# Patient Record
Sex: FEMALE | Race: WHITE | NOT HISPANIC OR LATINO | Employment: OTHER | URBAN - METROPOLITAN AREA
[De-identification: names, ages, dates, MRNs, and addresses within clinical notes are randomized per-mention and may not be internally consistent; named-entity substitution may affect disease eponyms.]

---

## 2024-05-17 ENCOUNTER — OFFICE VISIT (OUTPATIENT)
Dept: OBSTETRICS AND GYNECOLOGY | Facility: CLINIC | Age: 44
End: 2024-05-17
Payer: COMMERCIAL

## 2024-05-17 ENCOUNTER — PREP FOR PROCEDURE (OUTPATIENT)
Dept: OBSTETRICS AND GYNECOLOGY | Facility: CLINIC | Age: 44
End: 2024-05-17

## 2024-05-17 VITALS
BODY MASS INDEX: 45.27 KG/M2 | DIASTOLIC BLOOD PRESSURE: 85 MMHG | SYSTOLIC BLOOD PRESSURE: 104 MMHG | WEIGHT: 246 LBS | HEIGHT: 62 IN

## 2024-05-17 DIAGNOSIS — D69.3 IDIOPATHIC THROMBOCYTOPENIC PURPURA (MULTI): ICD-10-CM

## 2024-05-17 DIAGNOSIS — N92.0 MENORRHAGIA WITH REGULAR CYCLE: Primary | ICD-10-CM

## 2024-05-17 DIAGNOSIS — Z86.711 HISTORY OF PULMONARY EMBOLISM: ICD-10-CM

## 2024-05-17 DIAGNOSIS — N80.9 ENDOMETRIOSIS: ICD-10-CM

## 2024-05-17 DIAGNOSIS — M06.09 RHEUMATOID ARTHRITIS OF MULTIPLE SITES WITH NEGATIVE RHEUMATOID FACTOR (MULTI): ICD-10-CM

## 2024-05-17 DIAGNOSIS — N93.9 ABNORMAL UTERINE BLEEDING: Primary | ICD-10-CM

## 2024-05-17 DIAGNOSIS — Z01.818 PREOPERATIVE EXAM FOR GYNECOLOGIC SURGERY: ICD-10-CM

## 2024-05-17 DIAGNOSIS — E24.0 CUSHING DISEASE (MULTI): ICD-10-CM

## 2024-05-17 DIAGNOSIS — I27.20 PULMONARY HYPERTENSION (MULTI): ICD-10-CM

## 2024-05-17 PROCEDURE — 1036F TOBACCO NON-USER: CPT | Performed by: OBSTETRICS & GYNECOLOGY

## 2024-05-17 PROCEDURE — 99204 OFFICE O/P NEW MOD 45 MIN: CPT | Performed by: OBSTETRICS & GYNECOLOGY

## 2024-05-17 PROCEDURE — 99214 OFFICE O/P EST MOD 30 MIN: CPT | Performed by: OBSTETRICS & GYNECOLOGY

## 2024-05-17 RX ORDER — SULFASALAZINE 500 MG/1
1000 TABLET ORAL 3 TIMES DAILY
COMMUNITY
Start: 2024-02-07 | End: 2024-07-07

## 2024-05-17 ASSESSMENT — ENCOUNTER SYMPTOMS
GASTROINTESTINAL NEGATIVE: 0
ENDOCRINE NEGATIVE: 0
SHORTNESS OF BREATH: 1
HEMATOLOGIC/LYMPHATIC NEGATIVE: 0
RESPIRATORY NEGATIVE: 0
CONSTITUTIONAL NEGATIVE: 0
PSYCHIATRIC NEGATIVE: 0
CARDIOVASCULAR NEGATIVE: 0
MUSCULOSKELETAL NEGATIVE: 0
NEUROLOGICAL NEGATIVE: 0
BACK PAIN: 1
ARTHRALGIAS: 1
FATIGUE: 1
EYES NEGATIVE: 0
ALLERGIC/IMMUNOLOGIC NEGATIVE: 0

## 2024-05-17 ASSESSMENT — PATIENT HEALTH QUESTIONNAIRE - PHQ9
10. IF YOU CHECKED OFF ANY PROBLEMS, HOW DIFFICULT HAVE THESE PROBLEMS MADE IT FOR YOU TO DO YOUR WORK, TAKE CARE OF THINGS AT HOME, OR GET ALONG WITH OTHER PEOPLE: SOMEWHAT DIFFICULT
2. FEELING DOWN, DEPRESSED OR HOPELESS: SEVERAL DAYS
SUM OF ALL RESPONSES TO PHQ9 QUESTIONS 1 AND 2: 2
1. LITTLE INTEREST OR PLEASURE IN DOING THINGS: SEVERAL DAYS

## 2024-05-17 ASSESSMENT — PAIN SCALES - GENERAL: PAINLEVEL: 0-NO PAIN

## 2024-05-17 NOTE — PROGRESS NOTES
Subjective   Patient ID: Emilee Dempsey is a 43 y.o. female who presents for Menorrhagia (Last pap 11/12/18 wnl. HPV -.).  Patient here for evaluation.  Chart reviewed from [facility].  Internal medicine, cardiology, rheumatology, pulmonology notes all reviewed along with recent admissions patient with prior history which is very extensive.  Pertinent GYN history would be positive endometriosis.  Patient currently notes that she has heavy menses which initially were over 28 to 30 days lasting 6 to 8 days.  Patient notes that since March 28 has been bleeding off and on continuously occasionally heavy occasionally spotting.  Having moderate dysmenorrhea 4-8 out of 10 sharp crampy suprapubic variable not necessarily related to the amount of bleeding.  Seems variable no other gross inciting factors.  No radiation.    Review of Systems   Constitutional:  Positive for fatigue.   Respiratory:  Positive for shortness of breath.    Cardiovascular:  Positive for chest pain.   Genitourinary:  Positive for vaginal bleeding.   Musculoskeletal:  Positive for arthralgias and back pain.   All other systems reviewed and are negative.      Objective   Physical Exam  Constitutional:       Appearance: Normal appearance. She is obese.   Cardiovascular:      Rate and Rhythm: Normal rate and regular rhythm.   Pulmonary:      Effort: Pulmonary effort is normal.      Breath sounds: Normal breath sounds.   Abdominal:      General: Abdomen is flat. Bowel sounds are normal. There is no distension.      Palpations: Abdomen is soft. There is no mass.      Tenderness: There is no abdominal tenderness. There is no guarding.      Hernia: There is no hernia in the right inguinal area.   Genitourinary:     General: Normal vulva.      Pubic Area: No rash.       Labia:         Right: No rash, tenderness, lesion or injury.       Urethra: No prolapse, urethral pain, urethral swelling or urethral lesion.      Vagina: Normal.      Cervix: Normal.       Uterus: Normal.       Adnexa: Right adnexa normal and left adnexa normal.      Rectum: Normal.      Comments: External genitalia unremarkable  Vagina clear no blood noted.  Narrow vagina  Cervix closed uterus normal.  Difficult exam due to the patient's size  No adnexal tenderness or masses noted  External genitalia unremarkable    Musculoskeletal:      Cervical back: Normal range of motion and neck supple.   Lymphadenopathy:      Lower Body: No right inguinal adenopathy.   Neurological:      General: No focal deficit present.      Mental Status: She is alert.   Psychiatric:         Mood and Affect: Mood normal.         Behavior: Behavior normal.         Thought Content: Thought content normal.         Judgment: Judgment normal.     Assessment/Plan   Diagnoses and all orders for this visit:  Menorrhagia with regular cycle  -     US PELVIS TRANSABDOMINAL WITH TRANSVAGINAL; Future  Idiopathic thrombocytopenic purpura (Multi)  Cushing disease (Multi)  Rheumatoid arthritis of multiple sites with negative rheumatoid factor (Multi)  Pulmonary hypertension (Multi)  Endometriosis  Extensive review of chart 20 minutes prior to visit.  Another 20 minutes while patient was here.  Discussed situation with patient and mother.  Patient with multiple medical issues.  Has history of irregular bleeding since March 28 with a history of menorrhagia prior to that.  Discussed options for treatment.  Patient very uncomfortable during speculum exam, patient does not believe she can tolerate an endometrial biopsy in the office.  Discussed doing diagnostic hysteroscopy D&C in the hospital with placement of Mirena IUD to control the bleeding.  Risks and benefits reviewed.  Ultrasound ordered for evaluation of pelvic structures as exam in office is an adequate.  6-minute total exam with 20 minutes of exam 20 minutes of consultation and 20 minutes reviewing of chart         Magen Flower MD 05/17/24 2:26 PM

## 2024-05-22 ENCOUNTER — DOCUMENTATION (OUTPATIENT)
Dept: PREADMISSION TESTING | Facility: HOSPITAL | Age: 44
End: 2024-05-22
Payer: COMMERCIAL

## 2024-05-22 ENCOUNTER — TELEPHONE (OUTPATIENT)
Dept: OBSTETRICS AND GYNECOLOGY | Facility: CLINIC | Age: 44
End: 2024-05-22
Payer: COMMERCIAL

## 2024-05-22 PROBLEM — N93.9 ABNORMAL UTERINE BLEEDING: Status: ACTIVE | Noted: 2024-05-17

## 2024-05-24 ENCOUNTER — HOSPITAL ENCOUNTER (OUTPATIENT)
Dept: RADIOLOGY | Facility: HOSPITAL | Age: 44
Discharge: HOME | End: 2024-05-24
Payer: COMMERCIAL

## 2024-05-24 DIAGNOSIS — N92.0 MENORRHAGIA WITH REGULAR CYCLE: ICD-10-CM

## 2024-05-24 PROCEDURE — 76856 US EXAM PELVIC COMPLETE: CPT

## 2024-06-06 ENCOUNTER — TELEPHONE (OUTPATIENT)
Dept: OBSTETRICS AND GYNECOLOGY | Facility: CLINIC | Age: 44
End: 2024-06-06
Payer: COMMERCIAL

## 2024-06-06 NOTE — TELEPHONE ENCOUNTER
Called [facility] Cardiologist to ask if she could be seen earlier than 8/7/2 . We had her surgery scheduled for 6/26/24. Ava at  office told me no sooner appointments are available but they will put her on a wait list .  We will probably have to reschedule her procedure . Waiting on  return from [facility] to discuss this case.

## 2024-06-07 ENCOUNTER — CLINICAL SUPPORT (OUTPATIENT)
Dept: PREADMISSION TESTING | Facility: HOSPITAL | Age: 44
End: 2024-06-07
Payer: COMMERCIAL

## 2024-06-07 RX ORDER — OXYCODONE HYDROCHLORIDE 5 MG/1
5 CAPSULE ORAL EVERY 6 HOURS PRN
COMMUNITY

## 2024-06-07 RX ORDER — KETOTIFEN FUMARATE 0.35 MG/ML
1 SOLUTION/ DROPS OPHTHALMIC 2 TIMES DAILY
COMMUNITY

## 2024-06-14 ENCOUNTER — PRE-ADMISSION TESTING (OUTPATIENT)
Dept: PREADMISSION TESTING | Facility: HOSPITAL | Age: 44
End: 2024-06-14
Payer: COMMERCIAL

## 2024-06-14 ENCOUNTER — DOCUMENTATION (OUTPATIENT)
Dept: PREADMISSION TESTING | Facility: HOSPITAL | Age: 44
End: 2024-06-14
Payer: COMMERCIAL

## 2024-06-14 VITALS
RESPIRATION RATE: 20 BRPM | HEART RATE: 75 BPM | TEMPERATURE: 99.3 F | WEIGHT: 241.4 LBS | SYSTOLIC BLOOD PRESSURE: 128 MMHG | DIASTOLIC BLOOD PRESSURE: 85 MMHG | OXYGEN SATURATION: 97 % | BODY MASS INDEX: 44.42 KG/M2 | HEIGHT: 62 IN

## 2024-06-14 DIAGNOSIS — Z01.818 PRE-OP TESTING: Primary | ICD-10-CM

## 2024-06-14 LAB
ATRIAL RATE: 66 BPM
P AXIS: 64 DEGREES
P OFFSET: 197 MS
P ONSET: 135 MS
PR INTERVAL: 172 MS
Q ONSET: 221 MS
QRS COUNT: 11 BEATS
QRS DURATION: 94 MS
QT INTERVAL: 428 MS
QTC CALCULATION(BAZETT): 448 MS
QTC FREDERICIA: 442 MS
R AXIS: 92 DEGREES
T AXIS: 69 DEGREES
T OFFSET: 435 MS
VENTRICULAR RATE: 66 BPM

## 2024-06-14 PROCEDURE — 93010 ELECTROCARDIOGRAM REPORT: CPT | Performed by: INTERNAL MEDICINE

## 2024-06-14 PROCEDURE — 93005 ELECTROCARDIOGRAM TRACING: CPT | Performed by: NURSE PRACTITIONER

## 2024-06-14 RX ORDER — METOLAZONE 5 MG/1
5 TABLET ORAL 3 TIMES WEEKLY
COMMUNITY

## 2024-06-14 ASSESSMENT — ENCOUNTER SYMPTOMS
ARTHRALGIAS: 1
NEUROLOGICAL NEGATIVE: 1
NECK STIFFNESS: 1
MYALGIAS: 1
CONSTITUTIONAL NEGATIVE: 1
CONSTIPATION: 0
BRUISES/BLEEDS EASILY: 1
DIARRHEA: 1
PALPITATIONS: 0
ABDOMINAL PAIN: 0

## 2024-06-14 NOTE — PREPROCEDURE INSTRUCTIONS
Medication List            Accurate as of June 14, 2024  2:12 PM. Always use your most recent med list.                acetaminophen 500 mg tablet  Commonly known as: Tylenol  Notes to patient: May take morning of surgery if needed.     albuterol 90 mcg/actuation inhaler  Notes to patient: May use the morning of surgery.     aspirin 81 mg chewable tablet  Medication Adjustments for Surgery: Take morning of surgery with sip of water, no other fluids     biotin 2,500 mcg capsule  Medication Adjustments for Surgery: Stop 7 days before surgery     bumetanide 2 mg tablet  Commonly known as: Bumex  Medication Adjustments for Surgery: Take morning of surgery with sip of water, no other fluids     * buPROPion  mg 24 hr tablet  Commonly known as: Wellbutrin XL  Medication Adjustments for Surgery: Take morning of surgery with sip of water, no other fluids     * buPROPion  mg 24 hr tablet  Commonly known as: Wellbutrin XL  Medication Adjustments for Surgery: Take morning of surgery with sip of water, no other fluids     calcium carbonate 200 mg calcium chewable tablet  Commonly known as: Tums  Medication Adjustments for Surgery: Continue until night before surgery     carboxymethylcellulose 1 % ophthalmic solution dropperette  Commonly known as: Refresh Celluvisc  Notes to patient: May use day of surgery.     coenzyme Q-10 200 mg capsule  Medication Adjustments for Surgery: Stop 7 days before surgery     cyproheptadine 4 mg tablet  Commonly known as: Periactin  Medication Adjustments for Surgery: Stop 1 day before surgery     desvenlafaxine 100 mg 24 hr tablet  Commonly known as: Pristiq  Medication Adjustments for Surgery: Take morning of surgery with sip of water, no other fluids     diclofenac sodium 1 % gel  Commonly known as: Voltaren  Medication Adjustments for Surgery: Continue until night before surgery     diphenhydrAMINE 50 mg capsule  Commonly known as: BENADryl  Medication Adjustments for Surgery:  Continue until night before surgery     eptinezumab injection  Commonly known as: Vyepti  Notes to patient: Hold on day of surgery.     ergocalciferol 1.25 MG (90473 UT) capsule  Commonly known as: Vitamin D-2  Medication Adjustments for Surgery: Stop 1 day before surgery     fluocinonide 0.05 % external solution  Commonly known as: Lidex  Medication Adjustments for Surgery: Continue until night before surgery     fluticasone 50 mcg/actuation nasal spray  Commonly known as: Flonase  Notes to patient: May use on day of surgery.      folic acid 1 mg tablet  Commonly known as: Folvite  Medication Adjustments for Surgery: Stop 1 day before surgery     gabapentin 400 mg capsule  Commonly known as: Neurontin  Medication Adjustments for Surgery: Take morning of surgery with sip of water, no other fluids     hydroxychloroquine 200 mg tablet  Commonly known as: Plaquenil  Medication Adjustments for Surgery: Take morning of surgery with sip of water, no other fluids     icosapent ethyL 1 gram capsule  Commonly known as: Vascepa  Medication Adjustments for Surgery: Continue until night before surgery     ketoconazole 2 % shampoo  Commonly known as: NIZOral  Medication Adjustments for Surgery: Continue until night before surgery     ketotifen 0.025 % (0.035 %) ophthalmic solution  Commonly known as: Zaditor  Notes to patient: May use on day of surgery.      levalbuterol 1.25 mg/3 mL nebulizer solution  Commonly known as: Xopenex  Notes to patient: May use on day of surgery.     lidocaine 5 % ointment  Commonly known as: Xylocaine  Medication Adjustments for Surgery: Continue until night before surgery     loperamide 2 mg capsule  Commonly known as: Imodium  Medication Adjustments for Surgery: Stop 1 day before surgery     loratadine 10 mg tablet  Commonly known as: Claritin  Medication Adjustments for Surgery: Take morning of surgery with sip of water, no other fluids     LORazepam 2 mg tablet  Commonly known as: Ativan  Notes  to patient: May take on day of surgery if needed.     magnesium oxide 400 mg (241.3 mg magnesium) tablet  Commonly known as: Mag-Ox  Medication Adjustments for Surgery: Stop 1 day before surgery     metOLazone 5 mg tablet  Commonly known as: Zaroxolyn  Medication Adjustments for Surgery: Take morning of surgery with sip of water, no other fluids     milnacipran 100 mg tablet  Commonly known as: SavElla  Medication Adjustments for Surgery: Take morning of surgery with sip of water, no other fluids     moisturizing mouth solution  Commonly known as: Biotene Oral Dry Mouth  Medication Adjustments for Surgery: Continue until night before surgery     mometasone-formoterol 200-5 mcg/actuation inhaler  Commonly known as: Dulera 200  Notes to patient: May use the morning of surgery.     montelukast 10 mg tablet  Commonly known as: Singulair  Medication Adjustments for Surgery: Take morning of surgery with sip of water, no other fluids     multivitamin with minerals tablet  Medication Adjustments for Surgery: Stop 7 days before surgery     NEURIVA PLUS BRAIN PERFORMANCE ORAL  Medication Adjustments for Surgery: Stop 7 days before surgery     ondansetron 8 mg tablet  Commonly known as: Zofran  Notes to patient: May take the morning of surgery if needed.      Opsumit 10 mg tablet tablet  Generic drug: macitentan  Medication Adjustments for Surgery: Take morning of surgery with sip of water, no other fluids     Orenitram 0.125 mg ER tablet  Generic drug: treprostinil diolamine  Medication Adjustments for Surgery: Take morning of surgery with sip of water, no other fluids     oxyCODONE 5 mg immediate release capsule  Commonly known as: Oxy-IR  Notes to patient: May take on the day of surgery if needed.     pantoprazole 20 mg EC tablet  Commonly known as: ProtoNix  Medication Adjustments for Surgery: Take morning of surgery with sip of water, no other fluids     potassium chloride 20 mEq packet  Commonly known as:  Klor-Con  Medication Adjustments for Surgery: Continue until night before surgery     predniSONE 5 mg tablet  Commonly known as: Deltasone  Medication Adjustments for Surgery: Take morning of surgery with sip of water, no other fluids     Refresh Optive Advanced 0.5-1-0.5 % drops  Generic drug: [id number]  Notes to patient: May use the morning of surgery.     Rexulti 3 mg tablet  Generic drug: brexpiprazole  Medication Adjustments for Surgery: Take morning of surgery with sip of water, no other fluids     rOPINIRole 1 mg tablet  Commonly known as: Requip  Medication Adjustments for Surgery: Take morning of surgery with sip of water, no other fluids     Saphnelo  Generic drug: anifrolumab-fnia  Notes to patient: Hold day of surgery     sildenafil 20 mg tablet  Commonly known as: Revatio  Medication Adjustments for Surgery: Take morning of surgery with sip of water, no other fluids     sotatercept-csrk 45 mg kit  Medication Adjustments for Surgery: Take morning of surgery with sip of water, no other fluids     spironolactone 100 mg tablet  Commonly known as: Aldactone  Medication Adjustments for Surgery: Take morning of surgery with sip of water, no other fluids     sulfaSALAzine 500 mg tablet  Commonly known as: Azulfidine  Medication Adjustments for Surgery: Take morning of surgery with sip of water, no other fluids     tiZANidine 4 mg tablet  Commonly known as: Zanaflex  Medication Adjustments for Surgery: Take morning of surgery with sip of water, no other fluids     Ubrelvy 100 mg tablet tablet  Generic drug: ubrogepant  Notes to patient: May take morning of surgery if needed     VITAMIN B COMPLEX ORAL  Medication Adjustments for Surgery: Stop 7 days before surgery           * This list has 2 medication(s) that are the same as other medications prescribed for you. Read the directions carefully, and ask your doctor or other care provider to review them with you.                           **Concerning above medication instructions, if medication is normally taken at night, continue normal schedule.**  **DO NOT TAKE NIGHT PRIOR AND MORNING OF SURGERY**    CONTACT SURGEON'S OFFICE IF YOU DEVELOP:  * Fever = 100.4 F   * New respiratory symptoms (e.g. cough, shortness of breath, respiratory distress, sore throat)  * Recent loss of taste or smell  *Flu like symptoms such as headache, fatigue or gastrointestinal symptoms  * You develop any open sores, shingles, burning or painful urination   AND/OR:  * You no longer wish to have the surgery.  * Any other personal circumstances change that may lead to the need to cancel or defer this surgery.  *You were admitted to any hospital within one week of your planned procedure.    SMOKING:  *Quitting smoking can make a huge difference to your health and recovery from surgery.    *If you need help with quitting, call 0-398-QUIT-NOW.    THE DAY BEFORE SURGERY:   Nothing by mouth (no solids or liquids) after midnight.   If diabetic, please check fasting blood sugar upon waking up.    If fasting sugar is <80 mg/dl, please drink 100ml/3oz of apple juice no later than 2 hours prior to arrival time.      SURGICAL TIME  *You will be contacted between 2 p.m. and 6 p.m. the business day before your surgery with your arrival time.  *If you haven't received a call by 6pm, call [phone].  *Scheduled surgery times may change and you will be notified if this occurs-check your personal voicemail for any updates.    ON THE MORNING OF SURGERY:  *Wear comfortable, loose fitting clothing.   *Do not use moisturizers, creams, lotions or perfume.  *All jewelry and valuables should be left at home.  *Prosthetic devices such as contact lenses, hearing aids, dentures, eyelash extensions, hairpins and body piercing must be removed before surgery.    BRING WITH YOU:  *Photo ID and insurance card  *Current list of medicines and allergies  *Pacemaker/Defibrillator/Heart stent cards  *CPAP  machine and mask  *Slings/splints/crutches  *Copy of your complete Advanced Directive/DHPOA-if applicable  *Neurostimulator implant remote    PARKING AND ARRIVAL:  *Check in at the Main Entrance desk and let them know you are here for surgery.  *You will be directed to the 2nd floor surgical waiting area.    AFTER OUTPATIENT SURGERY:  *A responsible adult MUST accompany you at the time of discharge and stay with you for 24 hours after your surgery.  *You may NOT drive yourself home after surgery.  *You may use a taxi or ride sharing service ([employer]) to return home ONLY if you are accompanied by a friend or family member.  *Instructions for resuming your medications will be provided by your surgeon.

## 2024-06-14 NOTE — CPM/PAT NURSE NOTE
CPM/PAT Nurse Note      Name: Emilee Dempsey (Emilee Dempsey)  /Age: 1980/43 y.o.       Past Medical History:   Diagnosis Date    Abnormal uterine bleeding (AUB)     Acute on chronic systolic CHF (congestive heart failure) (Multi)     last saw cardiology  - appt needed, pt working on making appt.  Currently on cancellation list - scheduled for  (after surgery) pt aware needs cardiology appt prior to procedure    Acute pericarditis ([facility]-HCC)     admitted 3/2024, resolved per pulm note.    Adverse effect of anesthesia     ashtma attack when waking up    Alopecia areata     Anxiety     Asthma ([facility]-HCC)     Chronic headache     Chronic ITP (idiopathic thrombocytopenic purpura) (Multi)     s/p splenectomy.  4.30.24 - plt 407    Chronic respiratory failure (Multi)     on 2-3L baseline    Chronic sinusitis     Cushing's disease (Multi)     Depression     Endometriosis     Eosinophilia     Fibromyalgia     Gastritis     Hypertension     Hypokalemia     4.30.24: K 3.4 - oral supplement    Lumbar spondylosis     Lupus nephritis (Multi)     GFR 79, BUN 26, creat 0.93    PAH (pulmonary artery hypertension) (Multi)     follows with Dr. Aponte at [facility] - sotatercept, Winrevair NYHA class 3    Personal history of pulmonary embolism      - while on oral birth control    Raynaud disease     RLS (restless legs syndrome)     SLE (systemic lupus erythematosus) (Multi)     Sleep apnea     CPAP with O2 bleed in    TIA (transient ischemic attack)     15 years ago per pt, residual memory issues       Past Surgical History:   Procedure Laterality Date    CERVICAL CONIZATION   W/ LASER      x 3    DILATION AND CURETTAGE OF UTERUS      HYSTEROSCOPY      NASAL SEPTUM SURGERY      OTHER SURGICAL HISTORY      Laparoscopy/hysteroscopy    OTHER SURGICAL HISTORY  2018    Surgically induced     OTHER SURGICAL HISTORY      RFA - back    SPLENECTOMY, TOTAL  2018    Splenectomy       Patient  reports that she  "is not currently sexually active and has had partner(s) who are male. She reports using the following method of birth control/protection: None.    Family History   Problem Relation Name Age of Onset    Heart failure Father      Stroke Father         Allergies   Allergen Reactions    Ciprofloxacin (Bulk) Anaphylaxis    Levaquin [Levofloxacin] Anaphylaxis    Adhesive Tape-Silicones Rash     EKG PADS CAUSE WELTS/RASH. \"RED DOT OR PEDIATRIC LEADS ARE OK\"    Rash also from adhesive remover     Atorvastatin Myalgia    Chlorhexidine Itching and Rash     Red wash     Pt states she develops severe rash with chlorhexadine baths.    Nsaids (Non-Steroidal Anti-Inflammatory Drug) Other     2/2 lupus nephritis     Cefadroxil Cough       Prior to Admission medications    Medication Sig Start Date End Date Taking? Authorizing Provider   acetaminophen (Tylenol) 500 mg tablet Take 2 tablets (1,000 mg) by mouth every 8 hours if needed. 1/2/23   Historical Provider, MD   albuterol 90 mcg/actuation inhaler Inhale 2 puffs every 4 hours if needed. 7/22/18   Historical Provider, MD   amoxicillin-pot clavulanate (Augmentin) 875-125 mg tablet Take 1 tablet (875 mg) by mouth twice a day. 5/28/24 6/7/24  Historical Provider, MD   anifrolumab-fnia (Saphnelo) Infuse 2 mL (300 mg) into a venous catheter every 28 (twenty-eight) days.    Historical Provider, MD   aspirin 81 mg chewable tablet Chew.    Historical Provider, MD   B6/folic/B12/coffee/phosphatid (NEURIVA PLUS BRAIN PERFORMANCE ORAL) Take by mouth once daily.    Historical Provider, MD   biotin 2,500 mcg capsule Take 1 capsule (2.5 mg) by mouth once daily.    Historical Provider, MD   bumetanide (Bumex) 2 mg tablet Take 5 tablets (10 mg) by mouth once daily. 7/23/18   Historical Provider, MD   buPROPion XL (Wellbutrin XL) 150 mg 24 hr tablet Take 1 Tablet by mouth every morning With one 300 mg tablet    Historical Provider, MD   buPROPion XL (Wellbutrin XL) 300 mg 24 hr tablet Take 1 " tablet (300 mg) by mouth once daily.    Historical Provider, MD   calcium carbonate (Tums) 200 mg calcium chewable tablet Chew 2 tablets (1,000 mg) every 8 hours if needed. 5/10/23   Historical Provider, MD   carboxymethylcellulose (Refresh Celluvisc) 1 % ophthalmic solution dropperette Administer into affected eye(s) 4 times a day.    Historical Provider, MD   coenzyme Q-10 200 mg capsule Take by mouth.    Historical Provider, MD   cyproheptadine (Periactin) 4 mg tablet Take 1 tablet (4 mg) by mouth if needed. 7/9/18   Historical Provider, MD   desvenlafaxine 100 mg 24 hr tablet Take 1 tablet (100 mg) by mouth once daily.    Historical Provider, MD   diclofenac sodium (Voltaren) 1 % gel Apply 4.5 inches (4 g) topically 4 times a day. 11/21/23 6/23/24  Historical Provider, MD   diphenhydrAMINE (BENADryl) 50 mg capsule Take 1 capsule (50 mg) by mouth every 8 hours if needed. 11/22/23 12/2/24  Historical Provider, MD   eptinezumab (Vyepti) injection Infuse 3 mL (300 mg total) into a venous catheter every 3 months.    Historical Provider, MD   ergocalciferol (Vitamin D-2) 1.25 MG (01339 UT) capsule Take 1 capsule (50,000 Units) by mouth 2 times a week. [facility] 6/2/18 7/17/24  Historical Provider, MD   fluocinonide (Lidex) 0.05 % external solution Apply twice daily Monday through Friday . Take weekends off. 7/24/18   Historical Provider, MD   fluticasone (Flonase) 50 mcg/actuation nasal spray 2 sprays by Does not apply route twice a day. 4/9/18   Historical Provider, MD   folic acid (Folvite) 1 mg tablet Take 1 tablet (1 mg) by mouth once daily. 3/25/18 3/1/25  Historical Provider, MD   gabapentin (Neurontin) 400 mg capsule Take 3 capsules (1,200 mg) by mouth. 7/15/18 11/21/24  Historical Provider, MD   hydroxychloroquine (Plaquenil) 200 mg tablet Take 1 tablet (200 mg) by mouth twice a day. 10/23/17 3/1/25  Historical Provider, MD   icosapent ethyL (Vascepa) 1 gram capsule Take 1 capsule (1 g) by mouth once daily.  12/8/23   Historical Provider, MD   ketoconazole (NIZOral) 2 % shampoo  7/24/18   Historical Provider, MD   ketotifen (Zaditor) 0.025 % (0.035 %) ophthalmic solution Administer 1 drop into both eyes 2 times a day.    Historical Provider, MD   levalbuterol (Xopenex) 1.25 mg/3 mL nebulizer solution Inhale 3 mL 3 times a day as needed.    Historical Provider, MD   lidocaine (Xylocaine) 5 % ointment if needed. 7/15/23   Historical Provider, MD   loperamide (Imodium) 2 mg capsule Take 2 capsules (4 mg) by mouth. 5/21/24 8/19/24  Historical Provider, MD   loratadine (Claritin) 10 mg tablet Take 1 tablet (10 mg) by mouth once daily. 5/10/23 8/11/24  Historical Provider, MD   LORazepam (Ativan) 2 mg tablet Take 1 tablet (2 mg) by mouth 3 times a day as needed for anxiety.    Historical Provider, MD   magnesium oxide (Mag-Ox) 400 mg (241.3 mg magnesium) tablet Take by mouth 2 times a day.    Historical Provider, MD   metOLazone (Zaroxolyn) 5 mg tablet Take 1 tablet (5 mg) by mouth 3 times a week. [id number]    Historical Provider, MD   milnacipran (SavElla) 100 mg tablet Take 1 tablet (100 mg) by mouth twice a day. 6/7/18 7/9/24  Historical Provider, MD   moisturizing mouth (Biotene Oral Dry Mouth) solution Take 2 sprays by mouth every 4 hours if needed. 9/23/22   Historical Provider, MD   mometasone-formoterol (Dulera 200) 200-5 mcg/actuation inhaler Inhale every 12 hours.    Historical Provider, MD   montelukast (Singulair) 10 mg tablet Take 1 tablet (10 mg) by mouth once daily. 7/22/18 1/24/25  Historical Provider, MD   multivitamin with minerals (multivit-min-iron fum-folic ac) tablet Take by mouth.    Historical Provider, MD   ondansetron (Zofran) 8 mg tablet Take 1 tablet (8 mg) by mouth every 8 hours if needed. 4/4/23   Historical Provider, MD   Opsumit 10 mg tablet tablet Take 1 tablet (10 mg) by mouth once daily. 7/17/23   Historical Provider, MD   Orenitram 0.125 mg ER tablet Take 4 tablets (0.5 mg) by mouth 2 times a  day. 9/15/23   Historical Provider, MD   oxyCODONE (Oxy-IR) 5 mg immediate release capsule Take 1 capsule (5 mg) by mouth every 6 hours if needed for severe pain (7 - 10).    Historical Provider, MD   pantoprazole (ProtoNix) 20 mg EC tablet Take by mouth every 12 hours.    Historical Provider, MD   potassium chloride (Klor-Con) 20 mEq packet Take 20 mEq by mouth 4 times a day. 8/21/23   Historical Provider, MD   predniSONE (Deltasone) 5 mg tablet Take by mouth once daily:  6 tablets - 5 days, 5 tablets - 5 days, 4 tablets - 5 days, 3 tablets - 5 days, 2 tablets - 5 days, 1 tablet - 5 days, then STOP. 7/22/18   Historical Provider, MD   Refresh Optive Advanced 0.5-1-0.5 % drops Administer 1 drop into affected eye(s) 4 times a day. 2/14/23   Historical Provider, MD   Rexulti 3 mg tablet Take 1 tablet by mouth once daily.    Historical Provider, MD   rOPINIRole (Requip) 1 mg tablet Take 1 tablet (1 mg) by mouth 3 times a day. 7/26/18   Historical Provider, MD   sildenafil (Revatio) 20 mg tablet TAKE 4 TABLETS THREE TIMES A DAY 6/25/18   Historical Provider, MD   sotatercept-csrk 45 mg kit Inject 0.7 mL under the skin every 21 (twenty-one) days. 6/5/24   Historical Provider, MD   spironolactone (Aldactone) 100 mg tablet Take 4 tablets (400 mg) by mouth once daily. 10/23/23 10/22/24  Historical Provider, MD   sulfaSALAzine (Azulfidine) 500 mg tablet Take 2 tablets (1,000 mg) by mouth 3 times a day. 2/7/24 7/7/24  Historical Provider, MD   tiZANidine (Zanaflex) 4 mg tablet Take 1 tablet (4 mg) by mouth every 8 hours if needed. 7/5/18 7/10/24  Historical Provider, MD   Ubrelvy 100 mg tablet tablet Take 1 tablet by mouth at onset of migraine/headache. May repeat dose in 2 hours if needed. Do NOT take more than 2 tablets in 24 hours. Max dose is 200 mg in 24 hours. No more than 16 tabs per month. 3/24/23   Historical Provider, MD   VITAMIN B COMPLEX ORAL Take 1 tablet by mouth once daily. 10/30/23 11/21/24  Historical  Provider, MD JESSICA HEARD     DASI Risk Score    No data to display       Caprini DVT Assessment    No data to display       Modified Frailty Index    No data to display       CHADS2 Stroke Risk  Current as of 8 minutes ago        N/A 3 to 100%: High Risk   2 to < 3%: Medium Risk   0 to < 2%: Low Risk     Last Change: N/A          This score determines the patient's risk of having a stroke if the patient has atrial fibrillation.        This score is not applicable to this patient. Components are not calculated.          Revised Cardiac Risk Index    No data to display       Apfel Simplified Score    No data to display       Risk Analysis Index Results This Encounter    No data found in the last 1 encounters.         Nurse Plan of Action:     After Visit Summary (AVS) reviewed and patient verbalized good understanding of medications and NPO instructions.

## 2024-06-14 NOTE — PREPROCEDURE INSTRUCTIONS
Medication List            Accurate as of June 14, 2024  1:36 PM. Always use your most recent med list.                acetaminophen 500 mg tablet  Commonly known as: Tylenol  Notes to patient: May take morning of surgery if needed.     albuterol 90 mcg/actuation inhaler  Notes to patient: May use the morning of surgery.     aspirin 81 mg chewable tablet  Medication Adjustments for Surgery: Take morning of surgery with sip of water, no other fluids     biotin 2,500 mcg capsule  Medication Adjustments for Surgery: Stop 7 days before surgery     bumetanide 2 mg tablet  Commonly known as: Bumex  Medication Adjustments for Surgery: Take morning of surgery with sip of water, no other fluids     * buPROPion  mg 24 hr tablet  Commonly known as: Wellbutrin XL  Medication Adjustments for Surgery: Take morning of surgery with sip of water, no other fluids     * buPROPion  mg 24 hr tablet  Commonly known as: Wellbutrin XL  Medication Adjustments for Surgery: Take morning of surgery with sip of water, no other fluids     calcium carbonate 200 mg calcium chewable tablet  Commonly known as: Tums  Medication Adjustments for Surgery: Continue until night before surgery     carboxymethylcellulose 1 % ophthalmic solution dropperette  Commonly known as: Refresh Celluvisc  Notes to patient: May use day of surgery.     coenzyme Q-10 200 mg capsule  Medication Adjustments for Surgery: Stop 7 days before surgery     cyproheptadine 4 mg tablet  Commonly known as: Periactin  Medication Adjustments for Surgery: Stop 1 day before surgery     desvenlafaxine 100 mg 24 hr tablet  Commonly known as: Pristiq  Medication Adjustments for Surgery: Take morning of surgery with sip of water, no other fluids     diclofenac sodium 1 % gel  Commonly known as: Voltaren  Medication Adjustments for Surgery: Continue until night before surgery     diphenhydrAMINE 50 mg capsule  Commonly known as: BENADryl  Medication Adjustments for Surgery:  Continue until night before surgery     eptinezumab injection  Commonly known as: Vyepti  Notes to patient: Hold on day of surgery.     ergocalciferol 1.25 MG (33868 UT) capsule  Commonly known as: Vitamin D-2  Medication Adjustments for Surgery: Stop 1 day before surgery     fluocinonide 0.05 % external solution  Commonly known as: Lidex  Medication Adjustments for Surgery: Continue until night before surgery     fluticasone 50 mcg/actuation nasal spray  Commonly known as: Flonase  Notes to patient: May use on day of surgery.      folic acid 1 mg tablet  Commonly known as: Folvite  Medication Adjustments for Surgery: Stop 1 day before surgery     gabapentin 400 mg capsule  Commonly known as: Neurontin  Medication Adjustments for Surgery: Take morning of surgery with sip of water, no other fluids     hydroxychloroquine 200 mg tablet  Commonly known as: Plaquenil  Medication Adjustments for Surgery: Take morning of surgery with sip of water, no other fluids     icosapent ethyL 1 gram capsule  Commonly known as: Vascepa  Medication Adjustments for Surgery: Continue until night before surgery     ketoconazole 2 % shampoo  Commonly known as: NIZOral  Medication Adjustments for Surgery: Continue until night before surgery     ketotifen 0.025 % (0.035 %) ophthalmic solution  Commonly known as: Zaditor  Notes to patient: May use on day of surgery.      levalbuterol 1.25 mg/3 mL nebulizer solution  Commonly known as: Xopenex  Notes to patient: May use on day of surgery.     lidocaine 5 % ointment  Commonly known as: Xylocaine  Medication Adjustments for Surgery: Continue until night before surgery     loperamide 2 mg capsule  Commonly known as: Imodium  Medication Adjustments for Surgery: Stop 1 day before surgery     loratadine 10 mg tablet  Commonly known as: Claritin  Medication Adjustments for Surgery: Take morning of surgery with sip of water, no other fluids     LORazepam 2 mg tablet  Commonly known as: Ativan  Notes  to patient: May take on day of surgery if needed.     magnesium oxide 400 mg (241.3 mg magnesium) tablet  Commonly known as: Mag-Ox  Medication Adjustments for Surgery: Stop 1 day before surgery     metOLazone 5 mg tablet  Commonly known as: Zaroxolyn  Medication Adjustments for Surgery: Take morning of surgery with sip of water, no other fluids     milnacipran 100 mg tablet  Commonly known as: SavElla  Medication Adjustments for Surgery: Take morning of surgery with sip of water, no other fluids     moisturizing mouth solution  Commonly known as: Biotene Oral Dry Mouth  Medication Adjustments for Surgery: Continue until night before surgery     mometasone-formoterol 200-5 mcg/actuation inhaler  Commonly known as: Dulera 200  Notes to patient: May use the morning of surgery.     montelukast 10 mg tablet  Commonly known as: Singulair  Medication Adjustments for Surgery: Take morning of surgery with sip of water, no other fluids     multivitamin with minerals tablet  Medication Adjustments for Surgery: Stop 7 days before surgery     NEURIVA PLUS BRAIN PERFORMANCE ORAL  Medication Adjustments for Surgery: Stop 7 days before surgery     ondansetron 8 mg tablet  Commonly known as: Zofran  Notes to patient: May take the morning of surgery if needed.      Opsumit 10 mg tablet tablet  Generic drug: macitentan  Medication Adjustments for Surgery: Take morning of surgery with sip of water, no other fluids     Orenitram 0.125 mg ER tablet  Generic drug: treprostinil diolamine  Medication Adjustments for Surgery: Take morning of surgery with sip of water, no other fluids     oxyCODONE 5 mg immediate release capsule  Commonly known as: Oxy-IR  Notes to patient: May take on the day of surgery if needed.     pantoprazole 20 mg EC tablet  Commonly known as: ProtoNix  Medication Adjustments for Surgery: Take morning of surgery with sip of water, no other fluids     potassium chloride 20 mEq packet  Commonly known as:  Klor-Con  Medication Adjustments for Surgery: Continue until night before surgery     predniSONE 5 mg tablet  Commonly known as: Deltasone  Medication Adjustments for Surgery: Take morning of surgery with sip of water, no other fluids     Refresh Optive Advanced 0.5-1-0.5 % drops  Generic drug: [id number]  Notes to patient: May use the morning of surgery.     Rexulti 3 mg tablet  Generic drug: brexpiprazole  Medication Adjustments for Surgery: Take morning of surgery with sip of water, no other fluids     rOPINIRole 1 mg tablet  Commonly known as: Requip  Medication Adjustments for Surgery: Take morning of surgery with sip of water, no other fluids     Saphnelo  Generic drug: anifrolumab-fnia  Notes to patient: Hold day of surgery     sildenafil 20 mg tablet  Commonly known as: Revatio  Medication Adjustments for Surgery: Take morning of surgery with sip of water, no other fluids     sotatercept-csrk 45 mg kit  Medication Adjustments for Surgery: Take morning of surgery with sip of water, no other fluids     spironolactone 100 mg tablet  Commonly known as: Aldactone  Medication Adjustments for Surgery: Take morning of surgery with sip of water, no other fluids     sulfaSALAzine 500 mg tablet  Commonly known as: Azulfidine  Medication Adjustments for Surgery: Take morning of surgery with sip of water, no other fluids     tiZANidine 4 mg tablet  Commonly known as: Zanaflex  Medication Adjustments for Surgery: Take morning of surgery with sip of water, no other fluids     Ubrelvy 100 mg tablet tablet  Generic drug: ubrogepant  Notes to patient: May take morning of surgery if needed     VITAMIN B COMPLEX ORAL  Medication Adjustments for Surgery: Stop 7 days before surgery           * This list has 2 medication(s) that are the same as other medications prescribed for you. Read the directions carefully, and ask your doctor or other care provider to review them with you.                                 **Concerning above medication instructions, if medication is normally taken at night, continue normal schedule.**  **DO NOT TAKE NIGHT PRIOR AND MORNING OF SURGERY**    CONTACT SURGEON'S OFFICE IF YOU DEVELOP:  * Fever = 100.4 F   * New respiratory symptoms (e.g. cough, shortness of breath, respiratory distress, sore throat)  * Recent loss of taste or smell  *Flu like symptoms such as headache, fatigue or gastrointestinal symptoms  * You develop any open sores, shingles, burning or painful urination   AND/OR:  * You no longer wish to have the surgery.  * Any other personal circumstances change that may lead to the need to cancel or defer this surgery.  *You were admitted to any hospital within one week of your planned procedure.    SMOKING:  *Quitting smoking can make a huge difference to your health and recovery from surgery.    *If you need help with quitting, call 6-003-QUIT-NOW.    THE DAY BEFORE SURGERY:  *Do not eat any food after midnight the night before surgery.   *You are permitted to drink clear liquids (i.e. water, black coffee (no milk or cream), tea, apple juice and electrolyte drinks (gatorade)) up to 13.5 ounces, up to 2 hours before your arrival time.  *You may chew gum until 2 hours before your surgery    SURGICAL TIME  *You will be contacted between 2 p.m. and 6 p.m. the business day before your surgery with your arrival time.  *If you haven't received a call by 6pm, call [phone].  *Scheduled surgery times may change and you will be notified if this occurs-check your personal voicemail for any updates.    ON THE MORNING OF SURGERY:  *Wear comfortable, loose fitting clothing.   *Do not use moisturizers, creams, lotions or perfume.  *All jewelry and valuables should be left at home.  *Prosthetic devices such as contact lenses, hearing aids, dentures, eyelash extensions, hairpins and body piercing must be removed before surgery.    BRING WITH YOU:  *Photo ID and insurance card  *Current  list of medicines and allergies  *Pacemaker/Defibrillator/Heart stent cards  *CPAP machine and mask  *Slings/splints/crutches  *Copy of your complete Advanced Directive/DHPOA-if applicable  *Neurostimulator implant remote    PARKING AND ARRIVAL:  *Check in at the Main Entrance desk and let them know you are here for surgery.  *You will be directed to the 2nd floor surgical waiting area.    AFTER OUTPATIENT SURGERY:  *A responsible adult MUST accompany you at the time of discharge and stay with you for 24 hours after your surgery.  *You may NOT drive yourself home after surgery.  *You may use a taxi or ride sharing service ([employer], [employer]) to return home ONLY if you are accompanied by a friend or family member.  *Instructions for resuming your medications will be provided by your surgeon.

## 2024-06-14 NOTE — CPM/PAT H&P
[facility] Evaluation       Name: Emilee Dempsey (Emilee Dempsey)  /Age: 1980/43 y.o.         Date of Consult: 24     Referring Provider: Dr. Flower    Surgery, Date, and Length: D & C Diagnostic Hysteroscopy  Insertion Mirena Intra Uterus Device, 24, 75 min.    Emilee Dempsey is a 43 year-old female who presents to the [facility] for perioperative risk assessment prior to surgery.    Patient presents with a primary diagnosis of menorrhagia with regular cycle.   Patient has a complex medical history including, SLE and pulmonary hypertension, she also wears 2-3L of O2 around the clock. She follows with specialists including Internal medicine, cardiology, rheumatology, pulmonology.  Pertinent GYN history would be positive endometriosis. Patient currently notes that she has heavy menses which initially were over 28 to 30 days lasting 6 to 8 days. Patient notes that since  has been bleeding off and on continuously occasionally heavy occasionally spotting. Having moderate dysmenorrhea 4-8 out of 10 sharp crampy suprapubic variable not necessarily related to the amount of bleeding. Seems variable no other gross inciting factors. No radiation.     This note was created in part upon personal review of patient's medical records.      Patient is scheduled to have a D & C Diagnostic Hysteroscopy  Insertion Mirena Intra Uterus Device      Pt denies any past history of anesthetic complications such as PONV, awareness, prolonged sedation, dental damage, aspiration, cardiac arrest, difficult intubation, difficult I.V. access or unexpected hospital admissions.  NO malignant hyperthermia and or pseudocholinesterase deficiency.  No history of blood transfusions     Patient states she had an ashtma attack one time when waking up from surgery.     The patient is not a Zoroastrianism and will accept blood and blood products if medically indicated.       Past Medical History:   Diagnosis Date     Abnormal uterine bleeding (AUB)     Acute on chronic systolic CHF (congestive heart failure) ([facility])     last saw cardiology  - appt needed, pt working on making appt.  Currently on cancellation list - scheduled for  (after surgery) pt aware needs cardiology appt prior to procedure    Acute pericarditis ([facility])     admitted 3/2024, resolved per pulm note.    Adverse effect of anesthesia     ashtma attack when waking up    Alopecia areata     Anxiety     Asthma ([facility])     Chronic headache     Chronic ITP (idiopathic thrombocytopenic purpura) ([facility])     s/p splenectomy.  4.30.24 - plt 407    Chronic respiratory failure ([facility])     on 2-3L baseline    Chronic sinusitis     Cushing's disease ([facility])     Depression     Endometriosis     Eosinophilia     Fibromyalgia     Gastritis     Hypertension     Hypokalemia     4.30.24: K 3.4 - oral supplement    Lumbar spondylosis     Lupus nephritis ([facility])     GFR 79, BUN 26, creat 0.93    PAH (pulmonary artery hypertension) ([facility])     follows with Dr. Aponte at [facility] - sotatercept, Winrevair NYHA class 3    Personal history of pulmonary embolism      - while on oral birth control    Raynaud disease     RLS (restless legs syndrome)     SLE (systemic lupus erythematosus) ([facility])     Sleep apnea     CPAP with O2 bleed in    TIA (transient ischemic attack)     15 years ago per pt, residual memory issues       Past Surgical History:   Procedure Laterality Date    CERVICAL CONIZATION   W/ LASER      x 3    DILATION AND CURETTAGE OF UTERUS      HYSTEROSCOPY      NASAL SEPTUM SURGERY      OTHER SURGICAL HISTORY      Laparoscopy/hysteroscopy    OTHER SURGICAL HISTORY  2018    Surgically induced     OTHER SURGICAL HISTORY      RFA - back    SPLENECTOMY, TOTAL  2018    Splenectomy       Family History   Problem Relation Name Age of Onset    Heart failure Father      Stroke Father         Social History     Socioeconomic History    Marital status:  Single     Spouse name: Not on file    Number of children: Not on file    Years of education: Not on file    Highest education level: Not on file   Occupational History    Not on file   Tobacco Use    Smoking status: Never    Smokeless tobacco: Never   Vaping Use    Vaping status: Never Used   Substance and Sexual Activity    Alcohol use: Not Currently     Comment: rarely    Drug use: Never    Sexual activity: Not Currently     Partners: Male     Birth control/protection: None   Other Topics Concern    Not on file   Social History Narrative    Not on file     Social Determinants of Health     Financial Resource Strain: Medium Risk (9/12/2022)    Received from [facility]    Overall Financial Resource Strain (CARDIA)     Difficulty of Paying Living Expenses: Somewhat hard   Food Insecurity: Food Insecurity Present (2/24/2024)    Received from [facility]    Hunger Vital Sign     Worried About Running Out of Food in the Last Year: Sometimes true     Ran Out of Food in the Last Year: Sometimes true   Transportation Needs: No Transportation Needs (2/24/2024)    Received from [facility]    PRAPARE - Transportation     Lack of Transportation (Medical): No     Lack of Transportation (Non-Medical): No   Physical Activity: Not on File (5/21/2021)    Received from [facility]     Physical Activity     Physical Activity: 0   Stress: Not on File (5/21/2021)    Received from [facility]     Stress     Stress: 0   Social Connections: Not on File (5/21/2021)    Received from [facility]     Social Connections     Social Connections and Isolation: 0   Intimate Partner Violence: Not on file   Housing Stability: Unknown (9/12/2022)    Received from [facility]    Housing Stability Vital Sign     Unable to Pay for Housing in the Last Year: No     Number of Places Lived in the Last Year: Not on file     Unstable Housing in the Last Year: No          Allergies   Allergen Reactions    Ciprofloxacin (Bulk) Anaphylaxis    Levaquin  "[Levofloxacin] Anaphylaxis    Adhesive Tape-Silicones Rash     EKG PADS CAUSE WELTS/RASH. \"RED DOT OR PEDIATRIC LEADS ARE OK\"    Rash also from adhesive remover     Atorvastatin Myalgia    Chlorhexidine Itching and Rash     Red wash     Pt states she develops severe rash with chlorhexadine baths.    Nsaids (Non-Steroidal Anti-Inflammatory Drug) Other     2/2 lupus nephritis     Cefadroxil Cough         Current Outpatient Medications:     acetaminophen (Tylenol) 500 mg tablet, Take 2 tablets (1,000 mg) by mouth every 8 hours if needed., Disp: , Rfl:     albuterol 90 mcg/actuation inhaler, Inhale 2 puffs every 4 hours if needed., Disp: , Rfl:     anifrolumab-fnia (Saphnelo), Infuse 2 mL (300 mg) into a venous catheter every 28 (twenty-eight) days., Disp: , Rfl:     aspirin 81 mg chewable tablet, Chew., Disp: , Rfl:     B6/folic/B12/coffee/phosphatid (NEURIVA PLUS BRAIN PERFORMANCE ORAL), Take by mouth once daily., Disp: , Rfl:     biotin 2,500 mcg capsule, Take 1 capsule (2.5 mg) by mouth once daily., Disp: , Rfl:     bumetanide (Bumex) 2 mg tablet, Take 5 tablets (10 mg) by mouth once daily., Disp: , Rfl:     buPROPion XL (Wellbutrin XL) 150 mg 24 hr tablet, Take 1 Tablet by mouth every morning With one 300 mg tablet, Disp: , Rfl:     buPROPion XL (Wellbutrin XL) 300 mg 24 hr tablet, Take 1 tablet (300 mg) by mouth once daily., Disp: , Rfl:     calcium carbonate (Tums) 200 mg calcium chewable tablet, Chew 2 tablets (1,000 mg) every 8 hours if needed., Disp: , Rfl:     carboxymethylcellulose (Refresh Celluvisc) 1 % ophthalmic solution dropperette, Administer into affected eye(s) 4 times a day., Disp: , Rfl:     coenzyme Q-10 200 mg capsule, Take by mouth., Disp: , Rfl:     cyproheptadine (Periactin) 4 mg tablet, Take 1 tablet (4 mg) by mouth if needed., Disp: , Rfl:     desvenlafaxine 100 mg 24 hr tablet, Take 1 tablet (100 mg) by mouth once daily., Disp: , Rfl:     diclofenac sodium (Voltaren) 1 % gel, Apply 4.5 " inches (4 g) topically 4 times a day., Disp: , Rfl:     diphenhydrAMINE (BENADryl) 50 mg capsule, Take 1 capsule (50 mg) by mouth every 8 hours if needed., Disp: , Rfl:     ergocalciferol (Vitamin D-2) 1.25 MG (42172 UT) capsule, Take 1 capsule (50,000 Units) by mouth 2 times a week. Th-Sa, Disp: , Rfl:     fluocinonide (Lidex) 0.05 % external solution, Apply twice daily Monday through Friday . Take weekends off., Disp: , Rfl:     fluticasone (Flonase) 50 mcg/actuation nasal spray, 2 sprays by Does not apply route twice a day., Disp: , Rfl:     folic acid (Folvite) 1 mg tablet, Take 1 tablet (1 mg) by mouth once daily., Disp: , Rfl:     gabapentin (Neurontin) 400 mg capsule, Take 3 capsules (1,200 mg) by mouth., Disp: , Rfl:     hydroxychloroquine (Plaquenil) 200 mg tablet, Take 1 tablet (200 mg) by mouth twice a day., Disp: , Rfl:     icosapent ethyL (Vascepa) 1 gram capsule, Take 1 capsule (1 g) by mouth once daily., Disp: , Rfl:     ketoconazole (NIZOral) 2 % shampoo, , Disp: , Rfl:     ketotifen (Zaditor) 0.025 % (0.035 %) ophthalmic solution, Administer 1 drop into both eyes 2 times a day., Disp: , Rfl:     loperamide (Imodium) 2 mg capsule, Take 2 capsules (4 mg) by mouth., Disp: , Rfl:     loratadine (Claritin) 10 mg tablet, Take 1 tablet (10 mg) by mouth once daily., Disp: , Rfl:     LORazepam (Ativan) 2 mg tablet, Take 1 tablet (2 mg) by mouth 3 times a day as needed for anxiety., Disp: , Rfl:     magnesium oxide (Mag-Ox) 400 mg (241.3 mg magnesium) tablet, Take by mouth 2 times a day., Disp: , Rfl:     metOLazone (Zaroxolyn) 5 mg tablet, Take 1 tablet (5 mg) by mouth 3 times a week. M-w-f, Disp: , Rfl:     milnacipran (SavElla) 100 mg tablet, Take 1 tablet (100 mg) by mouth twice a day., Disp: , Rfl:     moisturizing mouth (Biotene Oral Dry Mouth) solution, Take 2 sprays by mouth every 4 hours if needed., Disp: , Rfl:     mometasone-formoterol (Dulera 200) 200-5 mcg/actuation inhaler, Inhale every 12  hours., Disp: , Rfl:     montelukast (Singulair) 10 mg tablet, Take 1 tablet (10 mg) by mouth once daily., Disp: , Rfl:     multivitamin with minerals (multivit-min-iron fum-folic ac) tablet, Take by mouth., Disp: , Rfl:     ondansetron (Zofran) 8 mg tablet, Take 1 tablet (8 mg) by mouth every 8 hours if needed., Disp: , Rfl:     Opsumit 10 mg tablet tablet, Take 1 tablet (10 mg) by mouth once daily., Disp: , Rfl:     Orenitram 0.125 mg ER tablet, Take 4 tablets (0.5 mg) by mouth 2 times a day., Disp: , Rfl:     oxyCODONE (Oxy-IR) 5 mg immediate release capsule, Take 1 capsule (5 mg) by mouth every 6 hours if needed for severe pain (7 - 10)., Disp: , Rfl:     pantoprazole (ProtoNix) 20 mg EC tablet, Take by mouth every 12 hours., Disp: , Rfl:     potassium chloride (Klor-Con) 20 mEq packet, Take 20 mEq by mouth 4 times a day., Disp: , Rfl:     predniSONE (Deltasone) 5 mg tablet, Take by mouth once daily:  6 tablets - 5 days, 5 tablets - 5 days, 4 tablets - 5 days, 3 tablets - 5 days, 2 tablets - 5 days, 1 tablet - 5 days, then STOP., Disp: , Rfl:     Refresh Optive Advanced 0.5-1-0.5 % drops, Administer 1 drop into affected eye(s) 4 times a day., Disp: , Rfl:     Rexulti 3 mg tablet, Take 1 tablet by mouth once daily., Disp: , Rfl:     rOPINIRole (Requip) 1 mg tablet, Take 1 tablet (1 mg) by mouth 3 times a day., Disp: , Rfl:     sildenafil (Revatio) 20 mg tablet, TAKE 4 TABLETS THREE TIMES A DAY, Disp: , Rfl:     sotatercept-csrk 45 mg kit, Inject 0.7 mL under the skin every 21 (twenty-one) days., Disp: , Rfl:     spironolactone (Aldactone) 100 mg tablet, Take 4 tablets (400 mg) by mouth once daily., Disp: , Rfl:     sulfaSALAzine (Azulfidine) 500 mg tablet, Take 2 tablets (1,000 mg) by mouth 3 times a day., Disp: , Rfl:     tiZANidine (Zanaflex) 4 mg tablet, Take 1 tablet (4 mg) by mouth every 8 hours if needed., Disp: , Rfl:     Ubrelvy 100 mg tablet tablet, Take 1 tablet by mouth at onset of migraine/headache.  May repeat dose in 2 hours if needed. Do NOT take more than 2 tablets in 24 hours. Max dose is 200 mg in 24 hours. No more than 16 tabs per month., Disp: , Rfl:     VITAMIN B COMPLEX ORAL, Take 1 tablet by mouth once daily., Disp: , Rfl:     eptinezumab (Vyepti) injection, Infuse 3 mL (300 mg total) into a venous catheter every 3 months., Disp: , Rfl:     levalbuterol (Xopenex) 1.25 mg/3 mL nebulizer solution, Inhale 3 mL 3 times a day as needed., Disp: , Rfl:     lidocaine (Xylocaine) 5 % ointment, if needed., Disp: , Rfl:       PAT ROS:   Constitutional:   neg    Neuro/Psych:   neg    Eyes:    use of corrective lenses  Ears:    no hearing aides  Nose:   Mouth:    Couple of missing teeth  Throat:   neg    Neck:    neck stiffness  Cardio:    Patient in wheelchair.   Patient wears O2 2-3 L all day  CPAP wear at night with O2    no chest pain   no palpitations  Respiratory:   Endocrine:   GI:    no abdominal pain   no constipation   diarrhea  :    Lupus nephritis  Musculoskeletal:    arthralgias (generalized)   myalgias (generalized)  Hematologic:    bruises/bleeds easily   blood clots (PE-2008 or 2009)  Skin:   rash (lupus butterfly rash on face)      Physical Exam  Constitutional:       Appearance: She is obese.   HENT:      Head: Normocephalic and atraumatic.      Mouth/Throat:      Mouth: Mucous membranes are moist.      Pharynx: Oropharynx is clear.   Eyes:      Comments: Conjunctivae slightly reddened   Cardiovascular:      Rate and Rhythm: Normal rate and regular rhythm.      Pulses: Normal pulses.      Heart sounds: Normal heart sounds.   Pulmonary:      Effort: Pulmonary effort is normal.      Breath sounds: Normal breath sounds.   Abdominal:      General: Bowel sounds are normal. There is no distension.      Palpations: Abdomen is soft.      Tenderness: There is no abdominal tenderness. There is no guarding.   Musculoskeletal:         General: No swelling.      Comments: Unable to ambulate for long  "distances, wheelchair bound  Bilateral lower legs, red claudio rash noted. No edema   Skin:     Capillary Refill: Capillary refill takes less than 2 seconds.      Findings: Erythema and rash present.      Comments: Butterfly red facial rash   Neurological:      Mental Status: She is alert and oriented to person, place, and time.          PAT AIRWAY:   Airway:     Mallampati::  III    Neck ROM::  Limited    Visit Vitals  /85   Pulse 75   Temp 37.4 °C (99.3 °F)   Resp 20   Ht 1.575 m (5' 2\")   Wt 110 kg (241 lb 6.5 oz)   LMP 03/30/2024   SpO2 97% Comment: 2L O2   BMI 44.15 kg/m²   OB Status Having periods   Smoking Status Never   BSA 2.19 m²      Plan    Neuro:  Migraines-  Eptinezumab-hold dos  Ubrelvy-may take the morning of surgery if needed    Cardiovascular:  Patient denies any chest pain, tightness, heaviness, pressure, radiating pain, palpitations, irregular heartbeats, lightheadedness, cough, congestion, shortness of breath, ZHU, PND, near syncope, weight loss or gain.    Aspirin 81 mg- patient to take dos    Pulmonary hypertension:  Opsumit-patient to take dos  Orenitram-patient to take dos  Sildenafil-patient to take dos  Sotatercept-csrk-patient to take dos  Bumex-patient to take dos  Metolazone-patient to take dos  Aldactone-patient to take dos    HLD: Vascepa-patient to continue until night before surgery    Hypokalemia:   Klor-con- patient continue until night before surgery    Acute pericarditis  admitted 3/2024, resolved per pulm note.     EKG in PAT   Encounter Date: 06/14/24   ECG 12 Lead   Result Value    Ventricular Rate 66    Atrial Rate 66    DC Interval 172    QRS Duration 94    QT Interval 428    QTC Calculation(Bazett) 448    P Axis 64    R Axis 92    T Axis 69    QRS Count 11    Q Onset 221    P Onset 135    P Offset 197    T Offset 435    QTC Fredericia 442    Narrative    Normal sinus rhythm  Right atrial enlargement  Rightward axis  Borderline ECG  When compared with ECG of 08-OCT-2022 " 17:03,  PREVIOUS ECG IS PRESENT  Confirmed by Og Velasco ([id number]) on 6/14/2024 5:19:08 PM         RCRI: 1 Risk of Mace: 6.0%      Pulm:  Chronic respiratory failure-on 2-3L NC around the clock    Known and treated  VALDEZ- Patient was instructed to bring CPAP machine the morning of surgery. Recommend prioritizing  nonopioid analgesic techniques (regional and local anesthesia, nonsteroidal medications, etc) before the administration of opioids and close monitoring for hypoventilation after surgery due to VALDEZ. Increased vigilance is recommended with the use of narcotics due to an increased risk for opioid induced respiratory depression     Blended O2 with BiPap machine     Asthma  Xopenex-patient may use dos  Dulera-patient to use dos      Renal/endo:  Cushing's Disease  Lupus nephritis    GI/:  Zofran-patient may take dos if needed  Protonix-patient to take dos  Rheum:   SLE-  Plaquenil-patient to take dos  Saphnelo infusion-hold dos  Sulfasalazine-patient to take dos    Heme:    Chronic idiopathic thrombocytopenic purpura    Patient instructed to ambulate as soon as possible postoperatively to decrease thromboembolic risk.    Initiate mechanical DVT prophylaxis as soon as possible and initiate chemical prophylaxis when deemed safe from a bleeding standpoint post surgery.    Caprini= 13    Risk assessment complete.  Patient is scheduled for an intermediate surgical risk procedure.        Labs/testing ordered, patient has a port and will be having labs drawn on 06/21/24. Orders given to patient to take to lab.   COMPLETE BLOOD COUNT  Order: [id number]  Component  Ref Range & Units 3 d ago   WBC  3.70 - 11.00 k/uL 12.83 High    RBC  3.90 - 5.20 m/uL 5.16   Hemoglobin  11.5 - 15.5 g/dL 14.7   Hematocrit  36.0 - 46.0 % 44.8   MCV  80.0 - 100.0 fL 86.8   MCH  26.0 - 34.0 pg 28.5   MCHC  30.5 - 36.0 g/dL 32.8   RDW-CV  11.5 - 15.0 % 18.6 High    Platelet Count  150 - 400 k/uL 415 High    MPV  9.0 - 12.7 fL 10.5   Absolute  nRBC  <0.01 k/uL <0.01   Resulting Agency [facility]     Specimen Collected: 06/21/24 10:41    Performed by: [facility] Last Resulted: 06/21/24 10:46   Received From: [facility]  Result Received: 06/24/24 07:49    View Encounter        Received Information  BASIC METABOLIC PANEL  Order: [id number]  Component  Ref Range & Units 3 d ago   Glucose  74 - 99 mg/dL 74   Comment: The American Diabetes Association (ADA) provides guidance for cutoff values for fasting glucose and random glucose. The ADA defines fasting as no caloric intake for at least 8 hours. Fasting plasma glucose results between 100 to 125 mg/dL indicate increased risk for diabetes (prediabetes).  Fasting plasma glucose results greater than or equal to 126 mg/dL meet the criteria for diagnosis of diabetes. In the absence of unequivocal hyperglycemia, results should be confirmed by repeat testing. In a patient with classic symptoms of hyperglycemia or hyperglycemic crisis, random plasma glucose results greater than or equal to 200 mg/dL meet the criteria for diagnosis of diabetes.  Reference: Standards of Medical Care in Diabetes 2016, American Diabetes Association. Diabetes Care. 2016.39(Suppl 1).   BUN  7 - 21 mg/dL 26 High    Creatinine  0.58 - 0.96 mg/dL 1.16 High    Sodium  136 - 144 mmol/L 137   Potassium  3.7 - 5.1 mmol/L 3.3 Low    Chloride  98 - 107 mmol/L 93 Low    CO2  22 - 30 mmol/L 28   Anion Gap  8 - 15 mmol/L 16 High    Calcium, Total  8.5 - 10.2 mg/dL 10.4 High    Estimated Glomerular Filtration Rate  >=60 mL/min/1.73m² 61   Comment: Estimated Glomerular Filtration Rate (eGFR) is calculated using the 2021 CKD-EPI creatinine equation. This equation utilizes serum creatinine, sex, and age as parameters. The creatinine assay has traceable calibration to isotope dilution-mass spectrometry. Refer to KDIGO guidelines for clinical interpretation. In patients with unstable renal function, e.g. those with acute kidney  injury, the eGFR may not accurately reflect actual GFR.   Resulting Agency [facility]     Specimen Collected: 06/21/24 10:41    Performed by: [facility] Last Resulted: 06/22/24 01:22   Received From: [facility]  Result Received: 06/24/24 07:49       Follow up: Labs      Communication:  Per the revised cardiac risk index she would be considered low risk for cardiac morbidity or mortality although likely has some increased risk because of her pulmonary hypertension.  Note her last echocardiogram in April 2024 revealed that her pulm hypertension was moderate with low normal RV systolic function and preserved LV systolic function.     Avoid significant hypotension during the procedure because of her pulmonary hypertension-consider placement of a right heart catheter to monitor the patient's status during surgery.  Postop course also be complicated by her sleep apnea-will need close postop monitoring       Preoperative medication instructions were provided and reviewed with the patient.  Any additional testing or evaluation was explained to the patient.  Nothing by mouth instructions were discussed and patient's questions were answered prior to conclusion to this encounter.  Patient verbalized understanding of preoperative instructions given in preadmission testing; discharge instructions available in EMR.    This note was dictated with speech recognition.  Minor errors may have been detected during use of speech recognition.

## 2024-06-18 DIAGNOSIS — Z01.818 PREOPERATIVE EXAM FOR GYNECOLOGIC SURGERY: Primary | ICD-10-CM

## 2024-06-19 ASSESSMENT — ENCOUNTER SYMPTOMS
NEUROLOGICAL NEGATIVE: 1
CONSTITUTIONAL NEGATIVE: 1
MYALGIAS: 1
ARTHRALGIAS: 1
BRUISES/BLEEDS EASILY: 1
NECK STIFFNESS: 1
PALPITATIONS: 0
CONSTIPATION: 0
DIARRHEA: 1
ABDOMINAL PAIN: 0

## 2024-06-20 PROBLEM — I49.1 PREMATURE ATRIAL COMPLEXES: Status: ACTIVE | Noted: 2020-04-13

## 2024-06-20 PROBLEM — R06.02 SHORTNESS OF BREATH: Status: ACTIVE | Noted: 2020-12-30

## 2024-06-20 PROBLEM — K52.9 CHRONIC DIARRHEA: Status: ACTIVE | Noted: 2023-07-02

## 2024-06-20 PROBLEM — I50.810 RIGHT HEART FAILURE DUE TO PULMONARY HYPERTENSION (MULTI): Status: ACTIVE | Noted: 2019-11-04

## 2024-06-20 PROBLEM — K21.9 GERD (GASTROESOPHAGEAL REFLUX DISEASE): Status: ACTIVE | Noted: 2021-04-15

## 2024-06-20 PROBLEM — Z90.49 S/P LAPAROSCOPIC CHOLECYSTECTOMY: Status: ACTIVE | Noted: 2021-04-15

## 2024-06-20 PROBLEM — E87.6 HYPOKALEMIA: Status: ACTIVE | Noted: 2019-08-13

## 2024-06-20 PROBLEM — J96.11 CHRONIC RESPIRATORY FAILURE WITH HYPOXIA (MULTI): Status: ACTIVE | Noted: 2018-12-28

## 2024-06-20 PROBLEM — G25.81 RESTLESS LEG SYNDROME: Status: ACTIVE | Noted: 2021-04-15

## 2024-06-20 PROBLEM — I27.21 PULMONARY ARTERIAL HYPERTENSION (MULTI): Status: ACTIVE | Noted: 2024-06-20

## 2024-06-20 PROBLEM — F41.1 GENERALIZED ANXIETY DISORDER: Status: ACTIVE | Noted: 2023-09-13

## 2024-06-20 PROBLEM — Z86.79 HISTORY OF PULMONARY HYPERTENSION: Status: RESOLVED | Noted: 2024-02-23 | Resolved: 2024-06-20

## 2024-06-20 PROBLEM — I49.3 PVC (PREMATURE VENTRICULAR CONTRACTION): Status: ACTIVE | Noted: 2020-04-13

## 2024-06-20 PROBLEM — I07.1 TRICUSPID REGURGITATION: Status: ACTIVE | Noted: 2019-12-13

## 2024-06-20 PROBLEM — L93.0 LUPUS ERYTHEMATOSUS: Status: ACTIVE | Noted: 2024-06-20

## 2024-06-20 PROBLEM — Z86.79 HISTORY OF PULMONARY HYPERTENSION: Status: ACTIVE | Noted: 2024-02-23

## 2024-06-20 PROBLEM — F33.41 RECURRENT MAJOR DEPRESSIVE DISORDER, IN PARTIAL REMISSION (CMS-HCC): Chronic | Status: ACTIVE | Noted: 2021-06-15

## 2024-06-20 PROBLEM — I50.813 ACUTE ON CHRONIC RIGHT-SIDED HEART FAILURE (MULTI): Status: ACTIVE | Noted: 2022-03-26

## 2024-06-20 PROBLEM — N92.6 IRREGULAR MENSES: Status: ACTIVE | Noted: 2024-06-20

## 2024-06-20 PROBLEM — I27.29 RIGHT HEART FAILURE DUE TO PULMONARY HYPERTENSION (MULTI): Status: ACTIVE | Noted: 2019-11-04

## 2024-06-20 PROBLEM — E66.01 MORBID OBESITY (MULTI): Status: ACTIVE | Noted: 2024-06-20

## 2024-06-20 PROBLEM — H04.123 DRY EYE SYNDROME OF BOTH EYES: Status: ACTIVE | Noted: 2021-01-15

## 2024-06-20 PROBLEM — R07.89 NON-CARDIAC CHEST PAIN: Status: ACTIVE | Noted: 2019-08-13

## 2024-06-21 ENCOUNTER — APPOINTMENT (OUTPATIENT)
Dept: CARDIOLOGY | Facility: CLINIC | Age: 44
End: 2024-06-21
Payer: COMMERCIAL

## 2024-06-21 VITALS
OXYGEN SATURATION: 96 % | BODY MASS INDEX: 44.99 KG/M2 | WEIGHT: 246 LBS | DIASTOLIC BLOOD PRESSURE: 50 MMHG | SYSTOLIC BLOOD PRESSURE: 90 MMHG | HEART RATE: 60 BPM

## 2024-06-21 DIAGNOSIS — Z01.818 PRE-OP EVALUATION: Primary | ICD-10-CM

## 2024-06-21 DIAGNOSIS — I50.813 ACUTE ON CHRONIC RIGHT-SIDED HEART FAILURE (MULTI): ICD-10-CM

## 2024-06-21 DIAGNOSIS — G47.33 OSA (OBSTRUCTIVE SLEEP APNEA): ICD-10-CM

## 2024-06-21 DIAGNOSIS — I27.20 PULMONARY HYPERTENSION (MULTI): ICD-10-CM

## 2024-06-21 PROCEDURE — 3074F SYST BP LT 130 MM HG: CPT | Performed by: INTERNAL MEDICINE

## 2024-06-21 PROCEDURE — 1036F TOBACCO NON-USER: CPT | Performed by: INTERNAL MEDICINE

## 2024-06-21 PROCEDURE — 99205 OFFICE O/P NEW HI 60 MIN: CPT | Performed by: INTERNAL MEDICINE

## 2024-06-21 PROCEDURE — 3078F DIAST BP <80 MM HG: CPT | Performed by: INTERNAL MEDICINE

## 2024-06-21 RX ORDER — TRAZODONE HYDROCHLORIDE 100 MG/1
TABLET ORAL
COMMUNITY

## 2024-06-21 RX ORDER — MULTIVITAMIN WITH FOLIC ACID 400 MCG
TABLET ORAL
COMMUNITY
Start: 2023-08-21

## 2024-06-21 NOTE — PROGRESS NOTES
Referred by Dr. Tenorio ref. provider found for New Patient Visit (Patient is here as a new patient)     History Of Present Illness:    Emilee Dempsey is a 43 y.o. female with PAH (followed at [facility])/CHF/Lupus /pericarditis/Pulm embolism presenting for cardiac clearance.  Scheduled for diagnostic hysteroscopy/D and C/placement of Mirena IUD.  Recently treated for bronchitis-short of breath improving  Has had some edema in hot weather conditions   No angina  Activity limited        Past Medical History:  She has a past medical history of Abnormal uterine bleeding (AUB), Acute on chronic systolic CHF (congestive heart failure) (Multi), Acute pericarditis ([facility]-HCC), Adverse effect of anesthesia, Alopecia areata, Anxiety, Asthma ([facility]-HCC), Chronic headache, Chronic ITP (idiopathic thrombocytopenic purpura) (Multi), Chronic respiratory failure (Multi), Chronic sinusitis, Cushing's disease (Multi), Depression, Endometriosis, Eosinophilia, Fibromyalgia, Gastritis, Hypertension, Hypokalemia, Lumbar spondylosis, Lupus nephritis (Multi), PAH (pulmonary artery hypertension) (Multi), Personal history of pulmonary embolism, Raynaud disease, RLS (restless legs syndrome), SLE (systemic lupus erythematosus) (Multi), Sleep apnea, and TIA (transient ischemic attack).    Past Surgical History:  She has a past surgical history that includes Other surgical history; Other surgical history (11/12/2018); Splenectomy, total (11/12/2018); Cervical conization w/ laser; Dilation and curettage of uterus; Hysteroscopy; Nasal septum surgery; and Other surgical history.      Social History:  She reports that she has never smoked. She has never used smokeless tobacco. She reports that she does not currently use alcohol. She reports that she does not use drugs.    Family History:  Family History   Problem Relation Name Age of Onset    Heart failure Father      Stroke Father          Allergies:  Ciprofloxacin (bulk), Levaquin [levofloxacin], Adhesive  tape-silicones, Atorvastatin, Chlorhexidine, Nsaids (non-steroidal anti-inflammatory drug), and Cefadroxil    Outpatient Medications:  Current Outpatient Medications   Medication Instructions    acetaminophen (TYLENOL) 1,000 mg, oral, Every 8 hours PRN    albuterol 90 mcg/actuation inhaler 2 puffs, inhalation, Every 4 hours PRN    aspirin 81 mg chewable tablet oral    B6/folic/B12/coffee/phosphatid (NEURIVA PLUS BRAIN PERFORMANCE ORAL) oral, Daily    biotin 2,500 mcg capsule 1 capsule, oral, Daily RT    bumetanide (Bumex) 2 mg tablet 5 tablets, oral, Daily    buPROPion XL (Wellbutrin XL) 150 mg 24 hr tablet Take 1 Tablet by mouth every morning With one 300 mg tablet    buPROPion XL (WELLBUTRIN XL) 300 mg, oral, Daily    calcium carbonate (TUMS) 1,000 mg, oral, Every 8 hours PRN    carboxymethylcellulose (Refresh Celluvisc) 1 % ophthalmic solution dropperette ophthalmic (eye), 4 times daily    coenzyme Q-10 200 mg capsule oral    cyproheptadine (PERIACTIN) 4 mg, oral, As needed    desvenlafaxine (PRISTIQ) 100 mg, oral, Daily    diclofenac sodium (VOLTAREN) 4 g, Topical, 4 times daily    diphenhydrAMINE (BENADRYL) 50 mg, oral, Every 8 hours PRN    eptinezumab (VYEPTI) 300 mg, intravenous, Every 3 months    ergocalciferol (VITAMIN D-2) 50,000 Units, oral, 2 times weekly, Th-Sa    fluocinonide (Lidex) 0.05 % external solution Apply twice daily Monday through Friday . Take weekends off.    fluticasone (Flonase) 50 mcg/actuation nasal spray 2 sprays, Does not apply, 2 times daily    folic acid (FOLVITE) 1 mg, oral, Daily RT    gabapentin (NEURONTIN) 1,200 mg, oral    hydroxychloroquine (PLAQUENIL) 200 mg, oral, 2 times daily    icosapent ethyL (VASCEPA) 1 g, oral, Daily RT    ketoconazole (NIZOral) 2 % shampoo     ketotifen (Zaditor) 0.025 % (0.035 %) ophthalmic solution 1 drop, Both Eyes, 2 times daily    levalbuterol (Xopenex) 1.25 mg/3 mL nebulizer solution 3 mL, inhalation, 3 times daily PRN    lidocaine (Xylocaine) 5  % ointment if needed.    loperamide (IMODIUM) 4 mg, oral    loratadine (CLARITIN) 10 mg, oral, Daily RT    LORazepam (ATIVAN) 2 mg, oral, 3 times daily PRN    magnesium oxide (Mag-Ox) 400 mg (241.3 mg magnesium) tablet oral, 2 times daily    metOLazone (ZAROXOLYN) 5 mg, oral, 3 times weekly, M-w-f    milnacipran (SAVELLA) 100 mg, oral, 2 times daily    moisturizing mouth (Biotene Oral Dry Mouth) solution 2 sprays, oral, Every 4 hours PRN    mometasone-formoterol (Dulera 200) 200-5 mcg/actuation inhaler inhalation, Every 12 hours    montelukast (SINGULAIR) 10 mg, oral, Daily RT    multivitamin with minerals (multivit-min-iron fum-folic ac) tablet oral    ondansetron (ZOFRAN) 8 mg, oral, Every 8 hours PRN    One Daily Essential tablet     Opsumit 10 mg, oral, Daily RT    oxyCODONE (OXY-IR) 5 mg, oral, Every 6 hours PRN    pantoprazole (ProtoNix) 20 mg EC tablet oral, Every 12 hours    potassium chloride (Klor-Con) 20 mEq packet 20 mEq, oral, 4 times daily    predniSONE (Deltasone) 5 mg tablet Take by mouth once daily:  6 tablets - 5 days, 5 tablets - 5 days, 4 tablets - 5 days, 3 tablets - 5 days, 2 tablets - 5 days, 1 tablet - 5 days, then STOP.    Refresh Optive Advanced 0.5-1-0.5 % drops 1 drop, ophthalmic (eye), 4 times daily    Rexulti 3 mg tablet 1 tablet, oral, Daily    rOPINIRole (REQUIP) 1 mg, oral, 3 times daily    Saphnelo 300 mg, intravenous, Every 28 days    sildenafil (Revatio) 20 mg tablet TAKE 4 TABLETS THREE TIMES A DAY    sotatercept-csrk 45 mg kit 0.7 mL, subcutaneous, Every 21 days    spironolactone (ALDACTONE) 400 mg, oral, Daily    sulfaSALAzine (AZULFIDINE) 1,000 mg, oral, 3 times daily    tiZANidine (ZANAFLEX) 4 mg, oral, Every 8 hours PRN    traZODone (Desyrel) 100 mg tablet Take 1-2 Tablets by mouth nightly at bedtime as needed    Ubrelvy 100 mg tablet tablet Take 1 tablet by mouth at onset of migraine/headache. May repeat dose in 2 hours if needed. Do NOT take more than 2 tablets in 24  "hours. Max dose is 200 mg in 24 hours. No more than 16 tabs per month.    VITAMIN B COMPLEX ORAL 1 tablet, oral, Daily RT        Last Recorded Vitals:  Vitals:    06/21/24 1158   BP: 153/80   BP Location: Left arm   Patient Position: Sitting   BP Cuff Size: Adult   Pulse: 60   SpO2: 96%   Weight: 112 kg (246 lb)       Physical Exam:  Constitutional:       General: Awake.      Appearance: Healthy appearance. Not in distress. Obese.   Neck:      Vascular: No JVR. JVD normal.   Pulmonary:      Effort: Pulmonary effort is normal.      Breath sounds: Normal breath sounds. No wheezing. No rhonchi. No rales.   Chest:      Chest wall: Not tender to palpatation.   Cardiovascular:      PMI at left midclavicular line. Normal rate. Regular rhythm. Normal S1. Normal S2.       Murmurs: There is no murmur.      No gallop.  No click. No rub.   Pulses:     Intact distal pulses.   Edema:     Peripheral edema absent.   Abdominal:      General: Abdomen is protuberant. Bowel sounds are normal.      Palpations: Abdomen is soft.      Tenderness: There is no abdominal tenderness.   Musculoskeletal: Normal range of motion.         General: No tenderness. Skin:     General: Skin is warm and dry.   Neurological:      General: No focal deficit present.      Mental Status: Alert and oriented to person, place and time.            Last Labs:  CBC -  Lab Results   Component Value Date    WBC 13.6 (H) 10/11/2022    HGB 11.8 (L) 10/11/2022    HCT 37.8 10/11/2022    MCV 89 10/11/2022     (H) 10/11/2022       CMP -  Lab Results   Component Value Date    CALCIUM 9.8 10/11/2022    PROT 7.0 10/11/2022    ALBUMIN 3.9 10/11/2022    AST 17 10/11/2022    ALT 32 10/11/2022    ALKPHOS 75 10/11/2022    BILITOT 0.3 10/11/2022       LIPID PANEL -   No results found for: \"CHOL\", \"TRIG\", \"HDL\", \"CHHDL\", \"LDLF\", \"VLDL\"    RENAL FUNCTION PANEL -   Lab Results   Component Value Date    GLUCOSE 109 (H) 10/11/2022     (L) 10/11/2022    K 4.1 10/11/2022    " CL 96 (L) 10/11/2022    CO2 31 10/11/2022    ANIONGAP 12 10/11/2022    BUN 21 10/11/2022    CREATININE 0.95 10/11/2022    CALCIUM 9.8 10/11/2022    ALBUMIN 3.9 10/11/2022        Lab Results   Component Value Date    BNP 22 10/08/2022    HGBA1C 5.5 09/28/2022       Last Cardiology Tests:  ECG:  ECG 12 Lead 06/14/2024    NSR  LINA  RAD  Echo: 4/2024  Mild rv dilitation  Rv systolic function nl  PapS=58 mmhg      Ejection Fractions:  66%    Cath:  No results found for this or any previous visit from the past 1095 days.      Stress Test:  No results found for this or any previous visit from the past 1095 days.      Cardiac Imaging:  No results found for this or any previous visit from the past 1095 days.        Lab review: I have personally reviewed the laboratory result(s)     Assessment/Plan   Problem List Items Addressed This Visit       Acute on chronic right-sided heart failure (Multi)    Overview     Patient with some mild lower extremity edema which is only been noted in the last couple days in the setting of extreme heat.  Respiratory status is stable  Suggested elevating the legs and watching salt intake  No increase in diuretic as blood pressure is on the low side         VALDEZ (obstructive sleep apnea)    Overview     Last Assessment & Plan:    Formatting of this note might be different from the original.   Continue nocturnal CPAP         Pre-op evaluation - Primary    Overview     Patient has been having vaginal bleeding  She is scheduled for diagnostic hysteroscopy/D&C/placement of a Mirena IUD.  Per the revised cardiac risk index she would be considered low risk for cardiac morbidity or mortality although likely has some increased risk because of her pulmonary hypertension.  Note her last echocardiogram in April 2024 revealed that her pulm hypertension was moderate with low normal RV systolic function and preserved LV systolic function.    Avoid significant hypotension during the procedure because of her  pulmonary hypertension-consider placement of a right heart catheter to monitor the patient's status during surgery.  Postop course also be complicated by her sleep apnea-will need close postop monitoring         Pulmonary hypertension (Multi)    Overview     Developed after a pulmonary embolism   Followed at [facility]  On spironolactone, Bumex, sildenafil,sotatercept  Most recent PAPs=58 per 4/2024 echo             Watch salt intake closely  Keep legs elevated      Bo Larsen, DO

## 2024-06-21 NOTE — PROGRESS NOTES
RN called Patient  Had a blood draw at [facility], to draw labs off her port,  Results should uploaded on Monday  Reviewed surgical pre procedure questions  Reviewed Patient's last ultrasound report  RN answered Patients questions  Martha Sidhu RN

## 2024-06-26 ENCOUNTER — ANESTHESIA EVENT (OUTPATIENT)
Dept: OPERATING ROOM | Facility: HOSPITAL | Age: 44
End: 2024-06-26
Payer: COMMERCIAL

## 2024-06-26 ENCOUNTER — HOSPITAL ENCOUNTER (OUTPATIENT)
Facility: HOSPITAL | Age: 44
Setting detail: OUTPATIENT SURGERY
Discharge: HOME | End: 2024-06-26
Attending: OBSTETRICS & GYNECOLOGY | Admitting: OBSTETRICS & GYNECOLOGY
Payer: COMMERCIAL

## 2024-06-26 ENCOUNTER — ANESTHESIA (OUTPATIENT)
Dept: OPERATING ROOM | Facility: HOSPITAL | Age: 44
End: 2024-06-26
Payer: COMMERCIAL

## 2024-06-26 VITALS
WEIGHT: 246.47 LBS | TEMPERATURE: 97.2 F | HEIGHT: 62 IN | HEART RATE: 65 BPM | RESPIRATION RATE: 16 BRPM | SYSTOLIC BLOOD PRESSURE: 119 MMHG | DIASTOLIC BLOOD PRESSURE: 67 MMHG | BODY MASS INDEX: 45.36 KG/M2 | OXYGEN SATURATION: 100 %

## 2024-06-26 DIAGNOSIS — N93.9 ABNORMAL UTERINE BLEEDING: ICD-10-CM

## 2024-06-26 LAB — PREGNANCY TEST URINE, POC: NEGATIVE

## 2024-06-26 PROCEDURE — 2500000004 HC RX 250 GENERAL PHARMACY W/ HCPCS (ALT 636 FOR OP/ED)

## 2024-06-26 PROCEDURE — 3600000002 HC OR TIME - INITIAL BASE CHARGE - PROCEDURE LEVEL TWO: Performed by: OBSTETRICS & GYNECOLOGY

## 2024-06-26 PROCEDURE — 58300 INSERT INTRAUTERINE DEVICE: CPT | Performed by: OBSTETRICS & GYNECOLOGY

## 2024-06-26 PROCEDURE — 3600000007 HC OR TIME - EACH INCREMENTAL 1 MINUTE - PROCEDURE LEVEL TWO: Performed by: OBSTETRICS & GYNECOLOGY

## 2024-06-26 PROCEDURE — 7100000010 HC PHASE TWO TIME - EACH INCREMENTAL 1 MINUTE: Performed by: OBSTETRICS & GYNECOLOGY

## 2024-06-26 PROCEDURE — 7100000009 HC PHASE TWO TIME - INITIAL BASE CHARGE: Performed by: OBSTETRICS & GYNECOLOGY

## 2024-06-26 PROCEDURE — 7100000001 HC RECOVERY ROOM TIME - INITIAL BASE CHARGE: Performed by: OBSTETRICS & GYNECOLOGY

## 2024-06-26 PROCEDURE — 58558 HYSTEROSCOPY BIOPSY: CPT | Performed by: OBSTETRICS & GYNECOLOGY

## 2024-06-26 PROCEDURE — 2500000005 HC RX 250 GENERAL PHARMACY W/O HCPCS: Performed by: ANESTHESIOLOGY

## 2024-06-26 PROCEDURE — 88305 TISSUE EXAM BY PATHOLOGIST: CPT | Mod: TC,AHULAB | Performed by: OBSTETRICS & GYNECOLOGY

## 2024-06-26 PROCEDURE — 2500000001 HC RX 250 WO HCPCS SELF ADMINISTERED DRUGS (ALT 637 FOR MEDICARE OP): Performed by: ANESTHESIOLOGY

## 2024-06-26 PROCEDURE — 88305 TISSUE EXAM BY PATHOLOGIST: CPT | Performed by: PATHOLOGY

## 2024-06-26 PROCEDURE — 2500000005 HC RX 250 GENERAL PHARMACY W/O HCPCS

## 2024-06-26 PROCEDURE — 2500000004 HC RX 250 GENERAL PHARMACY W/ HCPCS (ALT 636 FOR OP/ED): Performed by: ANESTHESIOLOGY

## 2024-06-26 PROCEDURE — 58300 INSERT INTRAUTERINE DEVICE: CPT | Mod: 51

## 2024-06-26 PROCEDURE — 7100000002 HC RECOVERY ROOM TIME - EACH INCREMENTAL 1 MINUTE: Performed by: OBSTETRICS & GYNECOLOGY

## 2024-06-26 PROCEDURE — 2720000007 HC OR 272 NO HCPCS: Performed by: OBSTETRICS & GYNECOLOGY

## 2024-06-26 PROCEDURE — 3700000002 HC GENERAL ANESTHESIA TIME - EACH INCREMENTAL 1 MINUTE: Performed by: OBSTETRICS & GYNECOLOGY

## 2024-06-26 PROCEDURE — 2500000004 HC RX 250 GENERAL PHARMACY W/ HCPCS (ALT 636 FOR OP/ED): Performed by: OBSTETRICS & GYNECOLOGY

## 2024-06-26 PROCEDURE — 58558 HYSTEROSCOPY BIOPSY: CPT

## 2024-06-26 PROCEDURE — 3700000001 HC GENERAL ANESTHESIA TIME - INITIAL BASE CHARGE: Performed by: OBSTETRICS & GYNECOLOGY

## 2024-06-26 RX ORDER — FENTANYL CITRATE 50 UG/ML
INJECTION, SOLUTION INTRAMUSCULAR; INTRAVENOUS AS NEEDED
Status: DISCONTINUED | OUTPATIENT
Start: 2024-06-26 | End: 2024-06-26

## 2024-06-26 RX ORDER — PROPOFOL 10 MG/ML
INJECTION, EMULSION INTRAVENOUS CONTINUOUS PRN
Status: DISCONTINUED | OUTPATIENT
Start: 2024-06-26 | End: 2024-06-26

## 2024-06-26 RX ORDER — OXYCODONE HYDROCHLORIDE 5 MG/1
5 TABLET ORAL EVERY 4 HOURS PRN
Status: DISCONTINUED | OUTPATIENT
Start: 2024-06-26 | End: 2024-06-26 | Stop reason: HOSPADM

## 2024-06-26 RX ORDER — LIDOCAINE HYDROCHLORIDE 20 MG/ML
INJECTION, SOLUTION EPIDURAL; INFILTRATION; INTRACAUDAL; PERINEURAL AS NEEDED
Status: DISCONTINUED | OUTPATIENT
Start: 2024-06-26 | End: 2024-06-26

## 2024-06-26 RX ORDER — SODIUM CHLORIDE, SODIUM LACTATE, POTASSIUM CHLORIDE, CALCIUM CHLORIDE 600; 310; 30; 20 MG/100ML; MG/100ML; MG/100ML; MG/100ML
100 INJECTION, SOLUTION INTRAVENOUS CONTINUOUS
Status: DISCONTINUED | OUTPATIENT
Start: 2024-06-26 | End: 2024-06-26 | Stop reason: HOSPADM

## 2024-06-26 RX ORDER — ONDANSETRON HYDROCHLORIDE 2 MG/ML
INJECTION, SOLUTION INTRAVENOUS AS NEEDED
Status: DISCONTINUED | OUTPATIENT
Start: 2024-06-26 | End: 2024-06-26

## 2024-06-26 RX ORDER — SODIUM CHLORIDE, SODIUM LACTATE, POTASSIUM CHLORIDE, CALCIUM CHLORIDE 600; 310; 30; 20 MG/100ML; MG/100ML; MG/100ML; MG/100ML
INJECTION, SOLUTION INTRAVENOUS CONTINUOUS PRN
Status: DISCONTINUED | OUTPATIENT
Start: 2024-06-26 | End: 2024-06-26

## 2024-06-26 RX ORDER — MIDAZOLAM HYDROCHLORIDE 1 MG/ML
INJECTION INTRAMUSCULAR; INTRAVENOUS AS NEEDED
Status: DISCONTINUED | OUTPATIENT
Start: 2024-06-26 | End: 2024-06-26

## 2024-06-26 RX ORDER — ONDANSETRON HYDROCHLORIDE 2 MG/ML
4 INJECTION, SOLUTION INTRAVENOUS ONCE AS NEEDED
Status: DISCONTINUED | OUTPATIENT
Start: 2024-06-26 | End: 2024-06-26 | Stop reason: HOSPADM

## 2024-06-26 RX ORDER — LIDOCAINE HYDROCHLORIDE 10 MG/ML
0.1 INJECTION, SOLUTION EPIDURAL; INFILTRATION; INTRACAUDAL; PERINEURAL ONCE
Status: DISCONTINUED | OUTPATIENT
Start: 2024-06-26 | End: 2024-06-26 | Stop reason: HOSPADM

## 2024-06-26 SDOH — HEALTH STABILITY: MENTAL HEALTH: CURRENT SMOKER: 0

## 2024-06-26 ASSESSMENT — COLUMBIA-SUICIDE SEVERITY RATING SCALE - C-SSRS
1. IN THE PAST MONTH, HAVE YOU WISHED YOU WERE DEAD OR WISHED YOU COULD GO TO SLEEP AND NOT WAKE UP?: NO
6. HAVE YOU EVER DONE ANYTHING, STARTED TO DO ANYTHING, OR PREPARED TO DO ANYTHING TO END YOUR LIFE?: NO
2. HAVE YOU ACTUALLY HAD ANY THOUGHTS OF KILLING YOURSELF?: NO

## 2024-06-26 ASSESSMENT — PAIN SCALES - GENERAL
PAINLEVEL_OUTOF10: 4
PAINLEVEL_OUTOF10: 7
PAINLEVEL_OUTOF10: 7
PAINLEVEL_OUTOF10: 3
PAINLEVEL_OUTOF10: 7
PAINLEVEL_OUTOF10: 4
PAIN_LEVEL: 4
PAINLEVEL_OUTOF10: 7

## 2024-06-26 ASSESSMENT — PAIN - FUNCTIONAL ASSESSMENT
PAIN_FUNCTIONAL_ASSESSMENT: 0-10

## 2024-06-26 ASSESSMENT — PAIN DESCRIPTION - DESCRIPTORS: DESCRIPTORS: ACHING;STABBING;THROBBING

## 2024-06-26 NOTE — ANESTHESIA PREPROCEDURE EVALUATION
Patient: Emilee Dempsey    Procedure Information       Date/Time: 06/26/24 1200    Procedures:       D & C Hysteroscopy      Insertion Mirena Intra Uterus Device    Location: U A OR 06 / Virtual [facility] A OR    Surgeons: Magen Flower MD            Relevant Problems   Cardiac   (+) Acute on chronic right-sided heart failure (Multi)   (+) Essential hypertension, benign   (+) HLD (hyperlipidemia)   (+) Non-cardiac chest pain   (+) PVC (premature ventricular contraction)   (+) Premature atrial complexes   (+) Right heart failure due to pulmonary hypertension (Multi)   (+) Tricuspid regurgitation      Pulmonary   (+) Asthma (HHS-HCC)   (+) VALDEZ (obstructive sleep apnea)   (+) Pulmonary arterial hypertension (Multi)   (+) Pulmonary hypertension (Multi)      Neuro   (+) Generalized anxiety disorder   (+) Recurrent major depressive disorder, in partial remission (CMS-HCC)      GI   (+) Chronic diarrhea   (+) GERD (gastroesophageal reflux disease)   (+) Irritable bowel syndrome      Endocrine   (+) Morbid obesity (Multi)      Hematology   (+) Idiopathic thrombocytopenic purpura (Multi)      Musculoskeletal   (+) DJD (degenerative joint disease)   (+) Fibromyalgia   (+) Lupus erythematosus   (+) Rheumatoid arthritis of multiple sites with negative rheumatoid factor (Multi)      GYN   (+) Abnormal uterine bleeding       Clinical information reviewed:   Tobacco  Allergies  Meds   Med Hx  Surg Hx  OB Status  Fam Hx  Soc   Hx        NPO Detail:  NPO/Void Status  Date of Last Liquid: 06/26/24  Time of Last Liquid: 0800  Date of Last Solid: 06/25/24  Time of Last Solid: 2300  Time of Last Void: 1139         Physical Exam    Airway  Mallampati: I  TM distance: >3 FB  Neck ROM: full     Cardiovascular - normal exam     Dental - normal exam     Pulmonary - normal exam     Abdominal - normal exam         Anesthesia Plan    History of general anesthesia?: yes  History of complications of general anesthesia?: no    ASA 2     MAC      The patient is not a current smoker.  Patient was not previously instructed to abstain from smoking on day of procedure.  Patient did not smoke on day of procedure.    intravenous induction   Postoperative administration of opioids is intended.  Anesthetic plan and risks discussed with patient.  Use of blood products discussed with patient who.    Plan discussed with CAA.

## 2024-06-26 NOTE — DISCHARGE INSTRUCTIONS
What care is needed at home?  You can shower without restrictions  Do not put anything in your vagina until cleared by your provider: no sex, douching, tampons  You can expect some bleeding from your vagina for a few weeks. You may use sanitary pads but not tampons. Vaginal spotting is normal, but if you have heavy bleeding through 1 pad per hour for 2 hours in a row, please call.  Your bowel movements may take some time to get back to normal. Eat small meals high in fiber to avoid hard stools. Drink 6 to 8 glasses of water each day.  What follow-up care is needed?  We will call you to schedule a follow up visit with your surgeon. Be sure to keep your visits.  What drugs may be needed?  You can take tylenol for pain.  What problems could happen?  Infection  Heavy blood loss  Blood clots in your legs or lungs  Damage to your bowel, bladder, and other organs inside the belly  Trouble passing bowel movements  When do I need to call the doctor?  Signs of infection such as a fever of 100.4°F (38°C) or higher, chills, pain with passing urine.  Excessive blood in your sanitary pads or more than six soaked pads in a day. (Spotting is normal).  Smelly green or dark yellow vaginal discharge  Upset stomach, throwing up, or very bad belly pain  Pain not helped by drugs you are taking  No bowel movement after 3 days  If you feel the need to pass urine but urine will not come out even after 6 hours  Swelling in your leg or arm that is much greater on one side than the other

## 2024-06-26 NOTE — ANESTHESIA POSTPROCEDURE EVALUATION
Patient: Emilee Dempsey    Procedure Summary       Date: 06/26/24 Room / Location: [facility] A OR 06 / Virtual [facility] A OR    Anesthesia Start: 1310 Anesthesia Stop: 1355    Procedures:       D & C Hysteroscopy      Insertion Mirena Intra Uterus Device Diagnosis:       Abnormal uterine bleeding      (Abnormal uterine bleeding [N93.9])    Surgeons: Magen Flower MD Responsible Provider: Polo Grant MD    Anesthesia Type: MAC ASA Status: 2            Anesthesia Type: MAC    Vitals Value Taken Time   /76 06/26/24 1400   Temp 36.1 °C (97 °F) 06/26/24 1347   Pulse 65 06/26/24 1400   Resp 16 06/26/24 1400   SpO2 100 % 06/26/24 1400       Anesthesia Post Evaluation    Patient location during evaluation: PACU  Patient participation: complete - patient participated  Level of consciousness: awake and alert  Pain score: 4  Pain management: adequate  Airway patency: patent  Cardiovascular status: acceptable  Respiratory status: acceptable  Hydration status: acceptable  Postoperative Nausea and Vomiting: none    No notable events documented.

## 2024-06-26 NOTE — H&P
GYN H&P    History Of Present Illness  Emilee Dempsey is a 43 y.o. female presenting for hysteroscopy D&C and IUD placement for AUB.     Past Medical History  She has a past medical history of Abnormal uterine bleeding (AUB), Acute on chronic systolic CHF (congestive heart failure) (Multi), Acute pericarditis ([facility]-HCC), Adverse effect of anesthesia, Alopecia areata, Anxiety, Asthma (HHS-HCC), Chronic headache, Chronic ITP (idiopathic thrombocytopenic purpura) (Multi), Chronic respiratory failure (Multi), Chronic sinusitis, Cushing's disease (Multi), Depression, Endometriosis, Eosinophilia, Fibromyalgia, Gastritis, Hypertension, Hypokalemia, Lumbar spondylosis, Lupus nephritis (Multi), PAH (pulmonary artery hypertension) (Multi), Personal history of pulmonary embolism, Raynaud disease, RLS (restless legs syndrome), SLE (systemic lupus erythematosus) (Multi), Sleep apnea, and TIA (transient ischemic attack).    Surgical History  She has a past surgical history that includes Other surgical history; Other surgical history (2018); Splenectomy, total (2018); Cervical conization w/ laser; Dilation and curettage of uterus; Hysteroscopy; Nasal septum surgery; and Other surgical history.    OB/GYN History  OB History    Para Term  AB Living   2 1   1 1 1   SAB IAB Ectopic Multiple Live Births           1      # Outcome Date GA Lbr Kadeem/2nd Weight Sex Delivery Anes PTL Lv   2 AB            1                    Social History  She reports that she has never smoked. She has never used smokeless tobacco. She reports that she does not currently use alcohol. She reports that she does not use drugs.     Allergies  Ciprofloxacin (bulk), Levaquin [levofloxacin], Adhesive tape-silicones, Atorvastatin, Chlorhexidine, Nsaids (non-steroidal anti-inflammatory drug), Cefadroxil, and Hibiclens [chlorhexidine gluconate]    Review of Systems   All other systems reviewed and are negative.         Physical  "Exam  Constitutional: No visible distress, alert and cooperative  Respiratory/Thorax: Normal respiratory effort on RA  Cardiovascular: Reg rate  Gastrointestinal: soft, nondistended  Neurological: A&Ox3  Psychological: Appropriate mood and behavior       Last Recorded Vitals  Blood pressure 123/69, pulse 69, temperature 36.2 °C (97.2 °F), temperature source Temporal, resp. rate 16, height 1.575 m (5' 2\"), weight 112 kg (246 lb 7.6 oz), SpO2 99%.    Relevant Results  5/2024 TVUS  IMPRESSION:  Unable to identify the left ovary. No gross left adnexal mass.      Small cyst versus prominent follicle in the right ovary measuring 30  mm in maximal diameter.      Small nabothian cyst.       Assessment/Plan   Principal Problem:    Abnormal uterine bleeding      Emilee Dempsey is a 43 y.o. female presenting for hysteroscopy D&C and IUD placement for AUB.     - No preop meds needed    Pt seen and d/w Dr. Mundo Machado MD PGY-3      "

## 2024-06-26 NOTE — OP NOTE
D & C Hysteroscopy, Insertion Mirena Intra Uterus Device Operative Note     Date: 2024  OR Location: [facility] A OR    Name: Emilee Dempsey, : 1980, Age: 43 y.o., MRN: [id number], Sex: female    Diagnosis  Pre-op Diagnosis     * Abnormal uterine bleeding [N93.9] Post-op Diagnosis     * Abnormal uterine bleeding [N93.9]     Procedures  D & C Hysteroscopy  47470 - MA HYSTEROSCOPY BX ENDOMETRIUM&/POLYPC W/WO D&C    Insertion Mirena Intra Uterus Device  05918 - MA INSERTION INTRAUTERINE DEVICE IUD      Surgeons      * Magen Flower - Primary    Resident/Fellow/Other Assistant:  Surgeons and Role:  * No surgeons found with a matching role *    Procedure Summary  Anesthesia: Monitor Anesthesia Care  ASA: II  Anesthesia Staff: Anesthesiologist: Polo Grant MD  C-AA: NATANAEL Merrill  Estimated Blood Loss: 10 mL  Intra-op Medications: Administrations occurring from 1200 to 1330 on 24:  * No intraprocedure medications in log *           Anesthesia Record               Intraprocedure I/O Totals          Intake    Propofol Drip 0.00 mL    The total shown is the total volume documented since Anesthesia Start was filed.    LR infusion 400.00 mL    Total Intake 400 mL          Specimen:   ID Type Source Tests Collected by Time   1 : ENDOMETRIAL CURETTINGS Tissue ENDOMETRIUM CURETTINGS SURGICAL PATHOLOGY EXAM Magen Flower MD 2024 1335        Staff:   Circulator: Marlin Angulo Person: Keyonna         Drains and/or Catheters: * None in log *    Tourniquet Times:         Implants:     Findings: Normal cavity    Indications: Emilee Dempsey is an 43 y.o. female who is having surgery for Abnormal uterine bleeding [N93.9].  Placement of Mirena IUD    The patient was seen in the preoperative area. The risks, benefits, complications, treatment options, non-operative alternatives, expected recovery and outcomes were discussed with the patient. The possibilities of reaction to medication, pulmonary aspiration,  injury to surrounding structures, bleeding, recurrent infection, the need for additional procedures, failure to diagnose a condition, and creating a complication requiring transfusion or operation were discussed with the patient. The patient concurred with the proposed plan, giving informed consent.  The site of surgery was properly noted/marked if necessary per policy. The patient has been actively warmed in preoperative area. Preoperative antibiotics are not indicated. Venous thrombosis prophylaxis are not indicated.    Procedure Details: Under satisfactory anesthesia patient was placed in a dorsolithotomy position.  A vaginal perineal prep was carried out patient was draped in usual manner.  Weighted speculum was placed in the anterior lip of cervix was grasped with single-tooth tenaculum.  Cervix easily dilated and a diagnostic scope was placed inside the uterine cavity.  Using a liquid medium axial inspection of the uterine cavity carried out both ostia identified fundus was clear no polyps fibroids or adhesions endometrium looked normal.  Hysteroscope was removed curettage was carried out with small amount of tissue obtained and sent for pathology no defects or perforations were noted.  A Mirena IUD was then placed in usual fashion without difficulty.  At this point procedure was terminated patient of recovery good condition estimated blood loss was less than 10 mL end of dictation  Complications:  None; patient tolerated the procedure well.    Disposition: PACU - hemodynamically stable.  Condition: stable     Attending Attestation: I was present for the entire procedure.    Magen Flower  Phone Number: [phone]

## 2024-06-28 ENCOUNTER — TELEPHONE (OUTPATIENT)
Dept: OBSTETRICS AND GYNECOLOGY | Facility: CLINIC | Age: 44
End: 2024-06-28
Payer: COMMERCIAL

## 2024-06-28 NOTE — TELEPHONE ENCOUNTER
RN called and verified patient  RN scheduled 2 week post op D&C hysteroscopy  placement of Mirena IUD visit  Martha Sidhu RN

## 2024-07-02 LAB
LABORATORY COMMENT REPORT: NORMAL
PATH REPORT.FINAL DX SPEC: NORMAL
PATH REPORT.GROSS SPEC: NORMAL
PATH REPORT.RELEVANT HX SPEC: NORMAL
PATH REPORT.TOTAL CANCER: NORMAL

## 2024-07-05 ENCOUNTER — TELEPHONE (OUTPATIENT)
Dept: OBSTETRICS AND GYNECOLOGY | Facility: CLINIC | Age: 44
End: 2024-07-05
Payer: COMMERCIAL

## 2024-07-05 ENCOUNTER — HOSPITAL ENCOUNTER (EMERGENCY)
Facility: HOSPITAL | Age: 44
Discharge: HOME | End: 2024-07-05
Attending: STUDENT IN AN ORGANIZED HEALTH CARE EDUCATION/TRAINING PROGRAM
Payer: COMMERCIAL

## 2024-07-05 ENCOUNTER — APPOINTMENT (OUTPATIENT)
Dept: RADIOLOGY | Facility: HOSPITAL | Age: 44
End: 2024-07-05
Payer: COMMERCIAL

## 2024-07-05 VITALS
OXYGEN SATURATION: 95 % | TEMPERATURE: 98.2 F | HEART RATE: 62 BPM | DIASTOLIC BLOOD PRESSURE: 69 MMHG | SYSTOLIC BLOOD PRESSURE: 144 MMHG

## 2024-07-05 DIAGNOSIS — G89.18 ACUTE POST-OPERATIVE PAIN: Primary | ICD-10-CM

## 2024-07-05 LAB
ALBUMIN SERPL BCP-MCNC: 3.5 G/DL (ref 3.4–5)
ALP SERPL-CCNC: 61 U/L (ref 33–110)
ALT SERPL W P-5'-P-CCNC: 32 U/L (ref 7–45)
ANION GAP SERPL CALC-SCNC: 11 MMOL/L (ref 10–20)
APPEARANCE UR: CLEAR
AST SERPL W P-5'-P-CCNC: 19 U/L (ref 9–39)
BASOPHILS # BLD AUTO: 0.09 X10*3/UL (ref 0–0.1)
BASOPHILS NFR BLD AUTO: 0.7 %
BILIRUB SERPL-MCNC: 0.4 MG/DL (ref 0–1.2)
BILIRUB UR STRIP.AUTO-MCNC: NEGATIVE MG/DL
BUN SERPL-MCNC: 10 MG/DL (ref 6–23)
CALCIUM SERPL-MCNC: 9 MG/DL (ref 8.6–10.3)
CHLORIDE SERPL-SCNC: 104 MMOL/L (ref 98–107)
CO2 SERPL-SCNC: 24 MMOL/L (ref 21–32)
COLOR UR: YELLOW
CREAT SERPL-MCNC: 0.71 MG/DL (ref 0.5–1.05)
EGFRCR SERPLBLD CKD-EPI 2021: >90 ML/MIN/1.73M*2
EOSINOPHIL # BLD AUTO: 1.03 X10*3/UL (ref 0–0.7)
EOSINOPHIL NFR BLD AUTO: 8.4 %
ERYTHROCYTE [DISTWIDTH] IN BLOOD BY AUTOMATED COUNT: 18.6 % (ref 11.5–14.5)
GLUCOSE SERPL-MCNC: 80 MG/DL (ref 74–99)
GLUCOSE UR STRIP.AUTO-MCNC: NORMAL MG/DL
HCT VFR BLD AUTO: 45.9 % (ref 36–46)
HGB BLD-MCNC: 14.9 G/DL (ref 12–16)
IMM GRANULOCYTES # BLD AUTO: 0.04 X10*3/UL (ref 0–0.7)
IMM GRANULOCYTES NFR BLD AUTO: 0.3 % (ref 0–0.9)
KETONES UR STRIP.AUTO-MCNC: NEGATIVE MG/DL
LEUKOCYTE ESTERASE UR QL STRIP.AUTO: NEGATIVE
LYMPHOCYTES # BLD AUTO: 3.16 X10*3/UL (ref 1.2–4.8)
LYMPHOCYTES NFR BLD AUTO: 25.9 %
MCH RBC QN AUTO: 29.4 PG (ref 26–34)
MCHC RBC AUTO-ENTMCNC: 32.5 G/DL (ref 32–36)
MCV RBC AUTO: 91 FL (ref 80–100)
MONOCYTES # BLD AUTO: 0.99 X10*3/UL (ref 0.1–1)
MONOCYTES NFR BLD AUTO: 8.1 %
NEUTROPHILS # BLD AUTO: 6.91 X10*3/UL (ref 1.2–7.7)
NEUTROPHILS NFR BLD AUTO: 56.6 %
NITRITE UR QL STRIP.AUTO: NEGATIVE
NRBC BLD-RTO: 0 /100 WBCS (ref 0–0)
PH UR STRIP.AUTO: 5.5 [PH]
PLATELET # BLD AUTO: 438 X10*3/UL (ref 150–450)
POTASSIUM SERPL-SCNC: 3.8 MMOL/L (ref 3.5–5.3)
PROT SERPL-MCNC: 6.5 G/DL (ref 6.4–8.2)
PROT UR STRIP.AUTO-MCNC: NEGATIVE MG/DL
RBC # BLD AUTO: 5.06 X10*6/UL (ref 4–5.2)
RBC # UR STRIP.AUTO: NEGATIVE /UL
SODIUM SERPL-SCNC: 135 MMOL/L (ref 136–145)
SP GR UR STRIP.AUTO: 1.01
UROBILINOGEN UR STRIP.AUTO-MCNC: NORMAL MG/DL
WBC # BLD AUTO: 12.2 X10*3/UL (ref 4.4–11.3)

## 2024-07-05 PROCEDURE — 99284 EMERGENCY DEPT VISIT MOD MDM: CPT | Mod: 25

## 2024-07-05 PROCEDURE — 36415 COLL VENOUS BLD VENIPUNCTURE: CPT | Performed by: NURSE PRACTITIONER

## 2024-07-05 PROCEDURE — 80053 COMPREHEN METABOLIC PANEL: CPT | Performed by: NURSE PRACTITIONER

## 2024-07-05 PROCEDURE — 76830 TRANSVAGINAL US NON-OB: CPT | Performed by: STUDENT IN AN ORGANIZED HEALTH CARE EDUCATION/TRAINING PROGRAM

## 2024-07-05 PROCEDURE — 81003 URINALYSIS AUTO W/O SCOPE: CPT | Performed by: NURSE PRACTITIONER

## 2024-07-05 PROCEDURE — 85025 COMPLETE CBC W/AUTO DIFF WBC: CPT | Performed by: NURSE PRACTITIONER

## 2024-07-05 PROCEDURE — 76856 US EXAM PELVIC COMPLETE: CPT

## 2024-07-05 PROCEDURE — 2500000004 HC RX 250 GENERAL PHARMACY W/ HCPCS (ALT 636 FOR OP/ED): Performed by: STUDENT IN AN ORGANIZED HEALTH CARE EDUCATION/TRAINING PROGRAM

## 2024-07-05 PROCEDURE — 76856 US EXAM PELVIC COMPLETE: CPT | Performed by: STUDENT IN AN ORGANIZED HEALTH CARE EDUCATION/TRAINING PROGRAM

## 2024-07-05 RX ORDER — HYDROMORPHONE HYDROCHLORIDE 1 MG/ML
1 INJECTION, SOLUTION INTRAMUSCULAR; INTRAVENOUS; SUBCUTANEOUS ONCE
Status: COMPLETED | OUTPATIENT
Start: 2024-07-05 | End: 2024-07-05

## 2024-07-05 RX ORDER — OXYCODONE HYDROCHLORIDE 5 MG/1
5 CAPSULE ORAL EVERY 6 HOURS PRN
Qty: 12 CAPSULE | Refills: 0 | Status: SHIPPED | OUTPATIENT
Start: 2024-07-05 | End: 2024-07-08

## 2024-07-05 RX ORDER — OXYCODONE HYDROCHLORIDE 5 MG/1
5 CAPSULE ORAL EVERY 6 HOURS PRN
Qty: 12 CAPSULE | Refills: 0 | Status: SHIPPED | OUTPATIENT
Start: 2024-07-05 | End: 2024-07-05

## 2024-07-05 RX ORDER — ONDANSETRON HYDROCHLORIDE 2 MG/ML
4 INJECTION, SOLUTION INTRAVENOUS ONCE
Status: COMPLETED | OUTPATIENT
Start: 2024-07-05 | End: 2024-07-05

## 2024-07-05 ASSESSMENT — LIFESTYLE VARIABLES
HAVE YOU EVER FELT YOU SHOULD CUT DOWN ON YOUR DRINKING: NO
EVER FELT BAD OR GUILTY ABOUT YOUR DRINKING: NO
EVER HAD A DRINK FIRST THING IN THE MORNING TO STEADY YOUR NERVES TO GET RID OF A HANGOVER: NO
TOTAL SCORE: 0
HAVE PEOPLE ANNOYED YOU BY CRITICIZING YOUR DRINKING: NO

## 2024-07-05 ASSESSMENT — COLUMBIA-SUICIDE SEVERITY RATING SCALE - C-SSRS
6. HAVE YOU EVER DONE ANYTHING, STARTED TO DO ANYTHING, OR PREPARED TO DO ANYTHING TO END YOUR LIFE?: NO
1. IN THE PAST MONTH, HAVE YOU WISHED YOU WERE DEAD OR WISHED YOU COULD GO TO SLEEP AND NOT WAKE UP?: NO
2. HAVE YOU ACTUALLY HAD ANY THOUGHTS OF KILLING YOURSELF?: NO

## 2024-07-05 NOTE — ED TRIAGE NOTES
PT ARRIVAL TO ED FROM PRIVATE AUTO FROM HOME WITH WORSENING PAIN AFTER SURGERY ON 26TH DNC, HYSTEROSCOPY, BIOPSY, IUD.  ENDORSES SPOTTING AFTER SURGERY FOR FEW DAYS THAT HAS NOW STOPPED. DENIES FEVER. PAIN IS ON RIGHT SIDE.

## 2024-07-05 NOTE — TELEPHONE ENCOUNTER
Returned patients call informed her that I had contacted you . I asked her if she listen to your message she said no because it was a private number. I let hwer know that  called her  and He wanted her to go to the emergency room for her pain she was having .She told me [facility] was too fsar and she would go to [facility] .

## 2024-07-05 NOTE — ED TRIAGE NOTES
This patient was seen and examined in triage    HPI:  Patient is nontoxic-appearing 43-year-old female with past medical history of idiopathic thrombocytopenic purpura, Cushing's disease, fibromyalgia, degenerative joint disease, hypertension, esophageal reflux, hyperlipidemia, IBS, obesity, pulmonary arterial hypertension, Raynaud's disease, lupus erythematosus, asthma, tricuspid regurgitation, presents to the emergency today for complaint of right-sided pelvic discomfort.  Patient states about 10 days ago she had IUD placed.  Patient states since the surgery sleep having right-sided pelvic discomfort.  Patient's initially she was experiencing some vaginal spotting however has resolved and she is not current experiencing any vaginal bleeding or discharge.  Patient denies any urinary issues, new back pain, abdominal pain, nausea,, diarrhea or constipation, fever, shaking, or chills.    Focused PE:  Gen: Well-appearing, not in acute distress  Cardiovascular: Regular rate, normal rhythm, no murmur, no gallop  Respiratory: No adventitious lung sounds auscultated.  Abdomen: No reproducible abdominal tenderness upon palpation,  physical exam may be limited by patient positioning sitting up in a chair  Neuro:  Alert and Oriented, speech clear and coherent    Plan:  Lab work ordered from triage including transvaginal ultrasound.    For the remainder the patient's work-up and ED course, please see the main ED provider note.  We discussed need for diagnostic testing including lab studies and imaging.  We also discussed that they may be asked to wait in the waiting room while these test are pending.  They understand that if they choose to leave without having the testing completed or resulted that we cannot rule out acute life-threatening illnesses and the risks involved to lead to worsening condition, permanent disability or even death.

## 2024-07-06 NOTE — ED PROVIDER NOTES
EMERGENCY MEDICINE EVALUATION NOTE    History of Present Illness     Chief Complaint:   Chief Complaint   Patient presents with    Post-op Problem       HPI: Emilee Dempsey is a 43 y.o. female with past medical history of idiopathic thrombocytopenic purpura, Cushing's disease, fibromyalgia, degenerative joint disease, hypertension, esophageal reflux, hyperlipidemia, IBS, obesity, pulmonary arterial hypertension, Raynaud's disease, lupus erythematosus, asthma, tricuspid regurgitation,  who presents with complaint of postoperative problem.  Patient states around 10 days ago she had a IUD placed after hysteroscopy and biopsy.  She states since then she has had persistent pain although without any worsening mainly in her right lower pelvic area.  He states she has Roxicodone at home that she takes 4 times daily and was taking this with minimal relief although ran out of her oxycodone several days ago.  She states she called her OB/GYN who told her to come in for evaluation due to her worsening pain.  She does admit nausea without emesis.  Denies any dysuria, hematuria, change in bowel movements, fever, chills.    Previous History     Past Medical History:   Diagnosis Date    Abnormal uterine bleeding (AUB)     Acute on chronic systolic CHF (congestive heart failure) (Multi)     last saw cardiology 2020 - appt needed, pt working on making appt.  Currently on cancellation list - scheduled for 8/7 (after surgery) pt aware needs cardiology appt prior to procedure    Acute pericarditis ([facility])     admitted 3/2024, resolved per pulm note.    Adverse effect of anesthesia     ashtma attack when waking up    Alopecia areata     Anxiety     Asthma ([facility])     Chronic headache     Chronic ITP (idiopathic thrombocytopenic purpura) (Multi)     s/p splenectomy.  4.30.24 - plt 407    Chronic respiratory failure (Multi)     on 2-3L baseline    Chronic sinusitis     Cushing's disease (Multi)     Depression     Endometriosis      "Eosinophilia     Fibromyalgia     Gastritis     Hypertension     Hypokalemia     4.30.24: K 3.4 - oral supplement    Lumbar spondylosis     Lupus nephritis (Multi)     GFR 79, BUN 26, creat 0.93    PAH (pulmonary artery hypertension) (Multi)     follows with Dr. Aponte at [facility] - sotatercept, Winrevair NYHA class 3    Personal history of pulmonary embolism      - while on oral birth control    Raynaud disease     RLS (restless legs syndrome)     SLE (systemic lupus erythematosus) (Multi)     Sleep apnea     CPAP with O2 bleed in    TIA (transient ischemic attack)     15 years ago per pt, residual memory issues     Past Surgical History:   Procedure Laterality Date    CERVICAL CONIZATION   W/ LASER      x 3    DILATION AND CURETTAGE OF UTERUS      HYSTEROSCOPY      NASAL SEPTUM SURGERY      OTHER SURGICAL HISTORY      Laparoscopy/hysteroscopy    OTHER SURGICAL HISTORY  2018    Surgically induced     OTHER SURGICAL HISTORY      RFA - back    SPLENECTOMY, TOTAL  2018    Splenectomy     Social History     Tobacco Use    Smoking status: Never    Smokeless tobacco: Never   Vaping Use    Vaping status: Never Used   Substance Use Topics    Alcohol use: Not Currently     Comment: rarely    Drug use: Never     Family History   Problem Relation Name Age of Onset    Heart failure Father      Stroke Father       Allergies   Allergen Reactions    Ciprofloxacin (Bulk) Anaphylaxis    Levaquin [Levofloxacin] Anaphylaxis    Adhesive Tape-Silicones Rash     EKG PADS CAUSE WELTS/RASH. \"RED DOT OR PEDIATRIC LEADS ARE OK\"    Rash also from adhesive remover     Atorvastatin Myalgia    Chlorhexidine Itching and Rash     Patient states it is the red dye in hibiclens    Red wash     Pt states she develops severe rash with chlorhexadine baths.    Nsaids (Non-Steroidal Anti-Inflammatory Drug) Other     2/2 lupus nephritis     Cefadroxil Cough    Hibiclens [Chlorhexidine Gluconate] Itching and Rash     Current Outpatient " Medications   Medication Instructions    acetaminophen (TYLENOL) 1,000 mg, oral, Every 8 hours PRN    albuterol 90 mcg/actuation inhaler 2 puffs, inhalation, Every 4 hours PRN    aspirin 81 mg chewable tablet oral    B6/folic/B12/coffee/phosphatid (NEURIVA PLUS BRAIN PERFORMANCE ORAL) oral, Daily    biotin 2,500 mcg capsule 1 capsule, oral, Daily RT    bumetanide (Bumex) 2 mg tablet 5 tablets, oral, Daily    buPROPion XL (Wellbutrin XL) 150 mg 24 hr tablet Take 1 Tablet by mouth every morning With one 300 mg tablet    buPROPion XL (WELLBUTRIN XL) 300 mg, oral, Daily    calcium carbonate (TUMS) 1,000 mg, oral, Every 8 hours PRN    carboxymethylcellulose (Refresh Celluvisc) 1 % ophthalmic solution dropperette ophthalmic (eye), 4 times daily    coenzyme Q-10 200 mg capsule oral    cyproheptadine (PERIACTIN) 4 mg, oral, As needed    desvenlafaxine (PRISTIQ) 100 mg, oral, Daily    diphenhydrAMINE (BENADRYL) 50 mg, oral, Every 8 hours PRN    eptinezumab (VYEPTI) 300 mg, intravenous, Every 3 months    ergocalciferol (VITAMIN D-2) 50,000 Units, oral, 2 times weekly, Th-Sa    fluocinonide (Lidex) 0.05 % external solution Apply twice daily Monday through Friday . Take weekends off.    fluticasone (Flonase) 50 mcg/actuation nasal spray 2 sprays, Does not apply, 2 times daily    folic acid (FOLVITE) 1 mg, oral, Daily RT    gabapentin (NEURONTIN) 1,200 mg, oral    hydroxychloroquine (PLAQUENIL) 200 mg, oral, 2 times daily    icosapent ethyL (VASCEPA) 1 g, oral, Daily RT    ketoconazole (NIZOral) 2 % shampoo     ketotifen (Zaditor) 0.025 % (0.035 %) ophthalmic solution 1 drop, Both Eyes, 2 times daily    levalbuterol (Xopenex) 1.25 mg/3 mL nebulizer solution 3 mL, inhalation, 3 times daily PRN    lidocaine (Xylocaine) 5 % ointment if needed.    loperamide (IMODIUM) 4 mg, oral    loratadine (CLARITIN) 10 mg, oral, Daily RT    LORazepam (ATIVAN) 2 mg, oral, 3 times daily PRN    magnesium oxide (Mag-Ox) 400 mg (241.3 mg magnesium)  tablet oral, 2 times daily    metOLazone (ZAROXOLYN) 5 mg, oral, 3 times weekly, M-w-f    milnacipran (SAVELLA) 100 mg, oral, 2 times daily    moisturizing mouth (Biotene Oral Dry Mouth) solution 2 sprays, oral, Every 4 hours PRN    mometasone-formoterol (Dulera 200) 200-5 mcg/actuation inhaler inhalation, Every 12 hours    montelukast (SINGULAIR) 10 mg, oral, Daily RT    multivitamin with minerals (multivit-min-iron fum-folic ac) tablet oral    ondansetron (ZOFRAN) 8 mg, oral, Every 8 hours PRN    One Daily Essential tablet     Opsumit 10 mg, oral, Daily RT    oxyCODONE (OXY-IR) 5 mg, oral, Every 6 hours PRN    pantoprazole (ProtoNix) 20 mg EC tablet oral, Every 12 hours    potassium chloride (Klor-Con) 20 mEq packet 20 mEq, oral, 4 times daily    predniSONE (Deltasone) 5 mg tablet Take by mouth once daily:  6 tablets - 5 days, 5 tablets - 5 days, 4 tablets - 5 days, 3 tablets - 5 days, 2 tablets - 5 days, 1 tablet - 5 days, then STOP.    Refresh Optive Advanced 0.5-1-0.5 % drops 1 drop, ophthalmic (eye), 4 times daily    Rexulti 3 mg tablet 1 tablet, oral, Daily    rOPINIRole (REQUIP) 1 mg, oral, 3 times daily    Saphnelo 300 mg, intravenous, Every 28 days    sildenafil (Revatio) 20 mg tablet TAKE 4 TABLETS THREE TIMES A DAY    sotatercept-csrk 45 mg kit 0.7 mL, subcutaneous, Every 21 days    spironolactone (ALDACTONE) 400 mg, oral, Daily    sulfaSALAzine (AZULFIDINE) 1,000 mg, oral, 3 times daily    tiZANidine (ZANAFLEX) 4 mg, oral, Every 8 hours PRN    traZODone (Desyrel) 100 mg tablet Take 1-2 Tablets by mouth nightly at bedtime as needed    Ubrelvy 100 mg tablet tablet Take 1 tablet by mouth at onset of migraine/headache. May repeat dose in 2 hours if needed. Do NOT take more than 2 tablets in 24 hours. Max dose is 200 mg in 24 hours. No more than 16 tabs per month.    VITAMIN B COMPLEX ORAL 1 tablet, oral, Daily RT       Physical Exam     Appearance: Alert, oriented , cooperative,  in acute distress. Well  nourished & well hydrated.     Skin: Intact,  dry skin, no lesions, rash, petechiae or purpura.      Eyes: PERRLA, EOMs intact,  Conjunctiva pink with no redness or exudates. Cornea & anterior chamber are clear, Eyelids without lesions. No scleral icterus.      ENT: Hearing grossly intact. External auditory canals patent, tympanic membranes intact with visible landmarks. Nares patent, mucus membranes moist. Dentition without lesions. Pharynx clear, uvula midline.      Neck: Supple, without meningismus. Thyroid not palpable. Trachea at midline. No lymphadenopathy.     Pulmonary: Clear bilaterally with good chest wall excursion. No rales, rhonchi or wheezing. No accessory muscle use or stridor.     Cardiac: Normal S1, S2 without murmur, rub, gallop or extrasystole. No JVD, Carotids without bruits.     Abdomen: Soft, moderate tenderness in the right lower pelvic area, active bowel sounds.  No palpable organomegaly.  No rebound or guarding.  No CVA tenderness.     Genitourinary: Exam deferred.     Musculoskeletal: Full range of motion. no pain, edema, or deformity. Pulses full and equal. No cyanosis or clubbing.      Neurological:  Cranial nerves II through XII are grossly intact, finger-nose touch is normal, normal sensation, no weakness, no focal findings identified.     Psychiatric: Appropriate mood and affect.      Results     Labs Reviewed   CBC WITH AUTO DIFFERENTIAL - Abnormal       Result Value    WBC 12.2 (*)     nRBC 0.0      RBC 5.06      Hemoglobin 14.9      Hematocrit 45.9      MCV 91      MCH 29.4      MCHC 32.5      RDW 18.6 (*)     Platelets 438      Neutrophils % 56.6      Immature Granulocytes %, Automated 0.3      Lymphocytes % 25.9      Monocytes % 8.1      Eosinophils % 8.4      Basophils % 0.7      Neutrophils Absolute 6.91      Immature Granulocytes Absolute, Automated 0.04      Lymphocytes Absolute 3.16      Monocytes Absolute 0.99      Eosinophils Absolute 1.03 (*)     Basophils Absolute 0.09      COMPREHENSIVE METABOLIC PANEL - Abnormal    Glucose 80      Sodium 135 (*)     Potassium 3.8      Chloride 104      Bicarbonate 24      Anion Gap 11      Urea Nitrogen 10      Creatinine 0.71      eGFR >90      Calcium 9.0      Albumin 3.5      Alkaline Phosphatase 61      Total Protein 6.5      AST 19      Bilirubin, Total 0.4      ALT 32     URINALYSIS WITH REFLEX MICROSCOPIC - Normal    Color, Urine Yellow      Appearance, Urine Clear      Specific Gravity, Urine 1.009      pH, Urine 5.5      Protein, Urine NEGATIVE      Glucose, Urine Normal      Blood, Urine NEGATIVE      Ketones, Urine NEGATIVE      Bilirubin, Urine NEGATIVE      Urobilinogen, Urine Normal      Nitrite, Urine NEGATIVE      Leukocyte Esterase, Urine NEGATIVE       US PELVIS TRANSABDOMINAL WITH TRANSVAGINAL   Final Result   The IUD appears within normal position to the extent assessed noting   that transvaginal portion was not completed due to patient   intolerance.        No acute abnormality of the ovaries.        MACRO:   None.        Signed by: Rachid Natarajan 7/5/2024 8:47 PM   Dictation workstation:   [id number]            ED Course & Medical Decision Making     Medications   HYDROmorphone (Dilaudid) injection 1 mg (1 mg intravenous Given 7/5/24 2255)   ondansetron (Zofran) injection 4 mg (4 mg intravenous Given 7/5/24 2255)     Diagnoses as of 07/05/24 2258   Acute post-operative pain     Heart Rate:  [62]   Temperature:  [36.8 °C (98.2 °F)]   BP: (144)/(69)   Pulse Ox:  [95 %]       Emilee Dempsey is a 43 y.o. female with past medical history of idiopathic thrombocytopenic purpura, Cushing's disease, fibromyalgia, degenerative joint disease, hypertension, esophageal reflux, hyperlipidemia, IBS, obesity, pulmonary arterial hypertension, Raynaud's disease, lupus erythematosus, asthma, tricuspid regurgitation,  who presents with complaint of postoperative problem.  Differential includes but is not limited to postoperative  complication such as IUD misplacement, uterine perforation, ovarian torsion, or other abdominal pathology.  Patient otherwise hemodynamic stable and afebrile.  Well-appearing.  She was provided IV Dilaudid as well as Zofran for pain control.  Lab work reassuring other than mild leukocytosis of 12.2 likely reactive in nature.  No anemia, renal dysfunction, or electrolyte normality.  Urinalysis nonacute.  Pelvic ultrasound was completed although she was unable to tolerate transvaginal.  She did receive transabdominal and this did show a intact IUD in the normal position with no acute abnormality of the ovaries or other significant acute finding.  Likely this is postoperative and from her IUD placement.  She did feel stable from discharge at this time and was given a 3-day refill of her oxycodone.  She will follow-up with her OB/GYN for additional evaluation.    Procedures   Procedures    Diagnosis     1. Acute post-operative pain        Disposition     DISCHARGE.  The patient is discharged back to their place of residence.  Discharge diagnosis, instructions and plan were discussed and understood. At the time of discharge the patient was comfortable and was in no apparent distress. Patient is aware of diagnostic uncertainty and was notified though testing is negative here, there is a very small chance that pathology may be missed.  The patient understands these risks and the patient /family understood to return immediately to the emergency department if the symptoms worsen or if they have any additional concerns.    FOLLOW UP  Primary care provider in 1-2 days.        ED Prescriptions       Medication Sig Dispense Start Date End Date Auth. Provider    oxyCODONE (Oxy-IR) 5 mg immediate release capsule Take 1 capsule (5 mg) by mouth every 6 hours if needed for severe pain (7 - 10) for up to 3 days. 12 capsule 7/5/2024 7/8/2024 DO Angelo Ortiz DO  07/05/24 5148

## 2024-07-07 ENCOUNTER — HOSPITAL ENCOUNTER (EMERGENCY)
Facility: HOSPITAL | Age: 44
Discharge: HOME | End: 2024-07-07
Attending: EMERGENCY MEDICINE
Payer: COMMERCIAL

## 2024-07-07 ENCOUNTER — APPOINTMENT (OUTPATIENT)
Dept: RADIOLOGY | Facility: HOSPITAL | Age: 44
End: 2024-07-07
Payer: COMMERCIAL

## 2024-07-07 ENCOUNTER — HOSPITAL ENCOUNTER (EMERGENCY)
Facility: HOSPITAL | Age: 44
Discharge: OTHER INSTITUTION | End: 2024-07-07
Attending: STUDENT IN AN ORGANIZED HEALTH CARE EDUCATION/TRAINING PROGRAM
Payer: COMMERCIAL

## 2024-07-07 VITALS
HEIGHT: 62 IN | TEMPERATURE: 98.2 F | HEART RATE: 66 BPM | RESPIRATION RATE: 19 BRPM | DIASTOLIC BLOOD PRESSURE: 68 MMHG | WEIGHT: 242 LBS | OXYGEN SATURATION: 99 % | BODY MASS INDEX: 44.53 KG/M2 | SYSTOLIC BLOOD PRESSURE: 143 MMHG

## 2024-07-07 VITALS
SYSTOLIC BLOOD PRESSURE: 138 MMHG | WEIGHT: 242.51 LBS | TEMPERATURE: 98.3 F | DIASTOLIC BLOOD PRESSURE: 78 MMHG | HEIGHT: 62 IN | BODY MASS INDEX: 44.63 KG/M2 | OXYGEN SATURATION: 99 % | HEART RATE: 68 BPM | RESPIRATION RATE: 17 BRPM

## 2024-07-07 DIAGNOSIS — Z30.432 ENCOUNTER FOR IUD REMOVAL: ICD-10-CM

## 2024-07-07 DIAGNOSIS — R10.2 PELVIC PAIN: Primary | ICD-10-CM

## 2024-07-07 LAB
ANION GAP SERPL CALC-SCNC: 11 MMOL/L (ref 10–20)
APPEARANCE UR: CLEAR
BASOPHILS # BLD AUTO: 0.09 X10*3/UL (ref 0–0.1)
BASOPHILS NFR BLD AUTO: 0.9 %
BILIRUB UR STRIP.AUTO-MCNC: NEGATIVE MG/DL
BUN SERPL-MCNC: 9 MG/DL (ref 6–23)
C TRACH RRNA SPEC QL NAA+PROBE: NEGATIVE
CALCIUM SERPL-MCNC: 9 MG/DL (ref 8.6–10.3)
CHLORIDE SERPL-SCNC: 103 MMOL/L (ref 98–107)
CLUE CELLS SPEC QL WET PREP: NORMAL
CLUE CELLS VAG LPF-#/AREA: NORMAL /[LPF]
CO2 SERPL-SCNC: 25 MMOL/L (ref 21–32)
COLOR UR: ABNORMAL
CREAT SERPL-MCNC: 0.84 MG/DL (ref 0.5–1.05)
EGFRCR SERPLBLD CKD-EPI 2021: 89 ML/MIN/1.73M*2
EOSINOPHIL # BLD AUTO: 0.83 X10*3/UL (ref 0–0.7)
EOSINOPHIL NFR BLD AUTO: 8.3 %
ERYTHROCYTE [DISTWIDTH] IN BLOOD BY AUTOMATED COUNT: 18.2 % (ref 11.5–14.5)
GLUCOSE SERPL-MCNC: 71 MG/DL (ref 74–99)
GLUCOSE UR STRIP.AUTO-MCNC: NORMAL MG/DL
HCG UR QL IA.RAPID: NEGATIVE
HCT VFR BLD AUTO: 44.5 % (ref 36–46)
HGB BLD-MCNC: 14.8 G/DL (ref 12–16)
HOLD SPECIMEN: NORMAL
IMM GRANULOCYTES # BLD AUTO: 0.04 X10*3/UL (ref 0–0.7)
IMM GRANULOCYTES NFR BLD AUTO: 0.4 % (ref 0–0.9)
KETONES UR STRIP.AUTO-MCNC: NEGATIVE MG/DL
LEUKOCYTE ESTERASE UR QL STRIP.AUTO: NEGATIVE
LYMPHOCYTES # BLD AUTO: 3.06 X10*3/UL (ref 1.2–4.8)
LYMPHOCYTES NFR BLD AUTO: 30.5 %
MCH RBC QN AUTO: 29.7 PG (ref 26–34)
MCHC RBC AUTO-ENTMCNC: 33.3 G/DL (ref 32–36)
MCV RBC AUTO: 89 FL (ref 80–100)
MONOCYTES # BLD AUTO: 0.99 X10*3/UL (ref 0.1–1)
MONOCYTES NFR BLD AUTO: 9.9 %
N GONORRHOEA DNA SPEC QL PROBE+SIG AMP: NEGATIVE
NEUTROPHILS # BLD AUTO: 5.03 X10*3/UL (ref 1.2–7.7)
NEUTROPHILS NFR BLD AUTO: 50 %
NITRITE UR QL STRIP.AUTO: NEGATIVE
NRBC BLD-RTO: 0 /100 WBCS (ref 0–0)
NUGENT SCORE: 1
PH UR STRIP.AUTO: 5.5 [PH]
PLATELET # BLD AUTO: 376 X10*3/UL (ref 150–450)
POTASSIUM SERPL-SCNC: 3.5 MMOL/L (ref 3.5–5.3)
PROT UR STRIP.AUTO-MCNC: NEGATIVE MG/DL
RBC # BLD AUTO: 4.99 X10*6/UL (ref 4–5.2)
RBC # UR STRIP.AUTO: NEGATIVE /UL
SODIUM SERPL-SCNC: 135 MMOL/L (ref 136–145)
SP GR UR STRIP.AUTO: 1
T VAGINALIS RRNA SPEC QL NAA+PROBE: NEGATIVE
T VAGINALIS SPEC QL WET PREP: NORMAL
TRICHOMONAS REFLEX COMMENT: NORMAL
UROBILINOGEN UR STRIP.AUTO-MCNC: NORMAL MG/DL
WBC # BLD AUTO: 10 X10*3/UL (ref 4.4–11.3)
WBC VAG QL WET PREP: NORMAL
YEAST VAG QL WET PREP: NORMAL
YEAST VAG WET PREP-#/AREA: NORMAL

## 2024-07-07 PROCEDURE — 96361 HYDRATE IV INFUSION ADD-ON: CPT | Performed by: STUDENT IN AN ORGANIZED HEALTH CARE EDUCATION/TRAINING PROGRAM

## 2024-07-07 PROCEDURE — 87205 SMEAR GRAM STAIN: CPT | Mod: PARLAB | Performed by: STUDENT IN AN ORGANIZED HEALTH CARE EDUCATION/TRAINING PROGRAM

## 2024-07-07 PROCEDURE — 96375 TX/PRO/DX INJ NEW DRUG ADDON: CPT

## 2024-07-07 PROCEDURE — 2550000001 HC RX 255 CONTRASTS: Performed by: STUDENT IN AN ORGANIZED HEALTH CARE EDUCATION/TRAINING PROGRAM

## 2024-07-07 PROCEDURE — 36415 COLL VENOUS BLD VENIPUNCTURE: CPT | Performed by: STUDENT IN AN ORGANIZED HEALTH CARE EDUCATION/TRAINING PROGRAM

## 2024-07-07 PROCEDURE — 80048 BASIC METABOLIC PNL TOTAL CA: CPT | Performed by: STUDENT IN AN ORGANIZED HEALTH CARE EDUCATION/TRAINING PROGRAM

## 2024-07-07 PROCEDURE — 2500000004 HC RX 250 GENERAL PHARMACY W/ HCPCS (ALT 636 FOR OP/ED)

## 2024-07-07 PROCEDURE — 87210 SMEAR WET MOUNT SALINE/INK: CPT | Mod: XU | Performed by: STUDENT IN AN ORGANIZED HEALTH CARE EDUCATION/TRAINING PROGRAM

## 2024-07-07 PROCEDURE — 96374 THER/PROPH/DIAG INJ IV PUSH: CPT

## 2024-07-07 PROCEDURE — 99284 EMERGENCY DEPT VISIT MOD MDM: CPT | Mod: 25

## 2024-07-07 PROCEDURE — 2500000004 HC RX 250 GENERAL PHARMACY W/ HCPCS (ALT 636 FOR OP/ED): Performed by: STUDENT IN AN ORGANIZED HEALTH CARE EDUCATION/TRAINING PROGRAM

## 2024-07-07 PROCEDURE — 74177 CT ABD & PELVIS W/CONTRAST: CPT

## 2024-07-07 PROCEDURE — 87491 CHLMYD TRACH DNA AMP PROBE: CPT | Mod: PARLAB | Performed by: STUDENT IN AN ORGANIZED HEALTH CARE EDUCATION/TRAINING PROGRAM

## 2024-07-07 PROCEDURE — 81003 URINALYSIS AUTO W/O SCOPE: CPT | Performed by: STUDENT IN AN ORGANIZED HEALTH CARE EDUCATION/TRAINING PROGRAM

## 2024-07-07 PROCEDURE — 96376 TX/PRO/DX INJ SAME DRUG ADON: CPT | Performed by: STUDENT IN AN ORGANIZED HEALTH CARE EDUCATION/TRAINING PROGRAM

## 2024-07-07 PROCEDURE — 2500000004 HC RX 250 GENERAL PHARMACY W/ HCPCS (ALT 636 FOR OP/ED): Performed by: EMERGENCY MEDICINE

## 2024-07-07 PROCEDURE — 96376 TX/PRO/DX INJ SAME DRUG ADON: CPT

## 2024-07-07 PROCEDURE — 87661 TRICHOMONAS VAGINALIS AMPLIF: CPT | Mod: PARLAB | Performed by: STUDENT IN AN ORGANIZED HEALTH CARE EDUCATION/TRAINING PROGRAM

## 2024-07-07 PROCEDURE — 99285 EMERGENCY DEPT VISIT HI MDM: CPT | Mod: 25 | Performed by: STUDENT IN AN ORGANIZED HEALTH CARE EDUCATION/TRAINING PROGRAM

## 2024-07-07 PROCEDURE — 85025 COMPLETE CBC W/AUTO DIFF WBC: CPT | Performed by: STUDENT IN AN ORGANIZED HEALTH CARE EDUCATION/TRAINING PROGRAM

## 2024-07-07 PROCEDURE — 96374 THER/PROPH/DIAG INJ IV PUSH: CPT | Mod: 59 | Performed by: STUDENT IN AN ORGANIZED HEALTH CARE EDUCATION/TRAINING PROGRAM

## 2024-07-07 PROCEDURE — 81025 URINE PREGNANCY TEST: CPT | Performed by: STUDENT IN AN ORGANIZED HEALTH CARE EDUCATION/TRAINING PROGRAM

## 2024-07-07 RX ORDER — HYDROMORPHONE HYDROCHLORIDE 1 MG/ML
1 INJECTION, SOLUTION INTRAMUSCULAR; INTRAVENOUS; SUBCUTANEOUS ONCE
Status: COMPLETED | OUTPATIENT
Start: 2024-07-07 | End: 2024-07-07

## 2024-07-07 RX ORDER — HYDROMORPHONE HYDROCHLORIDE 1 MG/ML
INJECTION, SOLUTION INTRAMUSCULAR; INTRAVENOUS; SUBCUTANEOUS
Status: COMPLETED
Start: 2024-07-07 | End: 2024-07-07

## 2024-07-07 RX ORDER — MIDAZOLAM HYDROCHLORIDE 5 MG/ML
5 INJECTION INTRAMUSCULAR; INTRAVENOUS ONCE
Status: COMPLETED | OUTPATIENT
Start: 2024-07-07 | End: 2024-07-07

## 2024-07-07 RX ORDER — MORPHINE SULFATE 4 MG/ML
4 INJECTION, SOLUTION INTRAMUSCULAR; INTRAVENOUS ONCE
Status: DISCONTINUED | OUTPATIENT
Start: 2024-07-07 | End: 2024-07-07

## 2024-07-07 RX ORDER — MIDAZOLAM HYDROCHLORIDE 5 MG/ML
INJECTION INTRAMUSCULAR; INTRAVENOUS
Status: COMPLETED
Start: 2024-07-07 | End: 2024-07-07

## 2024-07-07 RX ORDER — ONDANSETRON HYDROCHLORIDE 2 MG/ML
4 INJECTION, SOLUTION INTRAVENOUS ONCE
Status: COMPLETED | OUTPATIENT
Start: 2024-07-07 | End: 2024-07-07

## 2024-07-07 ASSESSMENT — COLUMBIA-SUICIDE SEVERITY RATING SCALE - C-SSRS
2. HAVE YOU ACTUALLY HAD ANY THOUGHTS OF KILLING YOURSELF?: NO
2. HAVE YOU ACTUALLY HAD ANY THOUGHTS OF KILLING YOURSELF?: NO
6. HAVE YOU EVER DONE ANYTHING, STARTED TO DO ANYTHING, OR PREPARED TO DO ANYTHING TO END YOUR LIFE?: NO
6. HAVE YOU EVER DONE ANYTHING, STARTED TO DO ANYTHING, OR PREPARED TO DO ANYTHING TO END YOUR LIFE?: NO
1. IN THE PAST MONTH, HAVE YOU WISHED YOU WERE DEAD OR WISHED YOU COULD GO TO SLEEP AND NOT WAKE UP?: NO
1. IN THE PAST MONTH, HAVE YOU WISHED YOU WERE DEAD OR WISHED YOU COULD GO TO SLEEP AND NOT WAKE UP?: NO

## 2024-07-07 ASSESSMENT — PAIN SCALES - GENERAL
PAINLEVEL_OUTOF10: 7
PAINLEVEL_OUTOF10: 10 - WORST POSSIBLE PAIN
PAINLEVEL_OUTOF10: 10 - WORST POSSIBLE PAIN

## 2024-07-07 ASSESSMENT — PAIN - FUNCTIONAL ASSESSMENT
PAIN_FUNCTIONAL_ASSESSMENT: 0-10
PAIN_FUNCTIONAL_ASSESSMENT: 0-10

## 2024-07-07 ASSESSMENT — PAIN DESCRIPTION - PAIN TYPE
TYPE: ACUTE PAIN
TYPE: ACUTE PAIN

## 2024-07-07 ASSESSMENT — PAIN DESCRIPTION - LOCATION
LOCATION: ABDOMEN
LOCATION: PELVIS

## 2024-07-07 ASSESSMENT — PAIN DESCRIPTION - ORIENTATION: ORIENTATION: LOWER

## 2024-07-07 ASSESSMENT — PAIN DESCRIPTION - DESCRIPTORS: DESCRIPTORS: CRAMPING;STABBING

## 2024-07-07 NOTE — ED TRIAGE NOTES
Patient transferred from [place] ER for worsening lower abdominal pain. Had surgery on 6/26 for D&C and IUD placement. Oral medications are not touching her pain. Denies vaginal bleeding. Denies GI symptoms.

## 2024-07-07 NOTE — ED PROVIDER NOTES
HPI   Chief Complaint   Patient presents with    Abdominal Pain     Lower uterine pain       This is a 44-year-old  female who presents to the emergency department complaining of severe pelvic cramping.  The patient states that she had a procedure on .  She had a D&C, hysteroscopy and IUD placement.  She reports that since that time she has had severe cramping.  This cramping is gotten worse.  She initially had light spotting for the first 4 days but this has resolved.  The pain is abdominal and worse on the right.  She was seen 2 days ago and had an ultrasound which showed the IUD in good position.  She was sent home with pain medicine.  Despite the pain medicine, she has had continued pain.  She went back to the emergency department this morning.  She had a CT scan performed and was transferred to [facility] as this is where she had her procedure performed.    Past medical history: Lupus, abnormal uterine bleeding, lupus nephritis, Mediport placement                          Princess Coma Scale Score: 15                     Patient History   Past Medical History:   Diagnosis Date    Abnormal uterine bleeding (AUB)     Acute on chronic systolic CHF (congestive heart failure) (Multi)     last saw cardiology  - appt needed, pt working on making appt.  Currently on cancellation list - scheduled for  (after surgery) pt aware needs cardiology appt prior to procedure    Acute pericarditis ([facility])     admitted 3/2024, resolved per pulm note.    Adverse effect of anesthesia     ashtma attack when waking up    Alopecia areata     Anxiety     Asthma ([facility])     Chronic headache     Chronic ITP (idiopathic thrombocytopenic purpura) (Multi)     s/p splenectomy.  4.30.24 - plt 407    Chronic respiratory failure (Multi)     on 2-3L baseline    Chronic sinusitis     Cushing's disease (Multi)     Depression     Endometriosis     Eosinophilia     Fibromyalgia     Gastritis     Hypertension      Hypokalemia     4.30.24: K 3.4 - oral supplement    Lumbar spondylosis     Lupus nephritis (Multi)     GFR 79, BUN 26, creat 0.93    PAH (pulmonary artery hypertension) (Multi)     follows with Dr. Aponte at [facility] - sotatercept, Winrevair NYHA class 3    Personal history of pulmonary embolism      - while on oral birth control    Raynaud disease     RLS (restless legs syndrome)     SLE (systemic lupus erythematosus) (Multi)     Sleep apnea     CPAP with O2 bleed in    TIA (transient ischemic attack)     15 years ago per pt, residual memory issues     Past Surgical History:   Procedure Laterality Date    CERVICAL CONIZATION   W/ LASER      x 3    DILATION AND CURETTAGE OF UTERUS      HYSTEROSCOPY      NASAL SEPTUM SURGERY      OTHER SURGICAL HISTORY      Laparoscopy/hysteroscopy    OTHER SURGICAL HISTORY  2018    Surgically induced     OTHER SURGICAL HISTORY      RFA - back    SPLENECTOMY, TOTAL  2018    Splenectomy     Family History   Problem Relation Name Age of Onset    Heart failure Father      Stroke Father       Social History     Tobacco Use    Smoking status: Never    Smokeless tobacco: Never   Vaping Use    Vaping status: Never Used   Substance Use Topics    Alcohol use: Not Currently     Comment: rarely    Drug use: Never       Physical Exam   ED Triage Vitals [24 1121]   Temperature Heart Rate Respirations BP   36.8 °C (98.3 °F) 69 19 (!) 136/92      Pulse Ox Temp Source Heart Rate Source Patient Position   98 % Oral Monitor Lying      BP Location FiO2 (%)     Right arm --       Physical Exam  Vitals and nursing note reviewed.   HENT:      Head: Normocephalic and atraumatic.      Nose: Nose normal.   Eyes:      Conjunctiva/sclera: Conjunctivae normal.   Cardiovascular:      Rate and Rhythm: Normal rate and regular rhythm.      Pulses: Normal pulses.      Heart sounds: Normal heart sounds.   Pulmonary:      Effort: Pulmonary effort is normal.      Breath sounds: Normal breath  sounds.   Abdominal:      General: Bowel sounds are normal.      Palpations: Abdomen is soft.      Tenderness: There is abdominal tenderness in the right lower quadrant and suprapubic area.   Musculoskeletal:         General: Normal range of motion.      Cervical back: Normal range of motion and neck supple.   Skin:     Findings: No rash.   Neurological:      General: No focal deficit present.      Mental Status: She is alert and oriented to person, place, and time.   Psychiatric:         Mood and Affect: Mood normal.         ED Course & MDM   Diagnoses as of 07/07/24 7436   Pelvic pain   Encounter for IUD removal       Medical Decision Making  Differential diagnosis: I have considered the following conditions during my assessment of this patient's condition: Gastritis, gastroenteritis, GERD, food poisoning, ileus, bowel obstruction, dislodged IUD, uterine perforation, hernia, acute appendicitis, cholecystitis, diverticulitis, colitis, renal colic.    This is a 44-year-old female who presents to the emergency department for OB/GYN evaluation and transfer from [facility].  The patient's labs were unremarkable.  Recent ultrasound and CT from today were reviewed.  These showed no abnormalities.  Case discussed with Dr. Shook.  He recommended removal of the IUD in the emergency department.  This was discussed with the patient.  She was very anxious about the procedure and pain.  Therefore, Versed was ordered.  Pelvic exam was performed with the nurse chaperone.  The string was readily visualized.  The string was pulled with Sammie forceps.  The IUD easily removed.  The IUD was intact with some mucus on the end of it.  The patient was appropriate for discharge and outpatient follow-up with Dr. Marte.        Procedure  Procedures     Baljeet Guthrie MD  07/07/24 3886

## 2024-07-07 NOTE — ED PROVIDER NOTES
HPI   Chief Complaint   Patient presents with    Abdominal Pain     43 y/o female c/o lower abdominal pain. Patient states she was seen here 7/5/24 with same complaint but pain is worse and she states pain from ultrasound.       44-year-old lady status post D&C, hysteroscopy, biopsy, and IUD placement for abnormal uterine bleeding on 6/26/2024 with gynecologist at [facility] presents with continued pelvic cramping.  Was actually seen in the emergency department yesterday, where she was discharged after IUD was confirmed on ultrasound imaging and laboratory diagnostics were reassuring.  Represents the emergency department for continued pain.  Seems to be intermittent in nature.  Denies associated nausea, vomiting, chest pain, and shortness of breath.  Still having her normal bowel movements.  Denies vaginal bleeding or vaginal discharge.  Denies hematuria, and dysuria.  States that pain medication at home is not controlling her symptoms.      History provided by:  Patient   used: No                        Mentor Coma Scale Score: 15                     Patient History   Past Medical History:   Diagnosis Date    Abnormal uterine bleeding (AUB)     Acute on chronic systolic CHF (congestive heart failure) (Multi)     last saw cardiology 2020 - appt needed, pt working on making appt.  Currently on cancellation list - scheduled for 8/7 (after surgery) pt aware needs cardiology appt prior to procedure    Acute pericarditis ([facility])     admitted 3/2024, resolved per pulm note.    Adverse effect of anesthesia     ashtma attack when waking up    Alopecia areata     Anxiety     Asthma ([facility])     Chronic headache     Chronic ITP (idiopathic thrombocytopenic purpura) (Multi)     s/p splenectomy.  4.30.24 - plt 407    Chronic respiratory failure (Multi)     on 2-3L baseline    Chronic sinusitis     Cushing's disease (Multi)     Depression     Endometriosis     Eosinophilia     Fibromyalgia     Gastritis      Hypertension     Hypokalemia     4.30.24: K 3.4 - oral supplement    Lumbar spondylosis     Lupus nephritis (Multi)     GFR 79, BUN 26, creat 0.93    PAH (pulmonary artery hypertension) (Multi)     follows with Dr. Aponte at [facility] - sotatercept, Winrevair NYHA class 3    Personal history of pulmonary embolism      - while on oral birth control    Raynaud disease     RLS (restless legs syndrome)     SLE (systemic lupus erythematosus) (Multi)     Sleep apnea     CPAP with O2 bleed in    TIA (transient ischemic attack)     15 years ago per pt, residual memory issues     Past Surgical History:   Procedure Laterality Date    CERVICAL CONIZATION   W/ LASER      x 3    DILATION AND CURETTAGE OF UTERUS      HYSTEROSCOPY      NASAL SEPTUM SURGERY      OTHER SURGICAL HISTORY      Laparoscopy/hysteroscopy    OTHER SURGICAL HISTORY  2018    Surgically induced     OTHER SURGICAL HISTORY      RFA - back    SPLENECTOMY, TOTAL  2018    Splenectomy     Family History   Problem Relation Name Age of Onset    Heart failure Father      Stroke Father       Social History     Tobacco Use    Smoking status: Never    Smokeless tobacco: Never   Vaping Use    Vaping status: Never Used   Substance Use Topics    Alcohol use: Not Currently     Comment: rarely    Drug use: Never       Physical Exam   ED Triage Vitals [24 0022]   Temperature Heart Rate Respirations BP   36.8 °C (98.2 °F) 70 18 163/57      Pulse Ox Temp Source Heart Rate Source Patient Position   95 % Temporal Monitor --      BP Location FiO2 (%)     Left arm --       Physical Exam  Vitals and nursing note reviewed.   HENT:      Head: Atraumatic.      Mouth/Throat:      Mouth: Mucous membranes are moist.   Eyes:      Conjunctiva/sclera: Conjunctivae normal.   Cardiovascular:      Rate and Rhythm: Normal rate and regular rhythm.   Pulmonary:      Effort: Pulmonary effort is normal.   Abdominal:      Palpations: Abdomen is soft.      Tenderness: There is  no abdominal tenderness. There is no right CVA tenderness or left CVA tenderness.   Musculoskeletal:         General: No deformity.      Cervical back: Normal range of motion.   Skin:     General: Skin is warm and dry.   Neurological:      Mental Status: She is alert.      Comments: Moving all extremities         ED Course & [facility]   ED Course as of 07/07/24 0628   Sun Jul 07, 2024   0216 Pelvic exam performed.  IUD strings identified.  Patient does have scant whitish discharge noted.  She does note a history of yeast infections.  Will swab for BV as well as yeast infection. [AB]   0257 Patient complaining of recurrent pain.  Will give additional dose of IV pain medication.  Will obtain CT imaging to further evaluate.  Abdomen remains soft without guarding or rebound. [AB]   0557 Spoke with Dr. Shook with Gyn -- will see in ER as consult at [facility]. [AB]      ED Course User Index  [AB] Hossein Ervin MD         Diagnoses as of 07/07/24 0628   Pelvic pain       Medical Decision Making  44-year-old lady status post D&C, hysteroscopy, biopsy, and IUD placement for abnormal uterine bleeding on 6/26/2024 with gynecologist at [facility] presents with continued pelvic cramping.  She is nontoxic-appearing on exam.  She is hemodynamically stable.  On pelvic exam, the IUD is in place and she does not have cervical motion tenderness.  She did have some whitish discharge, but follow-up swabs without evidence of BV or yeast infection.  Given her continued pain despite IV Dilaudid, did expand workup to include CT imaging, which was without acute findings.  As the patient continues to have pelvic pain and this is a repeat visit to the emergency department, I do think that she needs to be admitted for pain control at this time.  However, I think that she needs to be admitted to an institution with gynecology.  Given this, spoke with gynecology via the transfer center.  Gynecologist at [facility] would like to evaluate the patient in the  emergency department and does not accept for admission to [facility].  Patient agrees with transfer.    Amount and/or Complexity of Data Reviewed  External Data Reviewed: notes.     Details: Most recent op note  Labs: ordered.  Radiology: ordered.  Discussion of management or test interpretation with external provider(s): Gynecologist as well as emergency medicine physician at [facility]        Procedure  Procedures     Hossein Ervin MD  07/07/24 0622

## 2024-07-08 ENCOUNTER — TELEPHONE (OUTPATIENT)
Dept: OBSTETRICS AND GYNECOLOGY | Facility: CLINIC | Age: 44
End: 2024-07-08
Payer: COMMERCIAL

## 2024-07-09 NOTE — TELEPHONE ENCOUNTER
Returned patients phone call. She was just Wakeling up TC 9:35 AM . She would call me after she wakes up .

## 2024-07-10 ENCOUNTER — TELEPHONE (OUTPATIENT)
Dept: OBSTETRICS AND GYNECOLOGY | Facility: CLINIC | Age: 44
End: 2024-07-10
Payer: COMMERCIAL

## 2024-07-10 NOTE — TELEPHONE ENCOUNTER
Returned pts call [facility] . Informed her to look in her chart for message from  and Nurse Martha.  She is to make appointment to come into the office , per .

## 2024-07-12 ENCOUNTER — OFFICE VISIT (OUTPATIENT)
Dept: OBSTETRICS AND GYNECOLOGY | Facility: CLINIC | Age: 44
End: 2024-07-12
Payer: COMMERCIAL

## 2024-07-12 VITALS
SYSTOLIC BLOOD PRESSURE: 128 MMHG | WEIGHT: 240 LBS | HEIGHT: 62 IN | BODY MASS INDEX: 44.16 KG/M2 | DIASTOLIC BLOOD PRESSURE: 74 MMHG

## 2024-07-12 DIAGNOSIS — Z09 POSTOPERATIVE EXAMINATION: Primary | ICD-10-CM

## 2024-07-12 DIAGNOSIS — N92.1 MENORRHAGIA WITH IRREGULAR CYCLE: ICD-10-CM

## 2024-07-12 DIAGNOSIS — N94.6 DYSMENORRHEA: ICD-10-CM

## 2024-07-12 PROCEDURE — 3074F SYST BP LT 130 MM HG: CPT | Performed by: OBSTETRICS & GYNECOLOGY

## 2024-07-12 PROCEDURE — 99213 OFFICE O/P EST LOW 20 MIN: CPT | Performed by: OBSTETRICS & GYNECOLOGY

## 2024-07-12 PROCEDURE — 1036F TOBACCO NON-USER: CPT | Performed by: OBSTETRICS & GYNECOLOGY

## 2024-07-12 PROCEDURE — 3078F DIAST BP <80 MM HG: CPT | Performed by: OBSTETRICS & GYNECOLOGY

## 2024-07-12 RX ORDER — NORETHINDRONE 5 MG/1
5 TABLET ORAL DAILY
Qty: 30 TABLET | Refills: 11 | Status: SHIPPED | OUTPATIENT
Start: 2024-07-12 | End: 2025-07-12

## 2024-07-12 ASSESSMENT — ENCOUNTER SYMPTOMS
CARDIOVASCULAR NEGATIVE: 0
HEMATOLOGIC/LYMPHATIC NEGATIVE: 0
RESPIRATORY NEGATIVE: 0
EYES NEGATIVE: 0
MUSCULOSKELETAL NEGATIVE: 0
CONSTITUTIONAL NEGATIVE: 0
GASTROINTESTINAL NEGATIVE: 0
ENDOCRINE NEGATIVE: 0
PSYCHIATRIC NEGATIVE: 0
ALLERGIC/IMMUNOLOGIC NEGATIVE: 0
NEUROLOGICAL NEGATIVE: 0

## 2024-07-12 ASSESSMENT — PATIENT HEALTH QUESTIONNAIRE - PHQ9
SUM OF ALL RESPONSES TO PHQ9 QUESTIONS 1 AND 2: 0
2. FEELING DOWN, DEPRESSED OR HOPELESS: NOT AT ALL
1. LITTLE INTEREST OR PLEASURE IN DOING THINGS: NOT AT ALL

## 2024-07-12 ASSESSMENT — PAIN SCALES - GENERAL: PAINLEVEL: 3

## 2024-07-12 NOTE — PROGRESS NOTES
Subjective   Patient ID: Emilee Dempsey is a 44 y.o. female who presents for Follow-up (Last pap 11/12/18 wnl. HPV-. ).  Here for follow-up from diagnostic hysteroscopy D&C from June 26.  Patient notes within the half hour the surgery developed acute severe pain 10 out of 10 sharp stabbing lower pelvis.  She does note that she has had recurrent episodes of pain and has chronic pain issues as well.  Patient notes pain usually is during her cycles and extremely severe.  This pain appears sharper than her usual pain which is also 10 out of 10.  No nausea vomiting fever chills no GI or  complaints.  Patient was seen in the emergency room multiple times several hospitals.  Chart extensively reviewed along with multiple previous imaging    Review of Systems  Review of systems is unchanged at this point.  Has history of chronic pelvic pain symptoms related to chronic renal issues    Objective   Physical Exam  Vitals reviewed.   Constitutional:       Appearance: Normal appearance. She is obese.   Cardiovascular:      Rate and Rhythm: Normal rate and regular rhythm.   Pulmonary:      Effort: Pulmonary effort is normal.      Breath sounds: Normal breath sounds.   Abdominal:      General: Bowel sounds are normal. There is no distension.      Palpations: Abdomen is soft.      Tenderness: There is no abdominal tenderness. There is no guarding.   Genitourinary:     Comments: External genitalia unremarkable  Vagina clear  Cervix closed uterus normal anteverted  Adnexa without masses or tenderness  Perineum without lesions or swelling  Neurological:      General: No focal deficit present.      Mental Status: She is alert.   Psychiatric:         Mood and Affect: Mood normal.         Behavior: Behavior normal.         Thought Content: Thought content normal.         Judgment: Judgment normal.       Assessment/Plan   Diagnoses and all orders for this visit:  Postoperative examination     Reviewed findings with patient.  Current  pathology is negative.  Patient with a history of menorrhagia with regular cycles and severe dysmenorrhea.  Patient developed severe pain after the procedure with several emergency room visits over the week which culminated in removal of the IUD.  Patient notes that this did improve her pain.  Uncertain whether this was related to the surgery and IUD or the fact that she came under.  Discussed options with patient.  Does have a history of renal problems.  Still recommend starting on progestin in order to control her cycles and to alleviate the bleeding and the pain.  Patient is agreeable.  Will check with renal first.  Return to office in 6 months successful.  Will call if things are not improving    Magen Flower MD 07/12/24 12:00 PM

## 2024-07-16 ENCOUNTER — APPOINTMENT (OUTPATIENT)
Dept: OBSTETRICS AND GYNECOLOGY | Facility: CLINIC | Age: 44
End: 2024-07-16
Payer: COMMERCIAL

## 2024-12-03 ENCOUNTER — TELEPHONE (OUTPATIENT)
Dept: OBSTETRICS AND GYNECOLOGY | Facility: CLINIC | Age: 44
End: 2024-12-03
Payer: COMMERCIAL

## 2024-12-10 ENCOUNTER — OFFICE VISIT (OUTPATIENT)
Dept: OBSTETRICS AND GYNECOLOGY | Facility: CLINIC | Age: 44
End: 2024-12-10
Payer: COMMERCIAL

## 2024-12-10 VITALS
WEIGHT: 244 LBS | BODY MASS INDEX: 46.07 KG/M2 | SYSTOLIC BLOOD PRESSURE: 155 MMHG | DIASTOLIC BLOOD PRESSURE: 68 MMHG | HEIGHT: 61 IN

## 2024-12-10 DIAGNOSIS — M31.19 TTP (THROMBOTIC THROMBOPENIC PURPURA) (MULTI): ICD-10-CM

## 2024-12-10 DIAGNOSIS — L93.0 LUPUS ERYTHEMATOSUS, UNSPECIFIED FORM: ICD-10-CM

## 2024-12-10 DIAGNOSIS — Z86.718 HISTORY OF DVT (DEEP VEIN THROMBOSIS): ICD-10-CM

## 2024-12-10 DIAGNOSIS — R10.2 PELVIC PAIN IN FEMALE: ICD-10-CM

## 2024-12-10 DIAGNOSIS — N80.9 ENDOMETRIOSIS: Primary | ICD-10-CM

## 2024-12-10 PROCEDURE — 99214 OFFICE O/P EST MOD 30 MIN: CPT | Performed by: OBSTETRICS & GYNECOLOGY

## 2024-12-10 PROCEDURE — 3008F BODY MASS INDEX DOCD: CPT | Performed by: OBSTETRICS & GYNECOLOGY

## 2024-12-10 PROCEDURE — 3077F SYST BP >= 140 MM HG: CPT | Performed by: OBSTETRICS & GYNECOLOGY

## 2024-12-10 PROCEDURE — 3078F DIAST BP <80 MM HG: CPT | Performed by: OBSTETRICS & GYNECOLOGY

## 2024-12-10 PROCEDURE — 1036F TOBACCO NON-USER: CPT | Performed by: OBSTETRICS & GYNECOLOGY

## 2024-12-10 ASSESSMENT — ENCOUNTER SYMPTOMS
CARDIOVASCULAR NEGATIVE: 0
MUSCULOSKELETAL NEGATIVE: 0
NEUROLOGICAL NEGATIVE: 0
ENDOCRINE NEGATIVE: 0
RESPIRATORY NEGATIVE: 0
PSYCHIATRIC NEGATIVE: 0
CONSTITUTIONAL NEGATIVE: 0
ALLERGIC/IMMUNOLOGIC NEGATIVE: 0
GASTROINTESTINAL NEGATIVE: 0
EYES NEGATIVE: 0
HEMATOLOGIC/LYMPHATIC NEGATIVE: 0

## 2024-12-10 ASSESSMENT — PAIN SCALES - GENERAL: PAINLEVEL_OUTOF10: 0-NO PAIN

## 2024-12-10 NOTE — PROGRESS NOTES
Subjective   Patient ID: Emilee Dempsey is a 44 y.o. female who presents for Follow-up (Blood clots in the left leg Last pap 11/12/18 wnl HPV negative no mammogram on file).  HPI  Here for consultation with mother.  Patient notes she has recently been admitted to the hospital for femoral and popliteal blood clot.  Patient has a prior history of blood clot as well.  Was on Aygestin for control of menses and endometriosis.  Patient here to discuss her options.  Does not wish to be on progestins anymore because she believes that they have caused her blood clots.  Discussed with patient and mother.  Progestins are not typically associated with blood clots although cannot absolutely guarantee that they have no bearing.  Patient does have a history of lupus and serious respiratory and cardiac issues which could also be contributing to her problem and are probably much more likely.  Also noted in chart that she did not take her blood thinners for 10 days prior to developing the blood clot and per [facility] they believe this was the cause is not her progestin.  Chart was extensively reviewed from [facility].  Long discussion with patient.  Options reviewed including restarting medication, observation, or surgery either ablation or hysterectomy.  Patient has multiple cardiac and pulmonary issues along with lupus.  In addition has a history of current blood clots.  May be a poor surgical risk.  Patient understands this and will consider her options.  Currently has not been sexually active for many years and has no intention of having intercourse.    Assessment/Plan   Diagnoses and all orders for this visit:  Endometriosis  Pelvic pain in female  History of DVT (deep vein thrombosis)  Lupus erythematosus, unspecified form  TTP (thrombotic thrombopenic purpura) (Multi)     Multiple medical issues.  Poor surgical candidate General.  Options reviewed for treatment.  Observation versus renewing medication continue  blood thinners, ablation or hysterectomy.  Patient has appointment in a few weeks.  Will revisit the question at that time.  At this point would at least wait due to recent blood clot before doing any surgical procedures.  Would also need medical clearance.  40 minutes total consultation including 10 minutes review of chart prior visit    Magen Flower MD 12/10/24 3:16 PM

## 2025-01-10 ENCOUNTER — OFFICE VISIT (OUTPATIENT)
Dept: OBSTETRICS AND GYNECOLOGY | Facility: CLINIC | Age: 45
End: 2025-01-10
Payer: COMMERCIAL

## 2025-01-10 VITALS
DIASTOLIC BLOOD PRESSURE: 60 MMHG | BODY MASS INDEX: 45.31 KG/M2 | SYSTOLIC BLOOD PRESSURE: 125 MMHG | WEIGHT: 240 LBS | HEIGHT: 61 IN

## 2025-01-10 DIAGNOSIS — M31.19 TTP (THROMBOTIC THROMBOPENIC PURPURA) (MULTI): ICD-10-CM

## 2025-01-10 DIAGNOSIS — R10.2 PELVIC PAIN IN FEMALE: ICD-10-CM

## 2025-01-10 DIAGNOSIS — E66.01 MORBID OBESITY (MULTI): ICD-10-CM

## 2025-01-10 DIAGNOSIS — N80.9 ENDOMETRIOSIS: Primary | ICD-10-CM

## 2025-01-10 DIAGNOSIS — Z86.711 HISTORY OF PULMONARY EMBOLISM: ICD-10-CM

## 2025-01-10 DIAGNOSIS — N92.1 MENORRHAGIA WITH IRREGULAR CYCLE: ICD-10-CM

## 2025-01-10 PROCEDURE — 99213 OFFICE O/P EST LOW 20 MIN: CPT | Performed by: OBSTETRICS & GYNECOLOGY

## 2025-01-10 PROCEDURE — 3078F DIAST BP <80 MM HG: CPT | Performed by: OBSTETRICS & GYNECOLOGY

## 2025-01-10 PROCEDURE — 3074F SYST BP LT 130 MM HG: CPT | Performed by: OBSTETRICS & GYNECOLOGY

## 2025-01-10 PROCEDURE — 3008F BODY MASS INDEX DOCD: CPT | Performed by: OBSTETRICS & GYNECOLOGY

## 2025-01-10 RX ORDER — TRIAMCINOLONE ACETONIDE 1 MG/G
OINTMENT TOPICAL 2 TIMES DAILY
COMMUNITY
Start: 2025-01-08 | End: 2025-01-15

## 2025-01-10 ASSESSMENT — ENCOUNTER SYMPTOMS
CONSTIPATION: 1
GASTROINTESTINAL NEGATIVE: 0
UNEXPECTED WEIGHT CHANGE: 1
CONSTITUTIONAL NEGATIVE: 0
ENDOCRINE NEGATIVE: 0
CHEST TIGHTNESS: 1
SHORTNESS OF BREATH: 1
HEMATOLOGIC/LYMPHATIC NEGATIVE: 0
ABDOMINAL DISTENTION: 1
CARDIOVASCULAR NEGATIVE: 0
ALLERGIC/IMMUNOLOGIC NEGATIVE: 0
DIZZINESS: 1
ABDOMINAL PAIN: 1
HEADACHES: 1
RESPIRATORY NEGATIVE: 0
PSYCHIATRIC NEGATIVE: 0
FATIGUE: 1
NEUROLOGICAL NEGATIVE: 0
BACK PAIN: 1
MUSCULOSKELETAL NEGATIVE: 0
EYES NEGATIVE: 0

## 2025-01-10 ASSESSMENT — PAIN SCALES - GENERAL: PAINLEVEL_OUTOF10: 5

## 2025-01-10 NOTE — PROGRESS NOTES
Subjective   Patient ID: Emilee Dempsey is a 44 y.o. female who presents for Follow-up (Last pap 11/12/18 wnl HPV negative no mammogram on file).  HPI  Here for follow-up.  Has history of chronic pelvic pain with endometriosis and menorrhagia.  Also has multiple other medical problems.  Currently is under treatment for history of blood clot x 2 approximately 2 months ago.  On Lovenox.  From a GYN standpoint is on Aygestin for control of menses and treatment of endometriosis.  Patient is here with her mother    Review of Systems   Constitutional:  Positive for fatigue and unexpected weight change.   Respiratory:  Positive for chest tightness and shortness of breath.    Gastrointestinal:  Positive for abdominal distention, abdominal pain and constipation.   Genitourinary:  Positive for dyspareunia, menstrual problem and pelvic pain.   Musculoskeletal:  Positive for back pain.   Neurological:  Positive for dizziness and headaches.       Objective   Physical Exam  Vitals reviewed.   Constitutional:       Appearance: Normal appearance. She is obese.   Cardiovascular:      Rate and Rhythm: Normal rate and regular rhythm.   Pulmonary:      Effort: Pulmonary effort is normal.      Breath sounds: Normal breath sounds.   Abdominal:      General: Bowel sounds are normal. There is no distension.      Palpations: Abdomen is soft. There is no mass.      Tenderness: There is no abdominal tenderness.   Musculoskeletal:      Cervical back: Normal range of motion and neck supple.   Neurological:      General: No focal deficit present.      Mental Status: She is alert.   Psychiatric:         Mood and Affect: Mood normal.         Behavior: Behavior normal.         Thought Content: Thought content normal.         Judgment: Judgment normal.     Assessment/Plan   Diagnoses and all orders for this visit:  Endometriosis  Menorrhagia with irregular cycle  TTP (thrombotic thrombopenic purpura) (Multi)  Morbid obesity (Multi)  Pelvic pain  in female  History of pulmonary embolism     Patient with multiple medical issues currently with history of menorrhagia with regular cycles and current chronic pelvic pain due to endometriosis.  Status post diagnostic hysteroscopy with IUD placement.  IUD was removed as patient could not tolerated.  Patient has significant medical issues including recent blood clots approximately 2 months ago.  Patient is on Aygestin at this point for treating her menorrhagia and her endometriosis.  Is can still complains some lower quadrant pain seems improved menses are much improved.  Discussed options with patient and mother.  Discussed ablation or hysterectomy.  Patient would like to get off her medications.  Believes it might be related to her blood clots.  Discussed again low risk of blood clotting suspected with Aygestin.  Would advise that she continue on progestin to control her cycles and manage her endometriosis.  Discussed risks and benefits of surgical approaches with patient.  Patient is high risk due to previous history of medical issues.  Patient will consider her options.  For now we will continue and return to office in 2 to 3 months.  Menstrual calendar    Magen Flower MD 01/10/25 12:03 PM

## 2025-01-14 ENCOUNTER — LAB REQUISITION (OUTPATIENT)
Dept: LAB | Facility: HOSPITAL | Age: 45
End: 2025-01-14
Payer: COMMERCIAL

## 2025-01-14 DIAGNOSIS — R78.81 BACTEREMIA: ICD-10-CM

## 2025-01-14 DIAGNOSIS — B95.62 METHICILLIN RESISTANT STAPHYLOCOCCUS AUREUS INFECTION AS THE CAUSE OF DISEASES CLASSIFIED ELSEWHERE: ICD-10-CM

## 2025-01-14 DIAGNOSIS — T82.7XXA INFECTION AND INFLAMMATORY REACTION DUE TO OTHER CARDIAC AND VASCULAR DEVICES, IMPLANTS AND GRAFTS, INITIAL ENCOUNTER: ICD-10-CM

## 2025-01-14 LAB
ALBUMIN SERPL BCP-MCNC: 3.5 G/DL (ref 3.4–5)
ANION GAP SERPL CALC-SCNC: 11 MMOL/L (ref 10–20)
BASOPHILS # BLD AUTO: 0.15 X10*3/UL (ref 0–0.1)
BASOPHILS NFR BLD AUTO: 1.3 %
BUN SERPL-MCNC: 16 MG/DL (ref 6–23)
CALCIUM SERPL-MCNC: 8.9 MG/DL (ref 8.6–10.3)
CHLORIDE SERPL-SCNC: 102 MMOL/L (ref 98–107)
CO2 SERPL-SCNC: 28 MMOL/L (ref 21–32)
CREAT SERPL-MCNC: 0.97 MG/DL (ref 0.5–1.05)
EGFRCR SERPLBLD CKD-EPI 2021: 75 ML/MIN/1.73M*2
EOSINOPHIL # BLD AUTO: 0.51 X10*3/UL (ref 0–0.7)
EOSINOPHIL NFR BLD AUTO: 4.3 %
ERYTHROCYTE [DISTWIDTH] IN BLOOD BY AUTOMATED COUNT: 16.2 % (ref 11.5–14.5)
GLUCOSE SERPL-MCNC: 108 MG/DL (ref 74–99)
HCT VFR BLD AUTO: 40.5 % (ref 36–46)
HGB BLD-MCNC: 13.2 G/DL (ref 12–16)
HOLD SPECIMEN: NORMAL
IMM GRANULOCYTES # BLD AUTO: 0.33 X10*3/UL (ref 0–0.7)
IMM GRANULOCYTES NFR BLD AUTO: 2.8 % (ref 0–0.9)
LYMPHOCYTES # BLD AUTO: 3.44 X10*3/UL (ref 1.2–4.8)
LYMPHOCYTES NFR BLD AUTO: 29.2 %
MCH RBC QN AUTO: 30.7 PG (ref 26–34)
MCHC RBC AUTO-ENTMCNC: 32.6 G/DL (ref 32–36)
MCV RBC AUTO: 94 FL (ref 80–100)
MONOCYTES # BLD AUTO: 1.09 X10*3/UL (ref 0.1–1)
MONOCYTES NFR BLD AUTO: 9.3 %
NEUTROPHILS # BLD AUTO: 6.26 X10*3/UL (ref 1.2–7.7)
NEUTROPHILS NFR BLD AUTO: 53.1 %
NRBC BLD-RTO: 3 /100 WBCS (ref 0–0)
PHOSPHATE SERPL-MCNC: 4 MG/DL (ref 2.5–4.9)
PLATELET # BLD AUTO: 513 X10*3/UL (ref 150–450)
POTASSIUM SERPL-SCNC: 3.4 MMOL/L (ref 3.5–5.3)
RBC # BLD AUTO: 4.3 X10*6/UL (ref 4–5.2)
SODIUM SERPL-SCNC: 138 MMOL/L (ref 136–145)
VANCOMYCIN TROUGH SERPL-MCNC: 7.3 UG/ML (ref 5–20)
WBC # BLD AUTO: 11.8 X10*3/UL (ref 4.4–11.3)

## 2025-01-14 PROCEDURE — 80202 ASSAY OF VANCOMYCIN: CPT | Mod: OUT | Performed by: INTERNAL MEDICINE

## 2025-01-14 PROCEDURE — 85025 COMPLETE CBC W/AUTO DIFF WBC: CPT | Mod: OUT | Performed by: INTERNAL MEDICINE

## 2025-01-14 PROCEDURE — 80069 RENAL FUNCTION PANEL: CPT | Mod: OUT | Performed by: INTERNAL MEDICINE

## 2025-03-11 ENCOUNTER — APPOINTMENT (OUTPATIENT)
Dept: OBSTETRICS AND GYNECOLOGY | Facility: CLINIC | Age: 45
End: 2025-03-11
Payer: COMMERCIAL

## 2025-03-18 ENCOUNTER — APPOINTMENT (OUTPATIENT)
Dept: OBSTETRICS AND GYNECOLOGY | Facility: CLINIC | Age: 45
End: 2025-03-18
Payer: COMMERCIAL

## 2025-04-04 ENCOUNTER — APPOINTMENT (OUTPATIENT)
Dept: OBSTETRICS AND GYNECOLOGY | Facility: CLINIC | Age: 45
End: 2025-04-04
Payer: COMMERCIAL

## 2025-04-21 ENCOUNTER — APPOINTMENT (OUTPATIENT)
Dept: OBSTETRICS AND GYNECOLOGY | Facility: CLINIC | Age: 45
End: 2025-04-21
Payer: COMMERCIAL

## 2025-08-09 ENCOUNTER — HOSPITAL ENCOUNTER (INPATIENT)
Facility: HOSPITAL | Age: 45
End: 2025-08-09
Admitting: INTERNAL MEDICINE
Payer: COMMERCIAL

## 2025-08-09 ENCOUNTER — APPOINTMENT (OUTPATIENT)
Dept: RADIOLOGY | Facility: HOSPITAL | Age: 45
End: 2025-08-09
Payer: COMMERCIAL

## 2025-08-09 ENCOUNTER — APPOINTMENT (OUTPATIENT)
Dept: CARDIOLOGY | Facility: HOSPITAL | Age: 45
End: 2025-08-09
Payer: COMMERCIAL

## 2025-08-09 DIAGNOSIS — I39 ENDOCARDITIS AND HEART VALVE DISORDERS IN DISEASES CLASSIFIED ELSEWHERE: ICD-10-CM

## 2025-08-09 DIAGNOSIS — R41.82 ALTERED MENTAL STATUS, UNSPECIFIED ALTERED MENTAL STATUS TYPE: Primary | ICD-10-CM

## 2025-08-09 DIAGNOSIS — R65.20 SEPSIS WITH ACUTE RENAL FAILURE, DUE TO UNSPECIFIED ORGANISM, UNSPECIFIED ACUTE RENAL FAILURE TYPE, UNSPECIFIED WHETHER SEPTIC SHOCK PRESENT (MULTI): ICD-10-CM

## 2025-08-09 DIAGNOSIS — J96.01 ACUTE HYPOXIC RESPIRATORY FAILURE: ICD-10-CM

## 2025-08-09 DIAGNOSIS — I21.4 NSTEMI (NON-ST ELEVATED MYOCARDIAL INFARCTION) (MULTI): ICD-10-CM

## 2025-08-09 DIAGNOSIS — F51.01 PRIMARY INSOMNIA: ICD-10-CM

## 2025-08-09 DIAGNOSIS — R53.81 PHYSICAL DECONDITIONING: ICD-10-CM

## 2025-08-09 DIAGNOSIS — K58.0 IRRITABLE BOWEL SYNDROME WITH DIARRHEA: ICD-10-CM

## 2025-08-09 DIAGNOSIS — A41.9 SEPSIS WITH ACUTE RENAL FAILURE, DUE TO UNSPECIFIED ORGANISM, UNSPECIFIED ACUTE RENAL FAILURE TYPE, UNSPECIFIED WHETHER SEPTIC SHOCK PRESENT (MULTI): ICD-10-CM

## 2025-08-09 DIAGNOSIS — N17.9 AKI (ACUTE KIDNEY INJURY): ICD-10-CM

## 2025-08-09 DIAGNOSIS — G89.4 CHRONIC PAIN SYNDROME: ICD-10-CM

## 2025-08-09 DIAGNOSIS — N17.9 SEPSIS WITH ACUTE RENAL FAILURE, DUE TO UNSPECIFIED ORGANISM, UNSPECIFIED ACUTE RENAL FAILURE TYPE, UNSPECIFIED WHETHER SEPTIC SHOCK PRESENT (MULTI): ICD-10-CM

## 2025-08-09 LAB
ALBUMIN SERPL BCP-MCNC: 4.5 G/DL (ref 3.4–5)
ALP SERPL-CCNC: 116 U/L (ref 33–110)
ALT SERPL W P-5'-P-CCNC: 231 U/L (ref 7–45)
AMPHETAMINES UR QL SCN: ABNORMAL
ANION GAP BLDA CALCULATED.4IONS-SCNC: 11 MMO/L (ref 10–25)
ANION GAP BLDV CALCULATED.4IONS-SCNC: 16 MMOL/L (ref 10–25)
ANION GAP SERPL CALC-SCNC: 21 MMOL/L (ref 10–20)
APAP SERPL-MCNC: <10 UG/ML (ref ?–30)
APPARATUS: ABNORMAL
APPEARANCE UR: CLEAR
APTT PPP: 31 SECONDS (ref 26–36)
ARTERIAL PATENCY WRIST A: POSITIVE
AST SERPL W P-5'-P-CCNC: 303 U/L (ref 9–39)
BARBITURATES UR QL SCN: ABNORMAL
BASE EXCESS BLDA CALC-SCNC: -2.5 MMOL/L (ref -2–3)
BASE EXCESS BLDV CALC-SCNC: -5 MMOL/L (ref -2–3)
BASOPHILS # BLD AUTO: 0.07 X10*3/UL (ref 0–0.1)
BASOPHILS NFR BLD AUTO: 0.4 %
BENZODIAZ UR QL SCN: ABNORMAL
BILIRUB SERPL-MCNC: 0.4 MG/DL (ref 0–1.2)
BILIRUB UR STRIP.AUTO-MCNC: NEGATIVE MG/DL
BODY TEMPERATURE: 37 DEGREES CELSIUS
BODY TEMPERATURE: 37 DEGREES CELSIUS
BUN SERPL-MCNC: 20 MG/DL (ref 6–23)
BZE UR QL SCN: ABNORMAL
CA-I BLDA-SCNC: 1.04 MMOL/L (ref 1.1–1.33)
CA-I BLDV-SCNC: 1 MMOL/L (ref 1.1–1.33)
CALCIUM SERPL-MCNC: 8.8 MG/DL (ref 8.6–10.3)
CANNABINOIDS UR QL SCN: ABNORMAL
CARDIAC TROPONIN I PNL SERPL HS: 581 NG/L (ref 0–13)
CARDIAC TROPONIN I PNL SERPL HS: 723 NG/L (ref 0–13)
CHLORIDE BLDA-SCNC: 99 MMOL/L (ref 98–107)
CHLORIDE BLDV-SCNC: 93 MMOL/L (ref 98–107)
CHLORIDE SERPL-SCNC: 95 MMOL/L (ref 98–107)
CK SERPL-CCNC: ABNORMAL U/L (ref 0–215)
CO2 SERPL-SCNC: 23 MMOL/L (ref 21–32)
COLOR UR: ABNORMAL
CREAT SERPL-MCNC: 2.51 MG/DL (ref 0.5–1.05)
CRITICAL CALL TIME: 1608
CRITICAL CALLED BY: ABNORMAL
CRITICAL CALLED TO: ABNORMAL
CRITICAL READ BACK: ABNORMAL
EGFRCR SERPLBLD CKD-EPI 2021: 24 ML/MIN/1.73M*2
EOSINOPHIL # BLD AUTO: 0.01 X10*3/UL (ref 0–0.7)
EOSINOPHIL NFR BLD AUTO: 0.1 %
ERYTHROCYTE [DISTWIDTH] IN BLOOD BY AUTOMATED COUNT: 21.1 % (ref 11.5–14.5)
ETHANOL SERPL-MCNC: <10 MG/DL
FENTANYL+NORFENTANYL UR QL SCN: ABNORMAL
FLOW: 50 LPM
FLUAV RNA RESP QL NAA+PROBE: NOT DETECTED
FLUBV RNA RESP QL NAA+PROBE: NOT DETECTED
GLUCOSE BLD MANUAL STRIP-MCNC: 131 MG/DL (ref 74–99)
GLUCOSE BLD MANUAL STRIP-MCNC: 148 MG/DL (ref 74–99)
GLUCOSE BLD MANUAL STRIP-MCNC: 56 MG/DL (ref 74–99)
GLUCOSE BLDA-MCNC: 121 MG/DL (ref 74–99)
GLUCOSE BLDV-MCNC: 134 MG/DL (ref 74–99)
GLUCOSE SERPL-MCNC: 102 MG/DL (ref 74–99)
GLUCOSE UR STRIP.AUTO-MCNC: NORMAL MG/DL
GRAN CASTS #/AREA UR COMP ASSIST: ABNORMAL /LPF
HCO3 BLDA-SCNC: 25.7 MMOL/L (ref 22–26)
HCO3 BLDV-SCNC: 24.1 MMOL/L (ref 22–26)
HCT VFR BLD AUTO: 47.8 % (ref 36–46)
HCT VFR BLD EST: 52 % (ref 36–46)
HCT VFR BLD EST: 53 % (ref 36–46)
HGB BLD-MCNC: 14.3 G/DL (ref 12–16)
HGB BLDA-MCNC: 17.3 G/DL (ref 12–16)
HGB BLDV-MCNC: 17.8 G/DL (ref 12–16)
HYALINE CASTS #/AREA URNS AUTO: ABNORMAL /LPF
IMM GRANULOCYTES # BLD AUTO: 0.57 X10*3/UL (ref 0–0.7)
IMM GRANULOCYTES NFR BLD AUTO: 2.9 % (ref 0–0.9)
INHALED O2 CONCENTRATION: 45 %
INHALED O2 CONCENTRATION: 50 %
KETONES UR STRIP.AUTO-MCNC: NEGATIVE MG/DL
LACTATE BLDA-SCNC: 1.3 MMOL/L (ref 0.4–2)
LACTATE BLDV-SCNC: 5.5 MMOL/L (ref 0.4–2)
LACTATE SERPL-SCNC: 4.5 MMOL/L (ref 0.4–2)
LACTATE SERPL-SCNC: 4.7 MMOL/L (ref 0.4–2)
LEUKOCYTE ESTERASE UR QL STRIP.AUTO: NEGATIVE
LIPASE SERPL-CCNC: 23 U/L (ref 9–82)
LYMPHOCYTES # BLD AUTO: 0.59 X10*3/UL (ref 1.2–4.8)
LYMPHOCYTES NFR BLD AUTO: 3 %
MCH RBC QN AUTO: 28 PG (ref 26–34)
MCHC RBC AUTO-ENTMCNC: 29.9 G/DL (ref 32–36)
MCV RBC AUTO: 94 FL (ref 80–100)
METHADONE UR QL SCN: ABNORMAL
MONOCYTES # BLD AUTO: 1.21 X10*3/UL (ref 0.1–1)
MONOCYTES NFR BLD AUTO: 6.2 %
MUCOUS THREADS #/AREA URNS AUTO: ABNORMAL /LPF
NEUTROPHILS # BLD AUTO: 17.1 X10*3/UL (ref 1.2–7.7)
NEUTROPHILS NFR BLD AUTO: 87.4 %
NITRITE UR QL STRIP.AUTO: NEGATIVE
NRBC BLD-RTO: 2.6 /100 WBCS (ref 0–0)
OPIATES UR QL SCN: ABNORMAL
OXYCODONE+OXYMORPHONE UR QL SCN: ABNORMAL
OXYHGB MFR BLDA: 95 % (ref 94–98)
OXYHGB MFR BLDV: 61.5 % (ref 45–75)
PCO2 BLDA: 56 MM HG (ref 38–42)
PCO2 BLDV: 59 MM HG (ref 41–51)
PCP UR QL SCN: ABNORMAL
PH BLDA: 7.27 PH (ref 7.38–7.42)
PH BLDV: 7.22 PH (ref 7.33–7.43)
PH UR STRIP.AUTO: 6 [PH]
PLATELET # BLD AUTO: 279 X10*3/UL (ref 150–450)
PO2 BLDA: 86 MM HG (ref 85–95)
PO2 BLDV: 39 MM HG (ref 35–45)
POLYCHROMASIA BLD QL SMEAR: NORMAL
POTASSIUM BLDA-SCNC: 4.6 MMOL/L (ref 3.5–5.3)
POTASSIUM BLDV-SCNC: 5.8 MMOL/L (ref 3.5–5.3)
POTASSIUM SERPL-SCNC: 5.3 MMOL/L (ref 3.5–5.3)
PREGNANCY TEST URINE, POC: NEGATIVE
PROT SERPL-MCNC: 8.7 G/DL (ref 6.4–8.2)
PROT UR STRIP.AUTO-MCNC: ABNORMAL MG/DL
RBC # BLD AUTO: 5.1 X10*6/UL (ref 4–5.2)
RBC # UR STRIP.AUTO: ABNORMAL MG/DL
RBC #/AREA URNS AUTO: ABNORMAL /HPF
RBC MORPH BLD: NORMAL
RSV RNA RESP QL NAA+PROBE: NOT DETECTED
SALICYLATES SERPL-MCNC: <3 MG/DL (ref ?–20)
SAO2 % BLDA: 97 % (ref 94–100)
SAO2 % BLDV: 63 % (ref 45–75)
SARS-COV-2 RNA RESP QL NAA+PROBE: NOT DETECTED
SODIUM BLDA-SCNC: 131 MMOL/L (ref 136–145)
SODIUM BLDV-SCNC: 127 MMOL/L (ref 136–145)
SODIUM SERPL-SCNC: 134 MMOL/L (ref 136–145)
SP GR UR STRIP.AUTO: 1.01
SPECIMEN DRAWN FROM PATIENT: ABNORMAL
TARGETS BLD QL SMEAR: NORMAL
UROBILINOGEN UR STRIP.AUTO-MCNC: NORMAL MG/DL
WBC # BLD AUTO: 19.6 X10*3/UL (ref 4.4–11.3)
WBC #/AREA URNS AUTO: ABNORMAL /HPF

## 2025-08-09 PROCEDURE — 51702 INSERT TEMP BLADDER CATH: CPT

## 2025-08-09 PROCEDURE — 83605 ASSAY OF LACTIC ACID: CPT

## 2025-08-09 PROCEDURE — 84132 ASSAY OF SERUM POTASSIUM: CPT | Performed by: INTERNAL MEDICINE

## 2025-08-09 PROCEDURE — 70498 CT ANGIOGRAPHY NECK: CPT

## 2025-08-09 PROCEDURE — 80320 DRUG SCREEN QUANTALCOHOLS: CPT

## 2025-08-09 PROCEDURE — 84132 ASSAY OF SERUM POTASSIUM: CPT

## 2025-08-09 PROCEDURE — 87637 SARSCOV2&INF A&B&RSV AMP PRB: CPT

## 2025-08-09 PROCEDURE — 2500000004 HC RX 250 GENERAL PHARMACY W/ HCPCS (ALT 636 FOR OP/ED): Performed by: INTERNAL MEDICINE

## 2025-08-09 PROCEDURE — 71275 CT ANGIOGRAPHY CHEST: CPT | Performed by: STUDENT IN AN ORGANIZED HEALTH CARE EDUCATION/TRAINING PROGRAM

## 2025-08-09 PROCEDURE — 70450 CT HEAD/BRAIN W/O DYE: CPT | Performed by: STUDENT IN AN ORGANIZED HEALTH CARE EDUCATION/TRAINING PROGRAM

## 2025-08-09 PROCEDURE — 71275 CT ANGIOGRAPHY CHEST: CPT

## 2025-08-09 PROCEDURE — 70498 CT ANGIOGRAPHY NECK: CPT | Performed by: RADIOLOGY

## 2025-08-09 PROCEDURE — 2550000001 HC RX 255 CONTRASTS

## 2025-08-09 PROCEDURE — 82550 ASSAY OF CK (CPK): CPT | Performed by: INTERNAL MEDICINE

## 2025-08-09 PROCEDURE — 81025 URINE PREGNANCY TEST: CPT

## 2025-08-09 PROCEDURE — 96375 TX/PRO/DX INJ NEW DRUG ADDON: CPT | Mod: 59

## 2025-08-09 PROCEDURE — 84484 ASSAY OF TROPONIN QUANT: CPT

## 2025-08-09 PROCEDURE — 81001 URINALYSIS AUTO W/SCOPE: CPT

## 2025-08-09 PROCEDURE — 71045 X-RAY EXAM CHEST 1 VIEW: CPT

## 2025-08-09 PROCEDURE — 96361 HYDRATE IV INFUSION ADD-ON: CPT | Mod: 59

## 2025-08-09 PROCEDURE — 85730 THROMBOPLASTIN TIME PARTIAL: CPT

## 2025-08-09 PROCEDURE — 2500000004 HC RX 250 GENERAL PHARMACY W/ HCPCS (ALT 636 FOR OP/ED)

## 2025-08-09 PROCEDURE — 96365 THER/PROPH/DIAG IV INF INIT: CPT | Mod: 59

## 2025-08-09 PROCEDURE — 2500000005 HC RX 250 GENERAL PHARMACY W/O HCPCS: Performed by: INTERNAL MEDICINE

## 2025-08-09 PROCEDURE — 82947 ASSAY GLUCOSE BLOOD QUANT: CPT

## 2025-08-09 PROCEDURE — 99291 CRITICAL CARE FIRST HOUR: CPT

## 2025-08-09 PROCEDURE — 36415 COLL VENOUS BLD VENIPUNCTURE: CPT

## 2025-08-09 PROCEDURE — 99291 CRITICAL CARE FIRST HOUR: CPT | Performed by: STUDENT IN AN ORGANIZED HEALTH CARE EDUCATION/TRAINING PROGRAM

## 2025-08-09 PROCEDURE — 71045 X-RAY EXAM CHEST 1 VIEW: CPT | Performed by: RADIOLOGY

## 2025-08-09 PROCEDURE — 70450 CT HEAD/BRAIN W/O DYE: CPT

## 2025-08-09 PROCEDURE — 93005 ELECTROCARDIOGRAM TRACING: CPT

## 2025-08-09 PROCEDURE — 94660 CPAP INITIATION&MGMT: CPT

## 2025-08-09 PROCEDURE — 87040 BLOOD CULTURE FOR BACTERIA: CPT | Mod: PARLAB

## 2025-08-09 PROCEDURE — 96374 THER/PROPH/DIAG INJ IV PUSH: CPT | Mod: 59

## 2025-08-09 PROCEDURE — 96366 THER/PROPH/DIAG IV INF ADDON: CPT | Mod: 59

## 2025-08-09 PROCEDURE — 5A09457 ASSISTANCE WITH RESPIRATORY VENTILATION, 24-96 CONSECUTIVE HOURS, CONTINUOUS POSITIVE AIRWAY PRESSURE: ICD-10-PCS | Performed by: INTERNAL MEDICINE

## 2025-08-09 PROCEDURE — 99223 1ST HOSP IP/OBS HIGH 75: CPT | Performed by: INTERNAL MEDICINE

## 2025-08-09 PROCEDURE — 70496 CT ANGIOGRAPHY HEAD: CPT | Performed by: RADIOLOGY

## 2025-08-09 PROCEDURE — 80307 DRUG TEST PRSMV CHEM ANLYZR: CPT

## 2025-08-09 PROCEDURE — 80143 DRUG ASSAY ACETAMINOPHEN: CPT

## 2025-08-09 PROCEDURE — 2500000005 HC RX 250 GENERAL PHARMACY W/O HCPCS

## 2025-08-09 PROCEDURE — 85025 COMPLETE CBC W/AUTO DIFF WBC: CPT

## 2025-08-09 PROCEDURE — 2020000001 HC ICU ROOM DAILY

## 2025-08-09 PROCEDURE — 83690 ASSAY OF LIPASE: CPT

## 2025-08-09 RX ORDER — BUMETANIDE 2 MG/1
3 TABLET ORAL
Status: ON HOLD | COMMUNITY

## 2025-08-09 RX ORDER — HYDROXYCHLOROQUINE SULFATE 200 MG/1
200 TABLET, FILM COATED ORAL 2 TIMES DAILY
Status: ON HOLD | COMMUNITY
Start: 2025-07-28

## 2025-08-09 RX ORDER — OXYCODONE HYDROCHLORIDE 5 MG/1
5 TABLET ORAL EVERY 8 HOURS PRN
Status: ON HOLD | COMMUNITY
Start: 2025-07-18

## 2025-08-09 RX ORDER — TRAZODONE HYDROCHLORIDE 150 MG/1
150-300 TABLET ORAL NIGHTLY PRN
Status: ON HOLD | COMMUNITY
Start: 2025-07-24

## 2025-08-09 RX ORDER — AMOXICILLIN AND CLAVULANATE POTASSIUM 875; 125 MG/1; MG/1
875 TABLET, FILM COATED ORAL 2 TIMES DAILY
Status: ON HOLD | COMMUNITY
Start: 2025-08-07 | End: 2025-08-17

## 2025-08-09 RX ORDER — LOPERAMIDE HYDROCHLORIDE 2 MG/1
2 CAPSULE ORAL
Status: ON HOLD | COMMUNITY
Start: 2025-04-11

## 2025-08-09 RX ORDER — VANCOMYCIN HYDROCHLORIDE 1 G/20ML
INJECTION, POWDER, LYOPHILIZED, FOR SOLUTION INTRAVENOUS DAILY PRN
Status: DISPENSED | OUTPATIENT
Start: 2025-08-09

## 2025-08-09 RX ORDER — DEXTROSE MONOHYDRATE AND SODIUM CHLORIDE 5; .9 G/100ML; G/100ML
150 INJECTION, SOLUTION INTRAVENOUS CONTINUOUS
Status: DISCONTINUED | OUTPATIENT
Start: 2025-08-09 | End: 2025-08-09

## 2025-08-09 RX ORDER — VANCOMYCIN HYDROCHLORIDE 1.5 G/300ML
1500 INJECTION, SOLUTION INTRAVITREAL ONCE
Status: COMPLETED | OUTPATIENT
Start: 2025-08-09 | End: 2025-08-09

## 2025-08-09 RX ORDER — HEPARIN SODIUM 10000 [USP'U]/100ML
0-4000 INJECTION, SOLUTION INTRAVENOUS CONTINUOUS
Status: DISPENSED | OUTPATIENT
Start: 2025-08-09

## 2025-08-09 RX ORDER — FERROUS SULFATE 325(65) MG
325 TABLET ORAL
Status: ON HOLD | COMMUNITY
Start: 2025-07-28

## 2025-08-09 RX ORDER — SUCRALFATE 1 G/10ML
1 SUSPENSION ORAL 4 TIMES DAILY
Status: ON HOLD | COMMUNITY
Start: 2025-06-18

## 2025-08-09 RX ORDER — LIDOCAINE HYDROCHLORIDE 20 MG/ML
15 SOLUTION ORAL; TOPICAL AS NEEDED
Status: ON HOLD | COMMUNITY
Start: 2025-06-16

## 2025-08-09 RX ORDER — LOPERAMIDE HYDROCHLORIDE 2 MG/1
4 CAPSULE ORAL
Status: ON HOLD | COMMUNITY
Start: 2024-12-18

## 2025-08-09 RX ORDER — FOLIC ACID 1 MG/1
1 TABLET ORAL DAILY
Status: ON HOLD | COMMUNITY
Start: 2025-07-28

## 2025-08-09 RX ORDER — B-COMPLEX WITH VITAMIN C
1 TABLET ORAL DAILY
Status: ON HOLD | COMMUNITY
Start: 2025-07-28

## 2025-08-09 RX ORDER — CEFEPIME HYDROCHLORIDE 2 G/50ML
2 INJECTION, SOLUTION INTRAVENOUS ONCE
Status: COMPLETED | OUTPATIENT
Start: 2025-08-09 | End: 2025-08-09

## 2025-08-09 RX ORDER — DEXTROSE 50 % IN WATER (D50W) INTRAVENOUS SYRINGE
25 ONCE
Status: COMPLETED | OUTPATIENT
Start: 2025-08-09 | End: 2025-08-09

## 2025-08-09 RX ORDER — IPRATROPIUM BROMIDE AND ALBUTEROL SULFATE 2.5; .5 MG/3ML; MG/3ML
3 SOLUTION RESPIRATORY (INHALATION)
Status: DISCONTINUED | OUTPATIENT
Start: 2025-08-10 | End: 2025-08-10

## 2025-08-09 RX ORDER — DIAZEPAM 10 MG/1
10 TABLET ORAL EVERY 8 HOURS PRN
Status: ON HOLD | COMMUNITY
Start: 2025-08-06

## 2025-08-09 RX ORDER — TALC
3 POWDER (GRAM) TOPICAL NIGHTLY
Status: ON HOLD | COMMUNITY
Start: 2025-07-28

## 2025-08-09 RX ORDER — NALOXONE HYDROCHLORIDE 1 MG/ML
1 INJECTION INTRAMUSCULAR; INTRAVENOUS; SUBCUTANEOUS ONCE
Status: COMPLETED | OUTPATIENT
Start: 2025-08-09 | End: 2025-08-09

## 2025-08-09 RX ORDER — TRIAMCINOLONE ACETONIDE 1 MG/G
OINTMENT TOPICAL 2 TIMES DAILY PRN
Status: ON HOLD | COMMUNITY
Start: 2025-05-23

## 2025-08-09 RX ADMIN — NALOXONE HYDROCHLORIDE 1 MG: 1 INJECTION PARENTERAL at 15:35

## 2025-08-09 RX ADMIN — NALOXONE HYDROCHLORIDE 1 MG: 1 INJECTION PARENTERAL at 15:25

## 2025-08-09 RX ADMIN — Medication 1 DOSE: at 15:49

## 2025-08-09 RX ADMIN — HEPARIN SODIUM 1000 UNITS/HR: 10000 INJECTION, SOLUTION INTRAVENOUS at 19:41

## 2025-08-09 RX ADMIN — Medication 1 DOSE: at 22:38

## 2025-08-09 RX ADMIN — SODIUM CHLORIDE 2229 ML: 0.9 INJECTION, SOLUTION INTRAVENOUS at 17:40

## 2025-08-09 RX ADMIN — VANCOMYCIN HYDROCHLORIDE 1.5 G: 1.5 INJECTION, SOLUTION INTRAVITREAL at 19:00

## 2025-08-09 RX ADMIN — PIPERACILLIN SODIUM AND TAZOBACTAM SODIUM 3.38 G: 3; .375 INJECTION, SOLUTION INTRAVENOUS at 22:34

## 2025-08-09 RX ADMIN — DEXTROSE AND SODIUM CHLORIDE 100 ML/HR: 5; .9 INJECTION, SOLUTION INTRAVENOUS at 22:35

## 2025-08-09 RX ADMIN — Medication 1 DOSE: at 21:30

## 2025-08-09 RX ADMIN — DEXTROSE MONOHYDRATE 25 G: 25 INJECTION, SOLUTION INTRAVENOUS at 15:20

## 2025-08-09 RX ADMIN — IOHEXOL 150 ML: 350 INJECTION, SOLUTION INTRAVENOUS at 18:56

## 2025-08-09 RX ADMIN — CEFEPIME HYDROCHLORIDE 2 G: 2 INJECTION, SOLUTION INTRAVENOUS at 17:40

## 2025-08-09 ASSESSMENT — LIFESTYLE VARIABLES
HAVE PEOPLE ANNOYED YOU BY CRITICIZING YOUR DRINKING: NO
TOTAL SCORE: 0
HAVE YOU EVER FELT YOU SHOULD CUT DOWN ON YOUR DRINKING: NO
EVER FELT BAD OR GUILTY ABOUT YOUR DRINKING: NO
EVER HAD A DRINK FIRST THING IN THE MORNING TO STEADY YOUR NERVES TO GET RID OF A HANGOVER: NO

## 2025-08-09 NOTE — ED TRIAGE NOTES
Pt brought in via EMS for unresponsiveness. Pt's LKW is unknown. per EMS, blood sugar 64. Pt has a hx of C-Diff. Upon arrival, pt is unresponsive to stimuli. GCS 7. Blood sugar 56. Per EMS, pt takes oxycodone. No narcan given en route. Upon arrival to ED, Pt given 2mg narcan and responded. Pt also given D5 for hypoglycemia upon arrival.     Pt noted to have 2 large red spots to L neck and chest. Pt also has implantable port to L chest.

## 2025-08-09 NOTE — ED PROVIDER NOTES
HPI   No chief complaint on file.      45-year-old female history of pulmonary hypertension on 2 to 3 L of oxygen nasal cannula at baseline, TIA, fibromyalgia, depression presents emergency department for complaint of unresponsiveness.  Patient brought by EMS after patient did not come out of her room this morning and family went to go check on her this afternoon noting that she was unresponsive.  Mother states that she did note that patient did have a bowl of cereal at bedside.  Patient was 77% on room air and was placed on nonrebreather.              Patient History   Medical History[1]  Surgical History[2]  Family History[3]  Social History[4]    Physical Exam   ED Triage Vitals [08/09/25 1520]   Temp Heart Rate Respirations BP   -- 100 20 125/85      Pulse Ox Temp src Heart Rate Source Patient Position   (!) 91 % -- Monitor Lying      BP Location FiO2 (%)     Right leg --       Physical Exam  Constitutional:       Appearance: She is obese.      Comments: Unresponsive   HENT:      Head: Normocephalic and atraumatic.      Mouth/Throat:      Mouth: Mucous membranes are moist.     Eyes:      Comments: Bilateral pinpoint pupils     Cardiovascular:      Rate and Rhythm: Normal rate.      Pulses: Normal pulses.   Pulmonary:      Effort: Respiratory distress present.      Breath sounds: Examination of the right-lower field reveals rales. Examination of the left-lower field reveals rales. Rhonchi and rales present.   Abdominal:      General: Abdomen is flat. There is no distension.      Palpations: Abdomen is soft.      Tenderness: There is no abdominal tenderness.     Musculoskeletal:         General: Normal range of motion.      Cervical back: Normal range of motion.      Right lower leg: No edema.      Left lower leg: No edema.     Skin:     General: Skin is warm.     Neurological:      Mental Status: She is oriented to person, place, and time. Mental status is at baseline.      GCS: GCS eye subscore is 3. GCS verbal  subscore is 2. GCS motor subscore is 3.           ED Course & MDM   ED Course as of 08/09/25 2115   Sat Aug 09, 2025   1607 After patient boosted up and received Narcan patient moving spontaneously verbally incomprehensible sounds.  Was able to wean high flow to 50 L at 45% FiO2. [AC]   1619 EKG independently interpreted by myself showing evidence of normal sinus rhythm, ventricular rate 99, , QTc 562.  Right axis deviation.  No signs of ST elevation depression or T wave inversions consistent with ischemia [AC]   1620 Respiratory acidosis noted on VBG with elevated lactate. [AC]   1640 Chest x-ray independently interpreted by myself largely unchanged from previous on 10/8/2022.  No acute cardiopulmonary process.  Official radiology read pending. [AC]   1715 Lactate(!!): 4.7 [AC]   1715 Troponin I, High Sensitivity(!!): 581 [AC]   1821 Troponin I, High Sensitivity(!!): 723 [AC]   1824 Creatinine(!): 2.51  CHACHA [AC]   1824 EGFR(!): 24 [AC]   1857 CT angiogram of chest independently interpreted by myself showing no evidence of pulmonary embolism.  Some consolidation noted on the right lung base.  Official radiology read pending [AC]   1857 CT of head and neck independently turbid by myself showing no evidence of ischemia or bleed.  Official radiology read pending [AC]   2044 WBC(!): 19.6 [AC]   2114 Spoke to Dr. Gabriel who agrees accept patient for further workup and admission in the setting of sepsis and altered mental status [AC]      ED Course User Index  [AC] Macie Ceja,          Diagnoses as of 08/09/25 2115   Altered mental status, unspecified altered mental status type   CHACHA (acute kidney injury)   Acute hypoxic respiratory failure   NSTEMI (non-ST elevated myocardial infarction) (Multi)   Sepsis with acute renal failure, due to unspecified organism, unspecified acute renal failure type, unspecified whether septic shock present (Multi)                 No data recorded     Princess Coma Scale Score: 9  (08/09/25 1554 : Graciela Saldivar RN)                           Medical Decision Making  45-year-old female history of pulmonary hypertension on 2 to 3 L of oxygen nasal cannula at baseline, TIA, fibromyalgia, depression presents emergency department for complaint of unresponsiveness.  Patient brought by EMS after patient did not come out of her room this morning and family went to go check on her this afternoon noting that she was unresponsive.  Mother states that she did note that patient did have a bowl of cereal at bedside.  Patient was 77% on room air and was placed on nonrebreather.  Physical exam on physical exam initially patient unresponsive, blood sugar checked in the upper 40s and was in the low 60s per EMS but no dextrose was given.  1 amp of dextrose was given and on recheck in the 140s.  Patient does also have bilateral pinpoint pupils and on chart review patient is on oxycodone and Valium for chronic pain and fibromyalgia.  1 mg Narcan given and on reassessment patient moving extremities making sounds and opening eyes to voice following commands.  Additional 1 mg Narcan as patient was still not fully back to baseline.  After medications and we are able to de-escalate nonrebreather to 60% FiO2 at 60 L high flow nasal cannula.  We are continuing to further de-escalate and currently were able to get to 50 L and 45% FiO2.  Patient's labs do show significant CHACHA, elevated troponins consistent with likely demand ischemia from NSTEMI.  Elevated lactate at 4.  Patient meeting SIRS criteria for sepsis with endorgan damage noted in troponin and creatinine elevation.  Started IV antibiotics broad-spectrum and gave 30 cc/kg bolus.  IV heparin drip started for concern of NSTEMI. on my reevaluation patient did respond well to IV fluids.  CTs independently interpreted myself showing no evidence of clots or ischemia.  Patient's mother does state that she was recently treated with oral vancomycin for concern of C.  difficile.  I did speak with hospitalist, Dr. Gabriel to discuss possibility of sepsis from this.  Patient is more alert responding to commands and moving spontaneously.    Amount and/or Complexity of Data Reviewed  Labs: ordered. Decision-making details documented in ED Course.  Radiology: ordered and independent interpretation performed. Decision-making details documented in ED Course.  ECG/medicine tests: ordered and independent interpretation performed. Decision-making details documented in ED Course.  Discussion of management or test interpretation with external provider(s): ED course    Risk  Prescription drug management.  Decision regarding hospitalization.        Procedure  Critical Care    Performed by: Macie Ceja DO  Authorized by: Macie Ceja DO    Critical care provider statement:     Critical care time (minutes):  60    Critical care time was exclusive of:  Separately billable procedures and treating other patients and teaching time    Critical care was necessary to treat or prevent imminent or life-threatening deterioration of the following conditions:  Sepsis, CNS failure or compromise and renal failure    Critical care was time spent personally by me on the following activities:  Blood draw for specimens, development of treatment plan with patient or surrogate, discussions with primary provider, evaluation of patient's response to treatment, examination of patient, interpretation of cardiac output measurements, obtaining history from patient or surrogate, ordering and performing treatments and interventions, ordering and review of laboratory studies, ordering and review of radiographic studies, pulse oximetry, re-evaluation of patient's condition and review of old charts    Care discussed with: admitting provider           [1]   Past Medical History:  Diagnosis Date    Abnormal uterine bleeding (AUB)     Acute on chronic systolic CHF (congestive heart failure)     last saw cardiology 2020  - appt needed, pt working on making appt.  Currently on cancellation list - scheduled for  (after surgery) pt aware needs cardiology appt prior to procedure    Acute pericarditis     admitted 3/2024, resolved per pulm note.    Adverse effect of anesthesia     ashtma attack when waking up    Alopecia areata     Anxiety     Asthma     Chronic headache     Chronic ITP (idiopathic thrombocytopenic purpura) (Multi)     s/p splenectomy.  4.30.24 - plt 407    Chronic respiratory failure     on 2-3L baseline    Chronic sinusitis     Cushing's disease (Multi)     Depression     Endometriosis     Eosinophilia     Fibromyalgia     Gastritis     Hypertension     Hypokalemia     4.30.24: K 3.4 - oral supplement    Lumbar spondylosis     Lupus nephritis (Multi)     GFR 79, BUN 26, creat 0.93    PAH (pulmonary artery hypertension) (Multi)     follows with Dr. Aponte at [facility] - sotatercept, Winrevair NYHA class 3    Personal history of pulmonary embolism      - while on oral birth control    Raynaud disease     RLS (restless legs syndrome)     SLE (systemic lupus erythematosus) (Multi)     Sleep apnea     CPAP with O2 bleed in    TIA (transient ischemic attack)     15 years ago per pt, residual memory issues   [2]   Past Surgical History:  Procedure Laterality Date    CERVICAL CONIZATION   W/ LASER      x 3    DILATION AND CURETTAGE OF UTERUS      HYSTEROSCOPY      NASAL SEPTUM SURGERY      OTHER SURGICAL HISTORY      Laparoscopy/hysteroscopy    OTHER SURGICAL HISTORY  2018    Surgically induced     OTHER SURGICAL HISTORY      RFA - back    SPLENECTOMY, TOTAL  2018    Splenectomy   [3]   Family History  Problem Relation Name Age of Onset    Heart failure Father      Stroke Father     [4]   Social History  Tobacco Use    Smoking status: Never    Smokeless tobacco: Never   Vaping Use    Vaping status: Never Used   Substance Use Topics    Alcohol use: Not Currently     Comment: rarely    Drug use: Never         Macie Ceja,   08/09/25 3050

## 2025-08-10 ENCOUNTER — APPOINTMENT (OUTPATIENT)
Dept: RADIOLOGY | Facility: HOSPITAL | Age: 45
End: 2025-08-10
Payer: COMMERCIAL

## 2025-08-10 LAB
ALBUMIN SERPL BCP-MCNC: 3.1 G/DL (ref 3.4–5)
ALBUMIN SERPL BCP-MCNC: 3.5 G/DL (ref 3.4–5)
ALP SERPL-CCNC: 89 U/L (ref 33–110)
ALT SERPL W P-5'-P-CCNC: 411 U/L (ref 7–45)
ANION GAP BLDA CALCULATED.4IONS-SCNC: -1 MMO/L (ref 10–25)
ANION GAP SERPL CALC-SCNC: 14 MMOL/L (ref 10–20)
ARTERIAL PATENCY WRIST A: POSITIVE
AST SERPL W P-5'-P-CCNC: 388 U/L (ref 9–39)
BACTERIA BLD CULT: NORMAL
BACTERIA BLD CULT: NORMAL
BASE EXCESS BLDA CALC-SCNC: 9.3 MMOL/L (ref -2–3)
BASOPHILS # BLD MANUAL: 0 X10*3/UL (ref 0–0.1)
BASOPHILS NFR BLD MANUAL: 0 %
BILIRUB DIRECT SERPL-MCNC: 0.1 MG/DL (ref 0–0.3)
BILIRUB SERPL-MCNC: 0.4 MG/DL (ref 0–1.2)
BODY TEMPERATURE: 37 DEGREES CELSIUS
BUN SERPL-MCNC: 21 MG/DL (ref 6–23)
BURR CELLS BLD QL SMEAR: ABNORMAL
CA-I BLDA-SCNC: 1.19 MMOL/L (ref 1.1–1.33)
CALCIUM SERPL-MCNC: 8.3 MG/DL (ref 8.6–10.3)
CARDIAC TROPONIN I PNL SERPL HS: 526 NG/L (ref 0–13)
CARDIAC TROPONIN I PNL SERPL HS: 871 NG/L (ref 0–13)
CHLORIDE BLDA-SCNC: 100 MMOL/L (ref 98–107)
CHLORIDE SERPL-SCNC: 101 MMOL/L (ref 98–107)
CK SERPL-CCNC: ABNORMAL U/L (ref 0–215)
CK SERPL-CCNC: ABNORMAL U/L (ref 0–215)
CO2 SERPL-SCNC: 28 MMOL/L (ref 21–32)
CREAT SERPL-MCNC: 1.87 MG/DL (ref 0.5–1.05)
EGFRCR SERPLBLD CKD-EPI 2021: 34 ML/MIN/1.73M*2
EOSINOPHIL # BLD MANUAL: 0 X10*3/UL (ref 0–0.7)
EOSINOPHIL NFR BLD MANUAL: 0 %
EPAP CMH2O: 8 CM H2O
ERYTHROCYTE [DISTWIDTH] IN BLOOD BY AUTOMATED COUNT: 21.9 % (ref 11.5–14.5)
ERYTHROCYTE [DISTWIDTH] IN BLOOD BY AUTOMATED COUNT: 22.1 % (ref 11.5–14.5)
FLUAV RNA RESP QL NAA+PROBE: NOT DETECTED
FLUBV RNA RESP QL NAA+PROBE: NOT DETECTED
GLUCOSE BLD MANUAL STRIP-MCNC: 117 MG/DL (ref 74–99)
GLUCOSE BLD MANUAL STRIP-MCNC: 125 MG/DL (ref 74–99)
GLUCOSE BLD MANUAL STRIP-MCNC: 135 MG/DL (ref 74–99)
GLUCOSE BLD MANUAL STRIP-MCNC: 137 MG/DL (ref 74–99)
GLUCOSE BLD MANUAL STRIP-MCNC: 142 MG/DL (ref 74–99)
GLUCOSE BLD MANUAL STRIP-MCNC: 158 MG/DL (ref 74–99)
GLUCOSE BLDA-MCNC: 162 MG/DL (ref 74–99)
GLUCOSE SERPL-MCNC: 115 MG/DL (ref 74–99)
HADV DNA SPEC QL NAA+PROBE: NOT DETECTED
HCO3 BLDA-SCNC: 33.5 MMOL/L (ref 22–26)
HCT VFR BLD AUTO: 54.1 % (ref 36–46)
HCT VFR BLD AUTO: 54.9 % (ref 36–46)
HCT VFR BLD EST: 51 % (ref 36–46)
HGB BLD-MCNC: 16.6 G/DL (ref 12–16)
HGB BLD-MCNC: 17 G/DL (ref 12–16)
HGB BLDA-MCNC: 17 G/DL (ref 12–16)
HMPV RNA SPEC QL NAA+PROBE: NOT DETECTED
HOLD SPECIMEN: NORMAL
HOWELL-JOLLY BOD BLD QL SMEAR: PRESENT
HPIV1 RNA SPEC QL NAA+PROBE: NOT DETECTED
HPIV2 RNA SPEC QL NAA+PROBE: NOT DETECTED
HPIV3 RNA SPEC QL NAA+PROBE: NOT DETECTED
HPIV4 RNA SPEC QL NAA+PROBE: NOT DETECTED
IMM GRANULOCYTES # BLD AUTO: 1.35 X10*3/UL (ref 0–0.7)
IMM GRANULOCYTES NFR BLD AUTO: 5.2 % (ref 0–0.9)
INHALED O2 CONCENTRATION: 40 %
IPAP CMH2O: 18 CM H2O
LACTATE BLDA-SCNC: 1.2 MMOL/L (ref 0.4–2)
LACTATE SERPL-SCNC: 1.8 MMOL/L (ref 0.4–2)
LYMPHOCYTES # BLD MANUAL: 0.26 X10*3/UL (ref 1.2–4.8)
LYMPHOCYTES NFR BLD MANUAL: 1 %
MCH RBC QN AUTO: 28.2 PG (ref 26–34)
MCH RBC QN AUTO: 28.6 PG (ref 26–34)
MCHC RBC AUTO-ENTMCNC: 30.7 G/DL (ref 32–36)
MCHC RBC AUTO-ENTMCNC: 31 G/DL (ref 32–36)
MCV RBC AUTO: 92 FL (ref 80–100)
MCV RBC AUTO: 92 FL (ref 80–100)
MONOCYTES # BLD MANUAL: 0.78 X10*3/UL (ref 0.1–1)
MONOCYTES NFR BLD MANUAL: 3 %
NEUTROPHILS # BLD MANUAL: 24.44 X10*3/UL (ref 1.2–7.7)
NEUTS BAND # BLD MANUAL: 2.34 X10*3/UL (ref 0–0.7)
NEUTS BAND NFR BLD MANUAL: 9 %
NEUTS SEG # BLD MANUAL: 22.1 X10*3/UL (ref 1.2–7)
NEUTS SEG NFR BLD MANUAL: 85 %
NRBC BLD-RTO: 3.8 /100 WBCS (ref 0–0)
NRBC BLD-RTO: 3.9 /100 WBCS (ref 0–0)
OXYHGB MFR BLDA: 97.3 % (ref 94–98)
PCO2 BLDA: 42 MM HG (ref 38–42)
PH BLDA: 7.51 PH (ref 7.38–7.42)
PHOSPHATE SERPL-MCNC: 4 MG/DL (ref 2.5–4.9)
PLATELET # BLD AUTO: 262 X10*3/UL (ref 150–450)
PLATELET # BLD AUTO: 273 X10*3/UL (ref 150–450)
PO2 BLDA: 154 MM HG (ref 85–95)
POLYCHROMASIA BLD QL SMEAR: ABNORMAL
POTASSIUM BLDA-SCNC: 3 MMOL/L (ref 3.5–5.3)
POTASSIUM SERPL-SCNC: 4.2 MMOL/L (ref 3.5–5.3)
PROT SERPL-MCNC: 6.1 G/DL (ref 6.4–8.2)
RBC # BLD AUTO: 5.89 X10*6/UL (ref 4–5.2)
RBC # BLD AUTO: 5.94 X10*6/UL (ref 4–5.2)
RBC MORPH BLD: ABNORMAL
RHINOVIRUS RNA UPPER RESP QL NAA+PROBE: NOT DETECTED
RSV RNA RESP QL NAA+PROBE: NOT DETECTED
SAO2 % BLDA: 99 % (ref 94–100)
SARS-COV-2 RNA RESP QL NAA+PROBE: NOT DETECTED
SODIUM BLDA-SCNC: 130 MMOL/L (ref 136–145)
SODIUM SERPL-SCNC: 139 MMOL/L (ref 136–145)
SPECIMEN DRAWN FROM PATIENT: ABNORMAL
TARGETS BLD QL SMEAR: ABNORMAL
TOTAL CELLS COUNTED BLD: 100
TOTAL MINUTE VOLUME: 12.1 LITER
UFH PPP CHRO-ACNC: 0.3 IU/ML (ref ?–1.1)
UFH PPP CHRO-ACNC: 0.4 IU/ML (ref ?–1.1)
VANCOMYCIN TROUGH SERPL-MCNC: 6.6 UG/ML (ref 5–20)
VARIANT LYMPHS # BLD MANUAL: 0.52 X10*3/UL (ref 0–0.5)
VARIANT LYMPHS NFR BLD: 2 %
VENTILATOR MODE: ABNORMAL
VENTILATOR RATE: 22 BPM
WBC # BLD AUTO: 23.3 X10*3/UL (ref 4.4–11.3)
WBC # BLD AUTO: 26 X10*3/UL (ref 4.4–11.3)

## 2025-08-10 PROCEDURE — 82550 ASSAY OF CK (CPK): CPT

## 2025-08-10 PROCEDURE — 84100 ASSAY OF PHOSPHORUS: CPT | Performed by: STUDENT IN AN ORGANIZED HEALTH CARE EDUCATION/TRAINING PROGRAM

## 2025-08-10 PROCEDURE — 83605 ASSAY OF LACTIC ACID: CPT

## 2025-08-10 PROCEDURE — 85027 COMPLETE CBC AUTOMATED: CPT | Performed by: STUDENT IN AN ORGANIZED HEALTH CARE EDUCATION/TRAINING PROGRAM

## 2025-08-10 PROCEDURE — 74018 RADEX ABDOMEN 1 VIEW: CPT

## 2025-08-10 PROCEDURE — 2500000004 HC RX 250 GENERAL PHARMACY W/ HCPCS (ALT 636 FOR OP/ED): Performed by: STUDENT IN AN ORGANIZED HEALTH CARE EDUCATION/TRAINING PROGRAM

## 2025-08-10 PROCEDURE — 86160 COMPLEMENT ANTIGEN: CPT | Mod: PARLAB | Performed by: HOSPITALIST

## 2025-08-10 PROCEDURE — 2500000001 HC RX 250 WO HCPCS SELF ADMINISTERED DRUGS (ALT 637 FOR MEDICARE OP): Performed by: STUDENT IN AN ORGANIZED HEALTH CARE EDUCATION/TRAINING PROGRAM

## 2025-08-10 PROCEDURE — 87637 SARSCOV2&INF A&B&RSV AMP PRB: CPT

## 2025-08-10 PROCEDURE — 36415 COLL VENOUS BLD VENIPUNCTURE: CPT | Performed by: STUDENT IN AN ORGANIZED HEALTH CARE EDUCATION/TRAINING PROGRAM

## 2025-08-10 PROCEDURE — 85007 BL SMEAR W/DIFF WBC COUNT: CPT | Performed by: STUDENT IN AN ORGANIZED HEALTH CARE EDUCATION/TRAINING PROGRAM

## 2025-08-10 PROCEDURE — 82550 ASSAY OF CK (CPK): CPT | Performed by: STUDENT IN AN ORGANIZED HEALTH CARE EDUCATION/TRAINING PROGRAM

## 2025-08-10 PROCEDURE — 85520 HEPARIN ASSAY: CPT | Performed by: INTERNAL MEDICINE

## 2025-08-10 PROCEDURE — 99255 IP/OBS CONSLTJ NEW/EST HI 80: CPT | Performed by: INTERNAL MEDICINE

## 2025-08-10 PROCEDURE — 84484 ASSAY OF TROPONIN QUANT: CPT | Performed by: STUDENT IN AN ORGANIZED HEALTH CARE EDUCATION/TRAINING PROGRAM

## 2025-08-10 PROCEDURE — 82248 BILIRUBIN DIRECT: CPT

## 2025-08-10 PROCEDURE — 84132 ASSAY OF SERUM POTASSIUM: CPT | Performed by: STUDENT IN AN ORGANIZED HEALTH CARE EDUCATION/TRAINING PROGRAM

## 2025-08-10 PROCEDURE — 86225 DNA ANTIBODY NATIVE: CPT | Mod: PARLAB | Performed by: HOSPITALIST

## 2025-08-10 PROCEDURE — 94640 AIRWAY INHALATION TREATMENT: CPT

## 2025-08-10 PROCEDURE — 94799 UNLISTED PULMONARY SVC/PX: CPT

## 2025-08-10 PROCEDURE — 36600 WITHDRAWAL OF ARTERIAL BLOOD: CPT

## 2025-08-10 PROCEDURE — 87631 RESP VIRUS 3-5 TARGETS: CPT | Mod: PARLAB

## 2025-08-10 PROCEDURE — 94660 CPAP INITIATION&MGMT: CPT

## 2025-08-10 PROCEDURE — 74018 RADEX ABDOMEN 1 VIEW: CPT | Performed by: RADIOLOGY

## 2025-08-10 PROCEDURE — 80202 ASSAY OF VANCOMYCIN: CPT | Performed by: INTERNAL MEDICINE

## 2025-08-10 PROCEDURE — 82947 ASSAY GLUCOSE BLOOD QUANT: CPT

## 2025-08-10 PROCEDURE — 2500000005 HC RX 250 GENERAL PHARMACY W/O HCPCS: Performed by: STUDENT IN AN ORGANIZED HEALTH CARE EDUCATION/TRAINING PROGRAM

## 2025-08-10 PROCEDURE — 74176 CT ABD & PELVIS W/O CONTRAST: CPT

## 2025-08-10 PROCEDURE — 74176 CT ABD & PELVIS W/O CONTRAST: CPT | Performed by: STUDENT IN AN ORGANIZED HEALTH CARE EDUCATION/TRAINING PROGRAM

## 2025-08-10 PROCEDURE — 2020000001 HC ICU ROOM DAILY

## 2025-08-10 PROCEDURE — 99291 CRITICAL CARE FIRST HOUR: CPT

## 2025-08-10 PROCEDURE — 82040 ASSAY OF SERUM ALBUMIN: CPT

## 2025-08-10 PROCEDURE — 2500000004 HC RX 250 GENERAL PHARMACY W/ HCPCS (ALT 636 FOR OP/ED)

## 2025-08-10 PROCEDURE — 2500000004 HC RX 250 GENERAL PHARMACY W/ HCPCS (ALT 636 FOR OP/ED): Performed by: INTERNAL MEDICINE

## 2025-08-10 PROCEDURE — 84484 ASSAY OF TROPONIN QUANT: CPT

## 2025-08-10 PROCEDURE — 2500000002 HC RX 250 W HCPCS SELF ADMINISTERED DRUGS (ALT 637 FOR MEDICARE OP, ALT 636 FOR OP/ED): Performed by: STUDENT IN AN ORGANIZED HEALTH CARE EDUCATION/TRAINING PROGRAM

## 2025-08-10 RX ORDER — IPRATROPIUM BROMIDE AND ALBUTEROL SULFATE 2.5; .5 MG/3ML; MG/3ML
3 SOLUTION RESPIRATORY (INHALATION)
Status: DISPENSED | OUTPATIENT
Start: 2025-08-10

## 2025-08-10 RX ORDER — DEXTROSE MONOHYDRATE AND SODIUM CHLORIDE 5; .45 G/100ML; G/100ML
100 INJECTION, SOLUTION INTRAVENOUS CONTINUOUS
Status: ACTIVE | OUTPATIENT
Start: 2025-08-10 | End: 2025-08-11

## 2025-08-10 RX ORDER — PANTOPRAZOLE SODIUM 40 MG/10ML
40 INJECTION, POWDER, LYOPHILIZED, FOR SOLUTION INTRAVENOUS 2 TIMES DAILY
Status: ACTIVE | OUTPATIENT
Start: 2025-08-10

## 2025-08-10 RX ORDER — ALBUTEROL SULFATE 0.83 MG/ML
2.5 SOLUTION RESPIRATORY (INHALATION) EVERY 2 HOUR PRN
Status: ACTIVE | OUTPATIENT
Start: 2025-08-10

## 2025-08-10 RX ORDER — VANCOMYCIN HYDROCHLORIDE 125 MG/1
125 CAPSULE ORAL 2 TIMES DAILY
Status: DISPENSED | OUTPATIENT
Start: 2025-08-10

## 2025-08-10 RX ORDER — IPRATROPIUM BROMIDE AND ALBUTEROL SULFATE 2.5; .5 MG/3ML; MG/3ML
3 SOLUTION RESPIRATORY (INHALATION)
Status: DISCONTINUED | OUTPATIENT
Start: 2025-08-10 | End: 2025-08-10

## 2025-08-10 RX ORDER — NYSTATIN 100000 [USP'U]/G
1 POWDER TOPICAL 2 TIMES DAILY
Status: DISPENSED | OUTPATIENT
Start: 2025-08-10

## 2025-08-10 RX ADMIN — PIPERACILLIN SODIUM AND TAZOBACTAM SODIUM 3.38 G: 3; .375 INJECTION, SOLUTION INTRAVENOUS at 10:14

## 2025-08-10 RX ADMIN — NYSTATIN 1 APPLICATION: 100000 POWDER TOPICAL at 23:05

## 2025-08-10 RX ADMIN — Medication: at 16:00

## 2025-08-10 RX ADMIN — Medication: at 19:03

## 2025-08-10 RX ADMIN — Medication: at 07:20

## 2025-08-10 RX ADMIN — Medication 1 DOSE: at 23:03

## 2025-08-10 RX ADMIN — VANCOMYCIN HYDROCHLORIDE 2000 MG: 10 INJECTION, POWDER, LYOPHILIZED, FOR SOLUTION INTRAVENOUS at 21:50

## 2025-08-10 RX ADMIN — NYSTATIN 1 APPLICATION: 100000 POWDER TOPICAL at 02:45

## 2025-08-10 RX ADMIN — Medication: at 12:33

## 2025-08-10 RX ADMIN — IPRATROPIUM BROMIDE AND ALBUTEROL SULFATE 3 ML: .5; 3 SOLUTION RESPIRATORY (INHALATION) at 19:01

## 2025-08-10 RX ADMIN — DEXTROSE AND SODIUM CHLORIDE 100 ML/HR: 5; 450 INJECTION, SOLUTION INTRAVENOUS at 12:03

## 2025-08-10 RX ADMIN — PIPERACILLIN SODIUM AND TAZOBACTAM SODIUM 3.38 G: 3; .375 INJECTION, SOLUTION INTRAVENOUS at 16:22

## 2025-08-10 RX ADMIN — HEPARIN SODIUM 10 UNITS/HR: 10000 INJECTION, SOLUTION INTRAVENOUS at 18:03

## 2025-08-10 RX ADMIN — IPRATROPIUM BROMIDE AND ALBUTEROL SULFATE 3 ML: .5; 3 SOLUTION RESPIRATORY (INHALATION) at 07:20

## 2025-08-10 RX ADMIN — Medication: at 02:05

## 2025-08-10 RX ADMIN — SODIUM CHLORIDE, SODIUM LACTATE, POTASSIUM CHLORIDE, AND CALCIUM CHLORIDE 500 ML: .6; .31; .03; .02 INJECTION, SOLUTION INTRAVENOUS at 00:02

## 2025-08-10 RX ADMIN — NYSTATIN 1 APPLICATION: 100000 POWDER TOPICAL at 10:14

## 2025-08-10 RX ADMIN — PIPERACILLIN SODIUM AND TAZOBACTAM SODIUM 3.38 G: 3; .375 INJECTION, SOLUTION INTRAVENOUS at 03:38

## 2025-08-10 RX ADMIN — IPRATROPIUM BROMIDE AND ALBUTEROL SULFATE 3 ML: .5; 3 SOLUTION RESPIRATORY (INHALATION) at 12:33

## 2025-08-10 RX ADMIN — DEXTROSE AND SODIUM CHLORIDE 100 ML/HR: 5; 450 INJECTION, SOLUTION INTRAVENOUS at 21:49

## 2025-08-10 RX ADMIN — DOXYCYCLINE 100 MG: 100 INJECTION, POWDER, LYOPHILIZED, FOR SOLUTION INTRAVENOUS at 10:52

## 2025-08-10 RX ADMIN — SODIUM CHLORIDE, SODIUM LACTATE, POTASSIUM CHLORIDE, AND CALCIUM CHLORIDE 500 ML: .6; .31; .03; .02 INJECTION, SOLUTION INTRAVENOUS at 07:40

## 2025-08-10 SDOH — ECONOMIC STABILITY: FOOD INSECURITY
HOW HARD IS IT FOR YOU TO PAY FOR THE VERY BASICS LIKE FOOD, HOUSING, MEDICAL CARE, AND HEATING?: PATIENT UNABLE TO ANSWER

## 2025-08-10 SDOH — SOCIAL STABILITY: SOCIAL INSECURITY: DO YOU FEEL UNSAFE GOING BACK TO THE PLACE WHERE YOU ARE LIVING?: UNABLE TO ASSESS

## 2025-08-10 SDOH — SOCIAL STABILITY: SOCIAL INSECURITY
WITHIN THE LAST YEAR, HAVE YOU BEEN RAPED OR FORCED TO HAVE ANY KIND OF SEXUAL ACTIVITY BY YOUR PARTNER OR EX-PARTNER?: PATIENT UNABLE TO ANSWER

## 2025-08-10 SDOH — SOCIAL STABILITY: SOCIAL INSECURITY: ARE THERE ANY APPARENT SIGNS OF INJURIES/BEHAVIORS THAT COULD BE RELATED TO ABUSE/NEGLECT?: NO

## 2025-08-10 SDOH — ECONOMIC STABILITY: HOUSING INSECURITY
IN THE LAST 12 MONTHS, WAS THERE A TIME WHEN YOU WERE NOT ABLE TO PAY THE MORTGAGE OR RENT ON TIME?: PATIENT UNABLE TO ANSWER

## 2025-08-10 SDOH — HEALTH STABILITY: PHYSICAL HEALTH
HOW OFTEN DO YOU NEED TO HAVE SOMEONE HELP YOU WHEN YOU READ INSTRUCTIONS, PAMPHLETS, OR OTHER WRITTEN MATERIAL FROM YOUR DOCTOR OR PHARMACY?: PATIENT UNABLE TO RESPOND

## 2025-08-10 SDOH — SOCIAL STABILITY: SOCIAL INSECURITY: DOES ANYONE TRY TO KEEP YOU FROM HAVING/CONTACTING OTHER FRIENDS OR DOING THINGS OUTSIDE YOUR HOME?: UNABLE TO ASSESS

## 2025-08-10 SDOH — ECONOMIC STABILITY: HOUSING INSECURITY: IN THE PAST 12 MONTHS, HOW MANY TIMES HAVE YOU MOVED WHERE YOU WERE LIVING?: 0

## 2025-08-10 SDOH — ECONOMIC STABILITY: FOOD INSECURITY
WITHIN THE PAST 12 MONTHS, THE FOOD YOU BOUGHT JUST DIDN'T LAST AND YOU DIDN'T HAVE MONEY TO GET MORE.: PATIENT UNABLE TO ANSWER

## 2025-08-10 SDOH — HEALTH STABILITY: PHYSICAL HEALTH: ON AVERAGE, HOW MANY MINUTES DO YOU ENGAGE IN EXERCISE AT THIS LEVEL?: PATIENT UNABLE TO ANSWER

## 2025-08-10 SDOH — SOCIAL STABILITY: SOCIAL INSECURITY
WITHIN THE LAST YEAR, HAVE YOU BEEN KICKED, HIT, SLAPPED, OR OTHERWISE PHYSICALLY HURT BY YOUR PARTNER OR EX-PARTNER?: PATIENT UNABLE TO ANSWER

## 2025-08-10 SDOH — SOCIAL STABILITY: SOCIAL INSECURITY
WITHIN THE LAST YEAR, HAVE YOU BEEN HUMILIATED OR EMOTIONALLY ABUSED IN OTHER WAYS BY YOUR PARTNER OR EX-PARTNER?: PATIENT UNABLE TO ANSWER

## 2025-08-10 SDOH — ECONOMIC STABILITY: TRANSPORTATION INSECURITY
IN THE PAST 12 MONTHS, HAS LACK OF TRANSPORTATION KEPT YOU FROM MEDICAL APPOINTMENTS OR FROM GETTING MEDICATIONS?: PATIENT UNABLE TO ANSWER

## 2025-08-10 SDOH — HEALTH STABILITY: PHYSICAL HEALTH
ON AVERAGE, HOW MANY DAYS PER WEEK DO YOU ENGAGE IN MODERATE TO STRENUOUS EXERCISE (LIKE A BRISK WALK)?: PATIENT UNABLE TO ANSWER

## 2025-08-10 SDOH — ECONOMIC STABILITY: HOUSING INSECURITY: AT ANY TIME IN THE PAST 12 MONTHS, WERE YOU HOMELESS OR LIVING IN A SHELTER (INCLUDING NOW)?: PATIENT UNABLE TO ANSWER

## 2025-08-10 SDOH — SOCIAL STABILITY: SOCIAL INSECURITY: WERE YOU ABLE TO COMPLETE ALL THE BEHAVIORAL HEALTH SCREENINGS?: NO

## 2025-08-10 SDOH — ECONOMIC STABILITY: INCOME INSECURITY
IN THE PAST 12 MONTHS HAS THE ELECTRIC, GAS, OIL, OR WATER COMPANY THREATENED TO SHUT OFF SERVICES IN YOUR HOME?: PATIENT UNABLE TO ANSWER

## 2025-08-10 SDOH — SOCIAL STABILITY: SOCIAL INSECURITY: WITHIN THE LAST YEAR, HAVE YOU BEEN AFRAID OF YOUR PARTNER OR EX-PARTNER?: PATIENT UNABLE TO ANSWER

## 2025-08-10 SDOH — ECONOMIC STABILITY: FOOD INSECURITY
WITHIN THE PAST 12 MONTHS, YOU WORRIED THAT YOUR FOOD WOULD RUN OUT BEFORE YOU GOT THE MONEY TO BUY MORE.: PATIENT UNABLE TO ANSWER

## 2025-08-10 SDOH — SOCIAL STABILITY: SOCIAL INSECURITY: ABUSE: ADULT

## 2025-08-10 SDOH — SOCIAL STABILITY: SOCIAL INSECURITY: DO YOU FEEL ANYONE HAS EXPLOITED OR TAKEN ADVANTAGE OF YOU FINANCIALLY OR OF YOUR PERSONAL PROPERTY?: UNABLE TO ASSESS

## 2025-08-10 SDOH — SOCIAL STABILITY: SOCIAL INSECURITY: ARE YOU OR HAVE YOU BEEN THREATENED OR ABUSED PHYSICALLY, EMOTIONALLY, OR SEXUALLY BY ANYONE?: UNABLE TO ASSESS

## 2025-08-10 SDOH — SOCIAL STABILITY: SOCIAL INSECURITY: HAVE YOU HAD ANY THOUGHTS OF HARMING ANYONE ELSE?: UNABLE TO ASSESS

## 2025-08-10 SDOH — SOCIAL STABILITY: SOCIAL INSECURITY: HAS ANYONE EVER THREATENED TO HURT YOUR FAMILY OR YOUR PETS?: UNABLE TO ASSESS

## 2025-08-10 ASSESSMENT — PAIN - FUNCTIONAL ASSESSMENT
PAIN_FUNCTIONAL_ASSESSMENT: CPOT (CRITICAL CARE PAIN OBSERVATION TOOL)

## 2025-08-10 ASSESSMENT — COGNITIVE AND FUNCTIONAL STATUS - GENERAL
EATING MEALS: TOTAL
MOVING FROM LYING ON BACK TO SITTING ON SIDE OF FLAT BED WITH BEDRAILS: A LOT
DRESSING REGULAR LOWER BODY CLOTHING: TOTAL
PATIENT BASELINE BEDBOUND: UNABLE TO ASSESS AT THIS TIME
TOILETING: TOTAL
TURNING FROM BACK TO SIDE WHILE IN FLAT BAD: A LOT
MOBILITY SCORE: 8
CLIMB 3 TO 5 STEPS WITH RAILING: TOTAL
DAILY ACTIVITIY SCORE: 6
WALKING IN HOSPITAL ROOM: TOTAL
MOVING FROM LYING ON BACK TO SITTING ON SIDE OF FLAT BED WITH BEDRAILS: A LOT
MOVING TO AND FROM BED TO CHAIR: TOTAL
DRESSING REGULAR UPPER BODY CLOTHING: TOTAL
DRESSING REGULAR LOWER BODY CLOTHING: TOTAL
MOBILITY SCORE: 8
HELP NEEDED FOR BATHING: TOTAL
STANDING UP FROM CHAIR USING ARMS: TOTAL
PERSONAL GROOMING: TOTAL
TURNING FROM BACK TO SIDE WHILE IN FLAT BAD: A LOT
MOVING TO AND FROM BED TO CHAIR: TOTAL
DAILY ACTIVITIY SCORE: 6
STANDING UP FROM CHAIR USING ARMS: TOTAL
TOILETING: TOTAL
HELP NEEDED FOR BATHING: TOTAL
DRESSING REGULAR UPPER BODY CLOTHING: TOTAL
CLIMB 3 TO 5 STEPS WITH RAILING: TOTAL
EATING MEALS: TOTAL
WALKING IN HOSPITAL ROOM: TOTAL
PERSONAL GROOMING: TOTAL

## 2025-08-10 ASSESSMENT — ACTIVITIES OF DAILY LIVING (ADL)
JUDGMENT_ADEQUATE_SAFELY_COMPLETE_DAILY_ACTIVITIES: UNABLE TO ASSESS
DRESSING YOURSELF: UNABLE TO ASSESS
ADEQUATE_TO_COMPLETE_ADL: UNABLE TO ASSESS
TOILETING: UNABLE TO ASSESS
LACK_OF_TRANSPORTATION: PATIENT UNABLE TO ANSWER
LACK_OF_TRANSPORTATION: PATIENT UNABLE TO ANSWER
WALKS IN HOME: UNABLE TO ASSESS
FEEDING YOURSELF: UNABLE TO ASSESS
HEARING - RIGHT EAR: UNABLE TO ASSESS
GROOMING: UNABLE TO ASSESS
PATIENT'S MEMORY ADEQUATE TO SAFELY COMPLETE DAILY ACTIVITIES?: UNABLE TO ASSESS
HEARING - LEFT EAR: UNABLE TO ASSESS
BATHING: UNABLE TO ASSESS

## 2025-08-10 ASSESSMENT — LIFESTYLE VARIABLES
SKIP TO QUESTIONS 9-10: 0
AUDIT-C TOTAL SCORE: -1
HOW OFTEN DO YOU HAVE A DRINK CONTAINING ALCOHOL: PATIENT UNABLE TO ANSWER
AUDIT-C TOTAL SCORE: -1
HOW OFTEN DO YOU HAVE 6 OR MORE DRINKS ON ONE OCCASION: PATIENT UNABLE TO ANSWER
HOW MANY STANDARD DRINKS CONTAINING ALCOHOL DO YOU HAVE ON A TYPICAL DAY: PATIENT UNABLE TO ANSWER

## 2025-08-10 NOTE — CONSULTS
Consults  History Of Present Illness:    Emilee Dempsey is a 45 y.o. female presenting with acute respiratory failure with hypoxia and hypercapnea.  She has extensive PMH including SLE, lupus nephritis, CKD, HFpEf, HTN, HLD, pulmonary hypertension.  She has chronic benzodiazipine and opiod abuse.  She was found down, She had some response to Narcan     Last Recorded Vitals:  Vitals:    08/10/25 1200 08/10/25 1233 08/10/25 1300 08/10/25 1400   BP: 139/90  171/85 153/81   BP Location:       Patient Position:       Pulse: 100  104 105   Resp: (!) 28 22 22 21   Temp: 37 °C (98.6 °F)      TempSrc: Temporal      SpO2: 97% 97% 98% 99%   Weight:       Height:           Last Labs:  CBC - 8/10/2025:  7:42 AM  23.3 16.6 262    54.1      CMP - 8/10/2025:  4:43 AM  8.3 8.7 303 --- 0.4   4.0 3.5 231 116      PTT - 8/9/2025:  7:27 PM  _   _ 31     Troponin I, High Sensitivity   Date/Time Value Ref Range Status   08/10/2025 07:42  (HH) 0 - 13 ng/L Final     Comment:     Previous result verified on 8/9/2025 1653 on specimen/case [id number] called with component [facility] for procedure Troponin I, High Sensitivity, Initial with value 581 ng/L.   08/10/2025 01:38  (HH) 0 - 13 ng/L Final     Comment:     Previous result verified on 8/9/2025 1653 on specimen/case [id number] called with component [facility] for procedure Troponin I, High Sensitivity, Initial with value 581 ng/L.   08/09/2025 05:31  (HH) 0 - 13 ng/L Final     Comment:     Previous result verified on 8/9/2025 1653 on specimen/case [id number] called with component [facility] for procedure Troponin I, High Sensitivity, Initial with value 581 ng/L.     BNP   Date/Time Value Ref Range Status   10/08/2022 10:00 AM 22 0 - 99 pg/mL Final     Comment:     .  <100 pg/mL - Heart failure unlikely  100-299 pg/mL - Intermediate probability of acute heart  .               failure exacerbation. Correlate with clinical  .               context and patient history.     ">=300 pg/mL - Heart Failure likely. Correlate with clinical  .               context and patient history.  BNP testing is performed using different testing   methodology at [facility] than at other   [facility] hospitals. Direct result comparisons should   only be made within the same method.       Hemoglobin A1C   Date/Time Value Ref Range Status   02/03/2025 08:28 PM 5.2 4.3 - 5.6 % Final     Comment:     American Diabetes Association guidelines indicate that patients with HgbA1c in the range 5.7-6.4% are at increased risk for development of diabetes, and intervention by lifestyle modification may be beneficial. HgbA1c greater or equal to 6.5% is considered diagnostic of diabetes.   09/28/2022 11:59 AM 5.5 4.3 - 5.6 % Final     Comment:     American Diabetes Association guidelines indicate that patients with HgbA1c in the range 5.7-6.4% are at increased risk for development of diabetes, and intervention by lifestyle modification may be beneficial. HgbA1c greater or equal to 6.5% is considered diagnostic of diabetes.      Last I/O:  I/O last 3 completed shifts:  In: 930.5 (8.1 mL/kg) [I.V.:330.5 (2.9 mL/kg); IV Piggyback:600]  Out: 4725 (41.2 mL/kg) [Urine:4725 (1.1 mL/kg/hr)]  Weight: 114.8 kg     Past Cardiology Tests (Last 3 Years):  EKG:  ECG 12 Lead 06/14/2024    Echo:  Transthoracic Echo Complete     Ejection Fractions:  No results found for: \"EF\"  Cath:  No results found for this or any previous visit from the past 1095 days.    Stress Test:  No results found for this or any previous visit from the past 1095 days.    Cardiac Imaging:  No results found for this or any previous visit from the past 1095 days.      Past Medical History:  She has a past medical history of Abnormal uterine bleeding (AUB), Acute on chronic systolic CHF (congestive heart failure), Acute pericarditis, Adverse effect of anesthesia, Alopecia areata, Anxiety, Asthma, Chronic headache, Chronic ITP (idiopathic thrombocytopenic " purpura) (Multi), Chronic respiratory failure, Chronic sinusitis, Cushing's disease (Multi), Depression, Endometriosis, Eosinophilia, Fibromyalgia, Gastritis, Hypertension, Hypokalemia, Lumbar spondylosis, Lupus nephritis (Multi), PAH (pulmonary artery hypertension) (Multi), Personal history of pulmonary embolism, Raynaud disease, RLS (restless legs syndrome), SLE (systemic lupus erythematosus) (Multi), Sleep apnea, and TIA (transient ischemic attack).    Past Surgical History:  She has a past surgical history that includes Other surgical history; Other surgical history (11/12/2018); Splenectomy, total (11/12/2018); Cervical conization w/ laser; Dilation and curettage of uterus; Hysteroscopy; Nasal septum surgery; and Other surgical history.      Social History:  She reports that she has never smoked. She has never used smokeless tobacco. Alcohol use questions deferred to the physician. She reports that she does not use drugs.    Family History:  Family History[1]     Allergies:  Ciprofloxacin (bulk), Levaquin [levofloxacin], Adhesive tape-silicones, Atorvastatin, Chlorhexidine, Nsaids (non-steroidal anti-inflammatory drug), Cefadroxil, and Hibiclens [chlorhexidine gluconate]    Inpatient Medications:  Scheduled Medications[2]  PRN Medications[3]  Continuous Medications[4]  Outpatient Medications:  Current Outpatient Medications   Medication Instructions    acetaminophen (TYLENOL) 1,000 mg, oral, Every 8 hours PRN    albuterol 90 mcg/actuation inhaler 2 puffs, inhalation, Every 4 hours PRN    amoxicillin-clavulanate (Augmentin) 875-125 mg tablet 875 mg of amoxicillin, 2 times daily    aspirin 81 mg, oral, Daily    B6/folic/B12/coffee/phosphatid (NEURIVA PLUS BRAIN PERFORMANCE ORAL) 1 capsule, oral, Daily    biotin 2,500 mcg capsule 1 capsule, oral, Daily RT    bumetanide (Bumex) 2 mg tablet 4 tablets, oral, Every Mon/Wed/Fri    bumetanide (Bumex) 2 mg tablet 3 tablets, oral, Every Sun/Tues/Thur/Sat    buPROPion XL  (Wellbutrin XL) 150 mg 24 hr tablet Take 1 Tablet by mouth every morning With one 300 mg tablet    buPROPion XL (WELLBUTRIN XL) 300 mg, oral, Every morning    calcium carbonate (TUMS) 1,000 mg, oral, Every 8 hours PRN    carboxymethylcellulose (Refresh Celluvisc) 1 % ophthalmic solution dropperette 1 drop, Both Eyes, 4 times daily    coenzyme Q-10 400 mg, oral, Daily    cyproheptadine (PERIACTIN) 4 mg, oral, Daily PRN    desvenlafaxine (PRISTIQ) 100 mg, oral, Daily    diazePAM (VALIUM) 10 mg, Every 8 hours PRN    diphenhydrAMINE (BENADRYL) 50 mg, oral, Every 8 hours PRN    eptinezumab (VYEPTI) 300 mg, intravenous, Every 3 months    FeroSuL 325 mg (65 mg iron) tablet 325 mg of ferrous sulfate, Daily with breakfast    fluocinonide (Lidex) 0.05 % external solution Apply twice daily Monday through Friday . Take weekends off.    fluticasone (Flonase) 50 mcg/actuation nasal spray 2 sprays, Does not apply, 2 times daily    folic acid (FOLVITE) 1 mg, Daily    gabapentin (NEURONTIN) 1,200 mg, oral, Nightly    hydroxychloroquine (PLAQUENIL) 200 mg, 2 times daily    icosapent ethyL (VASCEPA) 1 g, oral, Daily    ketoconazole (NIZOral) 2 % shampoo     ketotifen (Zaditor) 0.025 % (0.035 %) ophthalmic solution 1 drop, 2 times daily    levalbuterol (Xopenex) 1.25 mg/3 mL nebulizer solution 3 mL, inhalation, 3 times daily PRN    lidocaine (Xylocaine) 5 % ointment if needed.    Lidocaine Viscous 2 % solution 15 mL, As needed    loperamide (IMODIUM) 2 mg, 3 times daily before meals    loperamide (IMODIUM) 4 mg, 3 times daily after meals    loratadine (CLARITIN) 10 mg, oral, Daily    LORazepam (ATIVAN) 2 mg, 3 times daily PRN    magnesium oxide (Mag-Ox) 400 mg (241.3 mg magnesium) tablet 400 mg of magnesium oxide, oral, 2 times daily    melatonin 3 mg, Nightly    metOLazone (ZAROXOLYN) 5 mg, oral, Every Mon/Wed/Fri    milnacipran (SAVELLA) 100 mg, oral, 2 times daily    moisturizing mouth (Biotene Oral Dry Mouth) solution 2 sprays,  oral, Every 4 hours PRN    mometasone-formoterol (Dulera 200) 200-5 mcg/actuation inhaler Every 12 hours    montelukast (SINGULAIR) 10 mg, oral, Nightly    multivitamin with minerals (multivit-min-iron fum-folic ac) tablet oral    norethindrone (AYGESTIN) 5 mg, oral, Daily    ondansetron (ZOFRAN) 8 mg, oral, Every 8 hours PRN    One Daily Essential tablet Take 1 tablet by mouth once daily.    Opsumit 10 mg, oral, Daily    oxyCODONE (ROXICODONE) 5 mg, Every 8 hours PRN    pantoprazole (PROTONIX) 20 mg, oral, 2 times daily    potassium chloride (Klor-Con) 20 mEq packet 40 mEq, oral, 3 times daily    predniSONE (Deltasone) 5 mg tablet Take by mouth once daily:  6 tablets - 5 days, 5 tablets - 5 days, 4 tablets - 5 days, 3 tablets - 5 days, 2 tablets - 5 days, 1 tablet - 5 days, then STOP.    Refresh Optive Advanced 0.5-1-0.5 % drops 1 drop, ophthalmic (eye), 4 times daily    Rexulti 3 mg tablet 1 tablet, oral, Daily    rOPINIRole (REQUIP) 1 mg, oral, 3 times daily    Saphnelo 300 mg, intravenous, Every 28 days    sildenafil (Revatio) 20 mg tablet Take 4 tablets (80 mg) by mouth 3 times a day.    sotatercept-csrk 45 mg kit 0.7 mL, subcutaneous, Every 21 days    spironolactone (ALDACTONE) 400 mg, Daily    sucralfate (CARAFATE) 1 g, 4 times daily    sulfaSALAzine (AZULFIDINE) 1,000 mg, oral, 3 times daily    tiZANidine (ZANAFLEX) 4 mg, oral, Every 8 hours PRN    traZODone (DESYREL) 150-300 mg, oral, Nightly PRN    triamcinolone (Kenalog) 0.1 % ointment 2 times daily PRN    Ubrelvy 100 mg tablet tablet Take 1 tablet by mouth at onset of migraine/headache. May repeat dose in 2 hours if needed. Do NOT take more than 2 tablets in 24 hours. Max dose is 200 mg in 24 hours. No more than 16 tabs per month.    Vitamins B Complex tablet 1 tablet, Daily       Physical Exam:  GEN: Obese intubated 46 yo wf lying 30 degrees, unresponsive  HEENT: Atramatic, normocephalic  COR: Reg  LUNGS: Few anterior rhonchi  ABC: Obese  EXT: Warm      Assessment/Plan   1.) Acute metabolic encephalopathy from hypoxia possibel drug toxicity  2.) Acute NSTEMI  3.) Prolonged Qtc  4.) Pulmonary hypertension  5.) Lupus nephritis with CKD  6.) HFpEF  7.) HTN  8.) HLD  9.) Morbid obesity  REC:  1.) Echocardiogram  2.) Tele monitoring  3.) Agree with present plans as per the primary service  Overall prognosis is guarded        Thank you for the consultation                               Will follow with you                                                         JLS    Peripheral IV 08/09/25 22 G Posterior;Left Hand (Active)   Site Assessment Clean;Dry;Intact 08/10/25 0800   Dressing Type Transparent 08/10/25 0800   Line Status Blood return noted 08/10/25 0800   Dressing Status Clean;Dry;Occlusive 08/10/25 0800   Number of days: 1       Peripheral IV 08/09/25 20 G Anterior;Right Forearm (Active)   Site Assessment Clean;Dry;Intact 08/10/25 0800   Dressing Type Transparent 08/10/25 0800   Line Status Flushed 08/10/25 0800   Dressing Status Clean;Dry;Occlusive 08/10/25 0800   Number of days: 1       Implantable Port 08/09/25 Left Chest Single lumen port (Active)   Line Necessity Intravenous fluid therapy 08/10/25 0800   Site Assessment Clean;Dry;Intact;Red 08/10/25 0800   Line Status (1) Infusing;No blood return 08/10/25 0800   Flush Performed (1) Yes 08/10/25 0800   Dressing Status Clean;Dry 08/10/25 0800   Number of days: 1       Urethral Catheter 16 Fr. (Active)   Output (mL) 120 mL 08/10/25 1400   Number of days: 1       Code Status:  Full Code    I spent 30 minutes in the professional and overall care of this patient.        Ata Anna MD       [1]   Family History  Problem Relation Name Age of Onset    Heart failure Father      Stroke Father     [2]   Scheduled medications   Medication Dose Route Frequency    doxycycline  100 mg intravenous q12h    ipratropium-albuteroL  3 mL nebulization TID    nystatin  1 Application Topical BID    [Transfer Hold]  pantoprazole  40 mg intravenous BID    piperacillin-tazobactam  3.375 g intravenous q6h    vancomycin  125 mg oral BID   [3]   PRN medications   Medication    albuterol    heparin    oxygen    vancomycin   [4]   Continuous Medications   Medication Dose Last Rate    dextrose 5%-0.45 % sodium chloride  100 mL/hr 100 mL/hr (08/10/25 1310)    heparin  0-4,000 Units/hr 1,000 Units/hr (08/09/25 2318)

## 2025-08-10 NOTE — CONSULTS
Vancomycin Dosing by Pharmacy- INITIAL    Emilee Dempsey is a 45 y.o. year old female who Pharmacy has been consulted for vancomycin dosing for sepsis unknown source.   Based on the patient's indication and renal status this patient will be dosed based on a goal trough/random level of 15-20.     Renal function is currently declining.    Visit Vitals  /71   Pulse 96   Temp 36.8 °C (98.2 °F) (Temporal)   Resp 20        Lab Results   Component Value Date    CREATININE 2.51 (H) 2025    CREATININE 0.97 2025    CREATININE 0.84 2024    CREATININE 0.71 2024        Patient weight is as follows:   Vitals:    25 1614   Weight: 114 kg (251 lb)   Cultures:  No results found for the encounter in last 14 days.    Temp (24hrs), Av.8 °C (98.2 °F), Min:36.8 °C (98.2 °F), Max:36.8 °C (98.2 °F)     Assessment/Plan     Patient has already been given a loading dose of 1500 mg.  Will initiate vancomycin dosing by levels.  Follow-up level will be ordered on 8/10 at 1800 unless clinically indicated sooner.  Will continue to monitor renal function daily while on vancomycin and order serum creatinine at least every 48 hours if not already ordered.  Follow for continued vancomycin needs, clinical response, and signs/symptoms of toxicity.   Beth [facility]

## 2025-08-10 NOTE — H&P
I have seen the patient either independently or with an associated resident physician or advanced practice provider.    In brief, Emilee Dempsey is a 45 y.o. female with past medical history of SLE, lupus nephritis, CKD, HFpEF, HTN, HLD, pulmonary hypertension who presents to [facility] ICU for acute respiratory failure with hypoxia and hypercapnia, acute encephalopathy, and rhabdomyolysis. History taken from clinician collateral as unable to speak with patient's next-of-kin. Patient was found down and unresponsive in her room this afternoon. Work-up was notable for hypoxia and hypercapnia, in addition to lactate elevation to 5.5, CK 13,705 and acute kidney injury. Patient has history of chronic benzodiazepine and opioid use, showed some response to narcan and bipap administration but remains somnolent, rousing to voice.    Will continue bipap therapy at this time, continue gentle fluid resuscitation as IVC shows collapse and labs are indicative of moderate rhabdomyolysis. Unable to identify traumatic injury on exam or imaging. However, fluid resuscitation will be judicious in setting of pulmonary hypertension.    Physical Exam  Constitutional:       Appearance: She is ill-appearing.   HENT:      Mouth/Throat:      Mouth: Mucous membranes are moist.     Eyes:      Comments: Bilateral mydriasis, scleral injection.     Cardiovascular:      Rate and Rhythm: Normal rate and regular rhythm.   Pulmonary:      Effort: No respiratory distress.      Breath sounds: No rales.   Abdominal:      Palpations: Abdomen is soft.      Tenderness: There is no abdominal tenderness. There is no guarding.     Musculoskeletal:      Right lower leg: No edema.      Left lower leg: No edema.     Skin:     General: Skin is warm and dry.     Neurological:      Comments: Rouses to voice, follows commands to open eyes but somnolent.       Neuro: Acute metabolic encephalopathy. Secondary to hypcapnia, other metabolic syndrome, or drug toxicity. Hold  home benzodiazepine, opioids. Delirium precautions. Holding home psychoactive medications due to concern for polypharmacy.  Cardiac: NSTEMI. Prolonged Qtc. Hx of PAH, HTN, HFpEF. POCUS exam indicative of fluid tolerance, will administer 500 ml bolus and monitor response. Sildenafil held while NPO. Holding home diuretics at this time. Continue heparin infusion, trend troponin. Limited echocardiogram ordered.  Pulmonary: Acute respiratory failure with hypoxia and hypercapnia. Pneumonia vs atelectasis. Hx of asthma. Continue bipap therapy overnight. Ordered scheduled duoneb therapy. Does not appear in acute exacerbation.   Gastrointestinal: No acute issues. NPO until able to pass bedside swallow evaluation. Continued home protonix as IV due ot recent history of gastritis.  Renal: Acute kidney injury. Rhabdomyolysis. Will proceed with IVF resuscitation via boluses. Monitor daily RFP, maintain bhatia for hourly I/Os at this time.   Endocrine: Hx of cushing disease? Hypoglycemia protocol while NPO.   Hematology/Rheum: Hx of SLE. Hx of DVT, no longer on anticoagulation. Consider home hydroxychloroquine, sulfasalazine when able to tolerate PO. Heparin infusion as above.  Infectious Disease: Sepsis secondary to pneumonia? Continue IV zosysn, renally dosed. Pharmacy to dose vancomycin.  Musculoskeletal: PT/OT on consult.    Lines/Tubes/Drains: PIV. Left chest port      Prophylaxis: Heparin infusion, SCDs    Disposition: ICU    ABCDEF Checklist  Analgesia: Spontaneous awakening trial to be pursued if clinically appropriate. RASS goal reviewed if applicable.  Breathing: Spontaneous breathing trial to be pursued if clinically appropriate. Mechanical power of assisted ventilation reviewed if applicable.  Choice of analgesia/sedation: Analgesic and sedative agents adjusted per clinical context.   Delirium assessed by CAM, will avoid exacerbating factors   Early mobility and exercise: Physical and occupational therapy engaged    Family: Plan of care, overall trajectory of patient shared with family. Questions elicited and answered as appropriate.       Due to the high probability of life threatening clinical decompensation, the patient required critical care time evaluating and managing this patient.  Critical care time included obtaining a history, examining the patient, ordering and reviewing studies, discussing, developing, and implementing a management plan, evaluating the patient's response to treatment, and discussion with other care team providers. I saw and evaluated the patient myself.  Critical care time was performed exclusive of billable procedures.    Critical care time: 50 minutes

## 2025-08-10 NOTE — CARE PLAN
The patient's goals for the shift include      The clinical goals for the shift include patient will be alert and oriented x 4    Over the shift, the patient did not make progress toward the following goals. Barriers to progression include infection and encephalopathy.  Recommendations to address these barriers include continue to treat infections.

## 2025-08-10 NOTE — CARE PLAN
The patient's goals for the shift include      The clinical goals for the shift include patient will be alert and oriented x 4

## 2025-08-10 NOTE — PROGRESS NOTES
Vancomycin Dosing by Pharmacy- FOLLOW UP    Emilee Dempsey is a 45 y.o. year old female who Pharmacy has been consulted for vancomycin dosing for Sepsis. Based on the patient's indication and renal status this patient is being dosed based on a goal AUC of 400-600.     Renal function is currently improving.    Current vancomycin dose: 2000 mg given  Once    Most recent random level: 6.6 mcg/mL    Visit Vitals  /82   Pulse 98   Temp 36.6 °C (97.9 °F) (Temporal)   Resp 23        Lab Results   Component Value Date    CREATININE 1.87 (H) 08/10/2025    CREATININE 2.51 (H) 2025    CREATININE 0.97 2025    CREATININE 0.84 2024        Patient weight is as follows:   Vitals:    08/10/25 0800   Weight: 115 kg (253 lb 1.4 oz)       Cultures:  No results found for the encounter in last 14 days.       I/O last 3 completed shifts:  In: 930.5 (8.1 mL/kg) [I.V.:330.5 (2.9 mL/kg); IV Piggyback:600]  Out: 4725 (41.2 mL/kg) [Urine:4725 (1.1 mL/kg/hr)]  Weight: 114.8 kg   I/O during current shift:  I/O this shift:  In: 1653.3 [I.V.:395; IV Piggyback:1258.3]  Out: 1725 [Urine:1725]    Temp (24hrs), Av °C (98.6 °F), Min:36.6 °C (97.9 °F), Max:37.5 °C (99.5 °F)      Assessment/Plan    The next level will be obtained on  at 1800. May be obtained sooner if clinically indicated.   Will continue to monitor renal function daily while on vancomycin and order serum creatinine at least every 48 hours if not already ordered.  Follow for continued vancomycin needs, clinical response, and signs/symptoms of toxicity.       Vikki Pompa RPh

## 2025-08-10 NOTE — H&P
Hospital Medicine History & Physical       Patient Name: Emilee Dempsey   YOB: 1980    Subjective:    Emilee Dempsey is a 45 y.o. female who presents to the hospital minimally responsive after being found by family. No family present at time of my exam. ED provider related that family went and checked on the patient this afternoon when she did not come out of her room today. She was found unresponsive. She does take rx opiates and benzos, got some Narcan and per report did perk up with this. She was hypoglycemic and given dextrose.  Also noted to have a respiratory acidosis on VBG and was severely hypoxic. She was placed on Airvo. She was started on Heparin ggt due to rising troponin. Neuroimaging was negative. We were called for admission to SDU. Of note, patient was recently treated for C diff.     Cannot obtain ROS due to patient condition    Past Medical History:  SLE on anifrolumab 300 mg iv infusions, SSZ 1000 mg TID and  mg daily   Pulmonary HTN on Opsumit   Chronic Hypoxic Respiratory Failure  HFpEF  CKD 2/2 lupus nephritis   HTN  HLD   Obesity   Asthma   ITP s/p splenectomy   Fibromyalgia   Migraine   MDD  Provoked DVT from OAC, completed DOAC X 6mo  VALDEZ on CPAP     Past Surgical History:  EGD  Splenectomy   Kidney biopsy     Social History[1]     Family History[2]      Objective:    /71   Pulse 96   Temp 36.8 °C (98.2 °F) (Temporal)   Resp 20   Wt 114 kg (251 lb)   SpO2 94%   BMI 47.43 kg/m²     Physical Exam:    GENERAL: obtunded  HEENT: normocephalic, atraumatic, sclera clear, two oval shaped bruises on her L neck that look like pressure injuries   CARDIAC: regular rate & rhythm, S1/S2  PULMONARY: CTA b/l, no respiratory distress, - wheezes  ABDOMEN: soft, non-tender, non-distended  MSK: no peripheral edema, no obvious deformity  NEURO: minimally responsive, responds to some noxious stimuli   SKIN: Warm and dry, no  lesions, no rashes.     Assessment/Plan:    Acute on Chronic Hypoxic and Hypercapnic Respiratory Failure  Acute Metabolic or Toxic Encephalopathy   NSTEMI   CHACHA, hx lupus nephritis   Lactic Acidosis   Possible Sepsis   Possible Unintentional or intentional overdose on benzos or opiates   Hypoglycemia  Acute Liver Injury  SLE on anifrolumab 300 mg iv infusions, SSZ 1000 mg TID and  mg daily   Pulmonary HTN on Opsumit   HFpEF  HTN  HLD   Obesity   Asthma   ITP s/p splenectomy   Fibromyalgia   Migraine   MDD  Provoked DVT from OAC, completed DOAC X 6mo  VALDEZ on CPAP       I was worried about patients level of alertness. I contacted Dr. Simms of the ICU who evaluated the patient with me. It was decided to try her on BIPAP for 2 hours and see if her mentation improves. If she can come off BIPAP at that time and is awake enough to protect airway we can admit to SDU. Otherwise will transfer to ICU. BIPAP and repeat ABG ordered. CT head negative. Incomplete response to Narcan. No obvious infectious source. Hold all sedatives. Keep NPO.   Cont empiric broad spectrum abx, blood cultures pending. Today should have completed her course of PO Vanc for c.diff, if she becomes awake enough will continue PO Vanc while she is on other abx.   Cont heparin ggt, consult Cardiology. Tele. ASA/statin when able.   IV fluids with dextrose. Urine Na. If not improving with fluids consult Nephrology. Did have large volume output with Garnett insertion per RN  Acute rise in LFTs. Check CK level. Tylenol level negative. Other ddx include hemodynamic insult or congestion from pulm htn and r heart failure. If not due to rhabdo and not improving will pursue other work up      DVT Prophylaxis: heparin ggt  Code Status: FULL CODE  Disposition: SDU vs ICU      Jefferson Gabriel, DO  Hospital Medicine         [1]   Social History  Tobacco Use    Smoking status: Never    Smokeless tobacco: Never   Vaping Use    Vaping status: Never Used   Substance Use  Topics    Alcohol use: Not Currently     Comment: rarely    Drug use: Never   [2]   Family History  Problem Relation Name Age of Onset    Heart failure Father      Stroke Father

## 2025-08-10 NOTE — PROGRESS NOTES
Emilee Dempsey is a 45 y.o. female on day 1 of admission presenting with Altered mental status, unspecified altered mental status type.    SUBJECTIVE     Patient evaluated this morning, she is still somnolent, nonverbal and opens eyes to voice    OBJECTIVE     Vitals:    08/10/25 0720 08/10/25 0730 08/10/25 0800 08/10/25 0900   BP:  141/78 133/74 (!) 159/92   BP Location:       Patient Position:       Pulse:  102 102 104   Resp:  22 22 22   Temp:   36.8 °C (98.2 °F)    TempSrc:   Temporal    SpO2: 96% 96% 96% 96%   Weight:       Height:          Results from last 7 days   Lab Units 08/10/25  0742 08/10/25  0443 08/09/25  1927   WBC AUTO x10*3/uL 23.3* 26.0* 19.6*   HEMOGLOBIN g/dL 16.6* 17.0* 14.3   HEMATOCRIT % 54.1* 54.9* 47.8*   PLATELETS AUTO x10*3/uL 262 273 279   NEUTROS PCT AUTO %  --   --  87.4   LYMPHO PCT MAN %  --  1.0  --    LYMPHS PCT AUTO %  --   --  3.0   MONO PCT MAN %  --  3.0  --    MONOS PCT AUTO %  --   --  6.2   EOSINO PCT MAN %  --  0.0  --    EOS PCT AUTO %  --   --  0.1     Results from last 7 days   Lab Units 08/10/25  0443 08/09/25  1727   SODIUM mmol/L 139 134*   POTASSIUM mmol/L 4.2 5.3   CHLORIDE mmol/L 101 95*   CO2 mmol/L 28 23   BUN mg/dL 21 20   CREATININE mg/dL 1.87* 2.51*   CALCIUM mg/dL 8.3* 8.8   PROTEIN TOTAL g/dL  --  8.7*   BILIRUBIN TOTAL mg/dL  --  0.4   ALK PHOS U/L  --  116*   ALT U/L  --  231*   AST U/L  --  303*   GLUCOSE mg/dL 115* 102*     24hr Min/Max:  Temp  Min: 36.8 °C (98.2 °F)  Max: 37.5 °C (99.5 °F)  Pulse  Min: 89  Max: 104  BP  Min: 111/76  Max: 159/92  Resp  Min: 13  Max: 23  SpO2  Min: 91 %  Max: 99 %  LDA:   Implantable Port 08/09/25 Left Chest Single lumen port (Active)   Placement Date/Time: 08/09/25 1700   Hand Hygiene Completed: Yes  Orientation: Left  Implantable Port Location: Chest  Port Type: Single lumen port   Number of days: 0       Urethral Catheter 16 Fr. (Active)   Placement Date/Time: 08/09/25 1958   Placed by: Devorah Trammell  Hand Hygiene  Completed: Yes  Tube Size (Fr.): 16 Fr.  Catheter Balloon Size: 10 mL  Urine Returned: Yes   Number of days: 0       Intake/Output Summary (Last 24 hours) at 8/10/2025 0911  Last data filed at 8/10/2025 0800  Gross per 24 hour   Intake 930.51 ml   Output 4825 ml   Net -3894.49 ml     All other labs and Imaging have been personally reviewed.     Scheduled Medications  Scheduled Medications[1]   Continuous Medications:   Continuous Medications[2]     Physical Exam:  General: Ill-appearing, appears lethargic  HEENT:  Normocephalic, atraumatic  Chest: Diminished breath sounds bilaterally  CV: Tachycardic, regular rhythm, no murmurs    Abdomen: Abdomen is soft, non-tender, appears mildly distended, BS +   Extremities:  No lower extremity edema or cyanosis.   Neurological:  Rouses to voice, follows commands to open eyes but somnolent.   Skin:  Warm and dry.    ASSESSMENT & PLAN    Emilee Dempsey is a 45 y.o. female with past medical history of SLE, lupus nephritis, CKD, HFpEF, HTN, HLD, pulmonary hypertension who presents to [place] ICU for acute respiratory failure with hypoxia and hypercapnia, acute encephalopathy, and rhabdomyolysis.    Daily Progress: Patient still remains somnolent, vitals stable.  BiPAP.  CT abdomen pelvis for further evaluation of concern of toxic megacolon.  Continue doxycycline, Zosyn and vancomycin    ASSESSMENT & PLAN  Neuro/Constitutional  #Acute metabolic encephalopathy  #History of depression/anxiety  -Suspect secondary to infection, drug interaction toxicity, hypercapnia  PLAN:  -Hold home tizanidine, benzos and opioids, Wellbutrin, trazodone  - Patient's daughter says that patient lives by herself and is alert and oriented x 3 at baseline, has recently been taking Diflucan and p.o. vancomycin  - Will continue to explore causes of her current mental status  -Monitor for ICU delirium, maintain sleep hygiene  -Avoid anticholinergics, benzodiazepines, sedatives. Avoid excessive  tubes/lines  -Patient requires no sedation or analgesia    Cardiovascular  #Elevated troponin, NSTEMI  #Prolonged QTc  #HFpEF  PLAN:  -Continue heparin drip  -Echocardiogram pending  -Cardiology consult  - Trend troponin  -Patient's home dose Bumex has been held, will consider restarting  -Maintain MAPs>65     Pulmonary  #Acute hypoxic and hypercapnic respiratory failure  #Pulmonary arterial hypertension  #History of asthma  PLAN:  - Continue BiPAP, scheduled DuoNebs  - CT PE negative for PE but concerning for infection  - Will continue to monitor and consider intubation    GI  #Recent C. difficile infection  #Elevated LFTs 2/2 ischemia  #Constipation  -Was on oral vancomycin from 7/28 to 8/8  PLAN:  -Patient recently completed 10-day oral vancomycin treatment  -Continue oral vancomycin when able for prophylaxis  -CT abdomen pelvis 8/10 shows moderate to large stool burden, negative for obstruction or findings suggestive of ileus    Renal  #CHACHA, likely prerenal  PLAN:  -Patient's status post 3L IV fluids with improvement of creatinine  -Continue to trend renal function with daily labs, monitor UOP  -Replete electrolytes as needed    Endocrine  -Monitor sugars with daily labs and POCT    Heme/Onc  #History of SLE  #History of DVT not on AC  PLAN:  -Continue home sulfasalazine and hydroxychloroquine when able  -Trend with daily labs  -Monitor for signs of overt bleeding    ID  #Sepsis secondary to potentially multiple sources of infection  PLAN:  -Sources of infection include Mediport, GI/C. difficile, pneumonia  -Pending blood and urine cultures, continue doxycycline, vancomycin, Zosyn    Skin/MSK  #Rhabdomyolysis  #RLE cellulitis  PLAN:  -Trend CK, initial CK 13,000  - Continue antibiotics as above  - PT/OT    ICU CHECK LIST  Antimicrobials: Vancomycin, Zosyn and doxycycline  Oxygen: BiPAP  Feeding: N.p.o. for now  Drips: Heparin  Fluids: D5 half-normal saline maintenance fluids  Analgesia:-  Sedation:-  VTE ppx: On  heparin drip  GI ppx:-  Glycemic control: Hypoglycemia protocol  Bowel care:-  Indwelling catheters: Garnett cath  Lines: 3 peripheral IVs and Mediport  Code Status: Full code    Richard Costello  PGY-2, Internal Medicine  Please SecureChat for any further questions  This is a preliminary note, please await attending attestation for final A/P          [1] doxycycline, 100 mg, intravenous, q12h  ipratropium-albuteroL, 3 mL, nebulization, TID  lactated Ringer's, 500 mL, intravenous, Once  nystatin, 1 Application, Topical, BID  [Transfer Hold] pantoprazole, 40 mg, intravenous, BID  piperacillin-tazobactam, 3.375 g, intravenous, q6h  vancomycin, 125 mg, oral, BID  [2] heparin, 0-4,000 Units/hr, Last Rate: 1,000 Units/hr (08/09/25 5143)

## 2025-08-11 ENCOUNTER — APPOINTMENT (OUTPATIENT)
Dept: CARDIOLOGY | Facility: HOSPITAL | Age: 45
End: 2025-08-11
Payer: COMMERCIAL

## 2025-08-11 ENCOUNTER — APPOINTMENT (OUTPATIENT)
Dept: NEUROLOGY | Facility: HOSPITAL | Age: 45
End: 2025-08-11
Payer: COMMERCIAL

## 2025-08-11 VITALS
DIASTOLIC BLOOD PRESSURE: 70 MMHG | OXYGEN SATURATION: 99 % | RESPIRATION RATE: 22 BRPM | TEMPERATURE: 97.5 F | HEIGHT: 61 IN | WEIGHT: 253.09 LBS | HEART RATE: 99 BPM | SYSTOLIC BLOOD PRESSURE: 149 MMHG | BODY MASS INDEX: 47.78 KG/M2

## 2025-08-11 LAB
ALBUMIN SERPL BCP-MCNC: 3.3 G/DL (ref 3.4–5)
ALBUMIN SERPL BCP-MCNC: 3.3 G/DL (ref 3.4–5)
ALP SERPL-CCNC: 112 U/L (ref 33–110)
ALT SERPL W P-5'-P-CCNC: 504 U/L (ref 7–45)
ANION GAP SERPL CALC-SCNC: 8 MMOL/L (ref 10–20)
AORTIC VALVE PEAK VELOCITY: 1.76 M/S
AST SERPL W P-5'-P-CCNC: 348 U/L (ref 9–39)
AV PEAK GRADIENT: 12 MMHG
AVA (PEAK VEL): 2.55 CM2
BASOPHILS # BLD AUTO: 0.13 X10*3/UL (ref 0–0.1)
BASOPHILS NFR BLD AUTO: 0.6 %
BILIRUB DIRECT SERPL-MCNC: 0.1 MG/DL (ref 0–0.3)
BILIRUB SERPL-MCNC: 0.5 MG/DL (ref 0–1.2)
BUN SERPL-MCNC: 15 MG/DL (ref 6–23)
C3 SERPL-MCNC: 132 MG/DL (ref 87–200)
C4 SERPL-MCNC: 26 MG/DL (ref 10–50)
CALCIUM SERPL-MCNC: 8.8 MG/DL (ref 8.6–10.3)
CHLORIDE SERPL-SCNC: 98 MMOL/L (ref 98–107)
CK SERPL-CCNC: 6916 U/L (ref 0–215)
CO2 SERPL-SCNC: 35 MMOL/L (ref 21–32)
CREAT SERPL-MCNC: 1.26 MG/DL (ref 0.5–1.05)
CRP SERPL-MCNC: 7.6 MG/DL
DSDNA AB SER-ACNC: <1 IU/ML
EGFRCR SERPLBLD CKD-EPI 2021: 54 ML/MIN/1.73M*2
EJECTION FRACTION APICAL 4 CHAMBER: 58.4
EJECTION FRACTION: 58 %
EOSINOPHIL # BLD AUTO: 0.12 X10*3/UL (ref 0–0.7)
EOSINOPHIL NFR BLD AUTO: 0.5 %
ERYTHROCYTE [DISTWIDTH] IN BLOOD BY AUTOMATED COUNT: 21.8 % (ref 11.5–14.5)
GLUCOSE BLD MANUAL STRIP-MCNC: 125 MG/DL (ref 74–99)
GLUCOSE BLD MANUAL STRIP-MCNC: 140 MG/DL (ref 74–99)
GLUCOSE BLD MANUAL STRIP-MCNC: 146 MG/DL (ref 74–99)
GLUCOSE BLD MANUAL STRIP-MCNC: 74 MG/DL (ref 74–99)
GLUCOSE BLD MANUAL STRIP-MCNC: 88 MG/DL (ref 74–99)
GLUCOSE BLD MANUAL STRIP-MCNC: 94 MG/DL (ref 74–99)
GLUCOSE SERPL-MCNC: 135 MG/DL (ref 74–99)
HCT VFR BLD AUTO: 51.9 % (ref 36–46)
HGB BLD-MCNC: 15.8 G/DL (ref 12–16)
HOLD SPECIMEN: NORMAL
HOLD SPECIMEN: NORMAL
IMM GRANULOCYTES # BLD AUTO: 0.27 X10*3/UL (ref 0–0.7)
IMM GRANULOCYTES NFR BLD AUTO: 1.2 % (ref 0–0.9)
LEFT VENTRICLE INTERNAL DIMENSION DIASTOLE: 3.74 CM (ref 3.5–6)
LEFT VENTRICULAR OUTFLOW TRACT DIAMETER: 2 CM
LYMPHOCYTES # BLD AUTO: 1.32 X10*3/UL (ref 1.2–4.8)
LYMPHOCYTES NFR BLD AUTO: 5.9 %
MCH RBC QN AUTO: 27.8 PG (ref 26–34)
MCHC RBC AUTO-ENTMCNC: 30.4 G/DL (ref 32–36)
MCV RBC AUTO: 91 FL (ref 80–100)
MITRAL VALVE E/A RATIO: 0.81
MONOCYTES # BLD AUTO: 2.22 X10*3/UL (ref 0.1–1)
MONOCYTES NFR BLD AUTO: 10 %
NEUTROPHILS # BLD AUTO: 18.24 X10*3/UL (ref 1.2–7.7)
NEUTROPHILS NFR BLD AUTO: 81.8 %
NRBC BLD-RTO: 3 /100 WBCS (ref 0–0)
PHOSPHATE SERPL-MCNC: 1.9 MG/DL (ref 2.5–4.9)
PLATELET # BLD AUTO: 303 X10*3/UL (ref 150–450)
POTASSIUM SERPL-SCNC: 2.9 MMOL/L (ref 3.5–5.3)
PROT SERPL-MCNC: 6.7 G/DL (ref 6.4–8.2)
RBC # BLD AUTO: 5.69 X10*6/UL (ref 4–5.2)
RIGHT VENTRICLE FREE WALL PEAK S': 17.8 CM/S
RIGHT VENTRICLE PEAK SYSTOLIC PRESSURE: 49 MMHG
SODIUM SERPL-SCNC: 138 MMOL/L (ref 136–145)
TRICUSPID ANNULAR PLANE SYSTOLIC EXCURSION: 2.9 CM
UFH PPP CHRO-ACNC: 0.2 IU/ML (ref ?–1.1)
UFH PPP CHRO-ACNC: 0.4 IU/ML (ref ?–1.1)
UFH PPP CHRO-ACNC: <0.1 IU/ML (ref ?–1.1)
VANCOMYCIN SERPL-MCNC: 14.4 UG/ML (ref 5–20)
WBC # BLD AUTO: 22.3 X10*3/UL (ref 4.4–11.3)

## 2025-08-11 PROCEDURE — 99291 CRITICAL CARE FIRST HOUR: CPT

## 2025-08-11 PROCEDURE — 95819 EEG AWAKE AND ASLEEP: CPT

## 2025-08-11 PROCEDURE — 84132 ASSAY OF SERUM POTASSIUM: CPT | Performed by: STUDENT IN AN ORGANIZED HEALTH CARE EDUCATION/TRAINING PROGRAM

## 2025-08-11 PROCEDURE — 2020000001 HC ICU ROOM DAILY

## 2025-08-11 PROCEDURE — 2500000004 HC RX 250 GENERAL PHARMACY W/ HCPCS (ALT 636 FOR OP/ED)

## 2025-08-11 PROCEDURE — 36415 COLL VENOUS BLD VENIPUNCTURE: CPT | Performed by: STUDENT IN AN ORGANIZED HEALTH CARE EDUCATION/TRAINING PROGRAM

## 2025-08-11 PROCEDURE — 86140 C-REACTIVE PROTEIN: CPT | Performed by: HOSPITALIST

## 2025-08-11 PROCEDURE — 85520 HEPARIN ASSAY: CPT | Performed by: HOSPITALIST

## 2025-08-11 PROCEDURE — 85520 HEPARIN ASSAY: CPT | Performed by: STUDENT IN AN ORGANIZED HEALTH CARE EDUCATION/TRAINING PROGRAM

## 2025-08-11 PROCEDURE — 85025 COMPLETE CBC W/AUTO DIFF WBC: CPT | Performed by: STUDENT IN AN ORGANIZED HEALTH CARE EDUCATION/TRAINING PROGRAM

## 2025-08-11 PROCEDURE — 99233 SBSQ HOSP IP/OBS HIGH 50: CPT | Performed by: INTERNAL MEDICINE

## 2025-08-11 PROCEDURE — 2500000004 HC RX 250 GENERAL PHARMACY W/ HCPCS (ALT 636 FOR OP/ED): Performed by: INTERNAL MEDICINE

## 2025-08-11 PROCEDURE — 86738 MYCOPLASMA ANTIBODY: CPT

## 2025-08-11 PROCEDURE — 36415 COLL VENOUS BLD VENIPUNCTURE: CPT | Performed by: INTERNAL MEDICINE

## 2025-08-11 PROCEDURE — 82947 ASSAY GLUCOSE BLOOD QUANT: CPT

## 2025-08-11 PROCEDURE — 2500000002 HC RX 250 W HCPCS SELF ADMINISTERED DRUGS (ALT 637 FOR MEDICARE OP, ALT 636 FOR OP/ED): Performed by: STUDENT IN AN ORGANIZED HEALTH CARE EDUCATION/TRAINING PROGRAM

## 2025-08-11 PROCEDURE — 2500000004 HC RX 250 GENERAL PHARMACY W/ HCPCS (ALT 636 FOR OP/ED): Mod: JW

## 2025-08-11 PROCEDURE — 87449 NOS EACH ORGANISM AG IA: CPT | Mod: PARLAB

## 2025-08-11 PROCEDURE — 94640 AIRWAY INHALATION TREATMENT: CPT

## 2025-08-11 PROCEDURE — 80202 ASSAY OF VANCOMYCIN: CPT | Performed by: INTERNAL MEDICINE

## 2025-08-11 PROCEDURE — 2500000004 HC RX 250 GENERAL PHARMACY W/ HCPCS (ALT 636 FOR OP/ED): Performed by: STUDENT IN AN ORGANIZED HEALTH CARE EDUCATION/TRAINING PROGRAM

## 2025-08-11 PROCEDURE — 95819 EEG AWAKE AND ASLEEP: CPT | Performed by: PSYCHIATRY & NEUROLOGY

## 2025-08-11 PROCEDURE — C8929 TTE W OR WO FOL WCON,DOPPLER: HCPCS

## 2025-08-11 PROCEDURE — 93306 TTE W/DOPPLER COMPLETE: CPT | Performed by: INTERNAL MEDICINE

## 2025-08-11 PROCEDURE — 82040 ASSAY OF SERUM ALBUMIN: CPT

## 2025-08-11 PROCEDURE — 82550 ASSAY OF CK (CPK): CPT

## 2025-08-11 RX ORDER — POTASSIUM CHLORIDE 1.5 G/1.58G
20 POWDER, FOR SOLUTION ORAL ONCE
Status: DISCONTINUED | OUTPATIENT
Start: 2025-08-11 | End: 2025-08-13

## 2025-08-11 RX ORDER — POTASSIUM CHLORIDE 14.9 MG/ML
20 INJECTION INTRAVENOUS
Status: COMPLETED | OUTPATIENT
Start: 2025-08-11 | End: 2025-08-11

## 2025-08-11 RX ORDER — VANCOMYCIN HYDROCHLORIDE 1.5 G/300ML
1500 INJECTION, SOLUTION INTRAVITREAL EVERY 12 HOURS
Status: DISCONTINUED | OUTPATIENT
Start: 2025-08-11 | End: 2025-08-12

## 2025-08-11 RX ORDER — DEXTROSE MONOHYDRATE AND SODIUM CHLORIDE 5; .45 G/100ML; G/100ML
125 INJECTION, SOLUTION INTRAVENOUS CONTINUOUS
Status: ACTIVE | OUTPATIENT
Start: 2025-08-11 | End: 2025-08-12

## 2025-08-11 RX ADMIN — Medication: at 18:51

## 2025-08-11 RX ADMIN — POTASSIUM CHLORIDE 20 MEQ: 14.9 INJECTION, SOLUTION INTRAVENOUS at 06:27

## 2025-08-11 RX ADMIN — POTASSIUM CHLORIDE 20 MEQ: 14.9 INJECTION, SOLUTION INTRAVENOUS at 08:25

## 2025-08-11 RX ADMIN — IPRATROPIUM BROMIDE AND ALBUTEROL SULFATE 3 ML: .5; 3 SOLUTION RESPIRATORY (INHALATION) at 07:35

## 2025-08-11 RX ADMIN — DOXYCYCLINE 100 MG: 100 INJECTION, POWDER, LYOPHILIZED, FOR SOLUTION INTRAVENOUS at 22:00

## 2025-08-11 RX ADMIN — NYSTATIN 1 APPLICATION: 100000 POWDER TOPICAL at 08:25

## 2025-08-11 RX ADMIN — Medication: at 20:00

## 2025-08-11 RX ADMIN — PERFLUTREN 2 ML OF DILUTION: 6.52 INJECTION, SUSPENSION INTRAVENOUS at 09:12

## 2025-08-11 RX ADMIN — HEPARIN SODIUM 1200 UNITS/HR: 10000 INJECTION, SOLUTION INTRAVENOUS at 19:31

## 2025-08-11 RX ADMIN — DEXTROSE AND SODIUM CHLORIDE 100 ML/HR: 5; 450 INJECTION, SOLUTION INTRAVENOUS at 08:18

## 2025-08-11 RX ADMIN — Medication: at 07:35

## 2025-08-11 RX ADMIN — PIPERACILLIN SODIUM AND TAZOBACTAM SODIUM 3.38 G: 3; .375 INJECTION, SOLUTION INTRAVENOUS at 10:42

## 2025-08-11 RX ADMIN — DEXTROSE AND SODIUM CHLORIDE 75 ML/HR: 5; .45 INJECTION, SOLUTION INTRAVENOUS at 12:28

## 2025-08-11 RX ADMIN — VANCOMYCIN HYDROCHLORIDE 1.5 G: 1.5 INJECTION, SOLUTION INTRAVITREAL at 14:11

## 2025-08-11 RX ADMIN — PIPERACILLIN SODIUM AND TAZOBACTAM SODIUM 3.38 G: 3; .375 INJECTION, SOLUTION INTRAVENOUS at 16:08

## 2025-08-11 RX ADMIN — PIPERACILLIN SODIUM AND TAZOBACTAM SODIUM 3.38 G: 3; .375 INJECTION, SOLUTION INTRAVENOUS at 05:23

## 2025-08-11 RX ADMIN — NYSTATIN 1 APPLICATION: 100000 POWDER TOPICAL at 20:53

## 2025-08-11 RX ADMIN — DOXYCYCLINE 100 MG: 100 INJECTION, POWDER, LYOPHILIZED, FOR SOLUTION INTRAVENOUS at 10:43

## 2025-08-11 RX ADMIN — Medication: at 18:57

## 2025-08-11 RX ADMIN — IPRATROPIUM BROMIDE AND ALBUTEROL SULFATE 3 ML: .5; 3 SOLUTION RESPIRATORY (INHALATION) at 18:51

## 2025-08-11 RX ADMIN — DEXTROSE AND SODIUM CHLORIDE 75 ML/HR: 5; .45 INJECTION, SOLUTION INTRAVENOUS at 22:28

## 2025-08-11 RX ADMIN — DOXYCYCLINE 100 MG: 100 INJECTION, POWDER, LYOPHILIZED, FOR SOLUTION INTRAVENOUS at 01:20

## 2025-08-11 RX ADMIN — PIPERACILLIN SODIUM AND TAZOBACTAM SODIUM 3.38 G: 3; .375 INJECTION, SOLUTION INTRAVENOUS at 20:53

## 2025-08-11 RX ADMIN — PIPERACILLIN SODIUM AND TAZOBACTAM SODIUM 3.38 G: 3; .375 INJECTION, SOLUTION INTRAVENOUS at 00:21

## 2025-08-11 ASSESSMENT — PAIN - FUNCTIONAL ASSESSMENT
PAIN_FUNCTIONAL_ASSESSMENT: CPOT (CRITICAL CARE PAIN OBSERVATION TOOL)

## 2025-08-11 NOTE — CARE PLAN
The patient's goals for the shift include  sustain safety    The clinical goals for the shift include patient will remain safe and free of falls    Over the shift, the patient did not make progress toward the following goals. Barriers to progression include none at this time. Recommendations to address these barriers include continue plan of care.

## 2025-08-11 NOTE — PROGRESS NOTES
Emilee Dempsey is a 45 y.o. female on day 2 of admission presenting with Altered mental status, unspecified altered mental status type.    SUBJECTIVE     Patient evaluated this morning, she is still somnolent, but appears improved and slowly follows commands    OBJECTIVE     Vitals:    08/11/25 0800 08/11/25 0900 08/11/25 1000 08/11/25 1100   BP: 144/84 147/67 134/64 147/70   BP Location: Left arm      Patient Position: Lying      Pulse: 86 85 85 74   Resp: 22 22 25 24   Temp: 37.1 °C (98.8 °F)      TempSrc: Temporal      SpO2: 95% 94% 93% 94%   Weight: 114 kg (251 lb 5.2 oz)      Height:          Results from last 7 days   Lab Units 08/11/25  0457   WBC AUTO x10*3/uL 22.3*   HEMOGLOBIN g/dL 15.8   HEMATOCRIT % 51.9*   PLATELETS AUTO x10*3/uL 303   NEUTROS PCT AUTO % 81.8   LYMPHS PCT AUTO % 5.9   MONOS PCT AUTO % 10.0   EOS PCT AUTO % 0.5     Results from last 7 days   Lab Units 08/11/25  0457   SODIUM mmol/L 138   POTASSIUM mmol/L 2.9*   CHLORIDE mmol/L 98   CO2 mmol/L 35*   BUN mg/dL 15   CREATININE mg/dL 1.26*   CALCIUM mg/dL 8.8   PROTEIN TOTAL g/dL 6.7   BILIRUBIN TOTAL mg/dL 0.5   ALK PHOS U/L 112*   ALT U/L 504*   AST U/L 348*   GLUCOSE mg/dL 135*     24hr Min/Max:  Temp  Min: 36.4 °C (97.5 °F)  Max: 37.4 °C (99.3 °F)  Pulse  Min: 74  Max: 105  BP  Min: 134/64  Max: 175/82  Resp  Min: 15  Max: 26  SpO2  Min: 78 %  Max: 100 %  LDA:   Implantable Port 08/09/25 Left Chest Single lumen port (Active)   Placement Date/Time: 08/09/25 1700   Hand Hygiene Completed: Yes  Orientation: Left  Implantable Port Location: Chest  Port Type: Single lumen port   Number of days: 0       Urethral Catheter 16 Fr. (Active)   Placement Date/Time: 08/09/25 1958   Placed by: Devorah Trammell  Hand Hygiene Completed: Yes  Tube Size (Fr.): 16 Fr.  Catheter Balloon Size: 10 mL  Urine Returned: Yes   Number of days: 0       Intake/Output Summary (Last 24 hours) at 8/11/2025 1200  Last data filed at 8/11/2025 1150  Gross per 24 hour    Intake 4373.8 ml   Output 3950 ml   Net 423.8 ml     All other labs and Imaging have been personally reviewed.     Scheduled Medications  Scheduled Medications[1]   Continuous Medications:   Continuous Medications[2]     Physical Exam:  General: Ill-appearing, appears lethargic but improved from yesterday  HEENT:  Normocephalic, atraumatic  Chest: Diminished breath sounds bilaterally  CV: Tachycardic, regular rhythm, no murmurs    Abdomen: Abdomen is soft, non-tender, nondistended, BS +   Extremities:  No lower extremity edema or cyanosis.   Neurological:  Rouses to voice, follows commands   Skin:  Warm and dry.    ASSESSMENT & PLAN    Emilee Dempsey is a 45 y.o. female with past medical history of SLE, lupus nephritis, CKD, HFpEF, HTN, HLD, pulmonary hypertension who presents to [facility] ICU for acute respiratory failure with hypoxia and hypercapnia, acute encephalopathy, and rhabdomyolysis.    Daily Progress:   8/10- Patient still remains somnolent, vitals stable.  BiPAP.  CT abdomen pelvis for further evaluation of concern of toxic megacolon.  Continue doxycycline, Zosyn and vancomycin    8/11-patient with appears improved from neurological standpoint, still appears lethargic.  Will check TSH, EEG, MRI when able.  Discontinued BiPAP for now.  Vitals stable.  CK downtrending.    ASSESSMENT & PLAN  Neuro/Constitutional  #Acute metabolic encephalopathy  #History of depression/anxiety  -Suspect secondary to infection, drug interaction toxicity, hypercapnia  PLAN:  -Hold home tizanidine, benzos and opioids, Wellbutrin, trazodone  - Patient's daughter says that patient lives by herself and is alert and oriented x 3 at baseline, has recently been taking Diflucan and p.o. vancomycin  - Will continue to explore of her encephalopathy.  Of her current mental status  -Monitor for ICU delirium, maintain sleep hygiene  -Avoid anticholinergics, benzodiazepines, sedatives. Avoid excessive tubes/lines  -Patient requires no  sedation or analgesia    Cardiovascular  #Elevated troponin, NSTEMI  #Prolonged QTc  #HFpEF  PLAN:  -Continue heparin drip  - Echocardiogram this admission shows EF 55 to 60%  -Cardiology consult  -Patient's home dose Bumex has been held, will consider restarting  -Maintain MAPs>65     Pulmonary  #Acute hypoxic and hypercapnic respiratory failure-appears resolved  #Pulmonary arterial hypertension  #History of asthma  PLAN:  - Wean supplemental oxygen, scheduled DuoNebs  - CT PE negative for PE but concerning for infection  - Will continue to monitor and consider intubation    GI  #Recent C. difficile infection  #Elevated LFTs 2/2 ischemia  #Constipation  -Was on oral vancomycin from 7/28 to 8/8  PLAN:  -Patient recently completed 10-day oral vancomycin treatment  -Continue oral vancomycin when able for prophylaxis  -CT abdomen pelvis 8/10 shows moderate to large stool burden, negative for obstruction or findings suggestive of ileus    Renal  #CHACHA, likely prerenal  PLAN:  -Patient's status post 3L IV fluids with improvement of creatinine  -Continue to trend renal function with daily labs, monitor UOP  -Replete electrolytes as needed    Endocrine  -Monitor sugars with daily labs and POCT    Heme/Onc  #History of SLE  #History of DVT not on AC  PLAN:  -Continue home sulfasalazine and hydroxychloroquine when able  -Trend with daily labs  -Monitor for signs of overt bleeding    ID  #Sepsis secondary to potentially multiple sources of infection  PLAN:  -Sources of infection include Mediport, GI/C. difficile, pneumonia  -Pending blood and urine cultures, continue doxycycline, vancomycin, Zosyn    Skin/MSK  #Rhabdomyolysis  #RLE cellulitis  PLAN:  -Trend CK, initial CK 13,000  - Continue antibiotics as above  - PT/OT    ICU CHECK LIST  Antimicrobials: Vancomycin, Zosyn and doxycycline  Oxygen: -  Feeding: N.p.o. for now  Drips: Heparin  Fluids: D5 half-normal saline maintenance fluids  Analgesia:-  Sedation:-  VTE ppx: On  heparin drip  GI ppx:-  Glycemic control: Hypoglycemia protocol  Bowel care:-  Indwelling catheters: Garnett cath  Lines: 3 peripheral IVs and Mediport  Code Status: Full code    Richard Costello  PGY-2, Internal Medicine  Please SecureChat for any further questions  This is a preliminary note, please await attending attestation for final A/P        [1] doxycycline, 100 mg, intravenous, q12h  ipratropium-albuteroL, 3 mL, nebulization, TID  nystatin, 1 Application, Topical, BID  [Transfer Hold] pantoprazole, 40 mg, intravenous, BID  perflutren protein A microsphere, 0.5 mL, intravenous, Once in imaging  piperacillin-tazobactam, 3.375 g, intravenous, q6h  potassium chloride, 20 mEq, oral, Once  sulfur hexafluoride microsphr, 2 mL, intravenous, Once in imaging  vancomycin, 125 mg, oral, BID     [2] dextrose 5%-0.45 % sodium chloride, 100 mL/hr, Last Rate: 100 mL/hr (08/11/25 0818)  heparin, 0-4,000 Units/hr, Last Rate: 1,200 Units/hr (08/11/25 0654)

## 2025-08-11 NOTE — CARE PLAN
The patient's goals for the shift include      The clinical goals for the shift include pt will remain hemodynamically stable throughout shift      Problem: Pain - Adult  Goal: Verbalizes/displays adequate comfort level or baseline comfort level  Outcome: Progressing     Problem: Safety - Adult  Goal: Free from fall injury  Outcome: Progressing     Problem: Discharge Planning  Goal: Discharge to home or other facility with appropriate resources  Outcome: Progressing     Problem: Chronic Conditions and Co-morbidities  Goal: Patient's chronic conditions and co-morbidity symptoms are monitored and maintained or improved  Outcome: Progressing     Problem: Nutrition  Goal: Nutrient intake appropriate for maintaining nutritional needs  Outcome: Progressing     Problem: Skin  Goal: Decreased wound size/increased tissue granulation at next dressing change  Outcome: Progressing  Flowsheets (Taken 8/11/2025 0925)  Decreased wound size/increased tissue granulation at next dressing change: Promote sleep for wound healing  Goal: Participates in plan/prevention/treatment measures  Outcome: Progressing  Flowsheets (Taken 8/11/2025 0925)  Participates in plan/prevention/treatment measures: Elevate heels  Goal: Prevent/manage excess moisture  Outcome: Progressing  Flowsheets (Taken 8/11/2025 0925)  Prevent/manage excess moisture: Cleanse incontinence/protect with barrier cream  Goal: Prevent/minimize sheer/friction injuries  Outcome: Progressing  Flowsheets (Taken 8/11/2025 0925)  Prevent/minimize sheer/friction injuries: Complete micro-shifts as needed if patient unable. Adjust patient position to relieve pressure points, not a full turn  Goal: Promote/optimize nutrition  Outcome: Progressing  Flowsheets (Taken 8/11/2025 0925)  Promote/optimize nutrition: Monitor/record intake including meals  Goal: Promote skin healing  Outcome: Progressing  Flowsheets (Taken 8/11/2025 0925)  Promote skin healing: Assess skin/pad under  line(s)/device(s)

## 2025-08-11 NOTE — PROGRESS NOTES
Occupational Therapy                 Therapy Communication Note    Patient Name: Emilee Dempsey  MRN: [id number]  Department: [facility]  Room: [address] [place], [place] [address]  Today's Date: 8/11/2025     Discipline: Occupational Therapy    Missed Visit: OT Missed Visit: Yes     Missed Visit Reason: Missed Visit Reason:  (pt. not following commands, EEG pending)    Missed Time: Attempt    Comment:

## 2025-08-11 NOTE — PROGRESS NOTES
Subjective Data:  Extubated    Overnight Events:    Not on pressors     Objective Data:  Last Recorded Vitals:  Vitals:    08/11/25 0800 08/11/25 0900 08/11/25 1000 08/11/25 1100   BP: 144/84 147/67 134/64 147/70   BP Location: Left arm      Patient Position: Lying      Pulse: 86 85 85 74   Resp: 22 22 25 24   Temp: 37.1 °C (98.8 °F)      TempSrc: Temporal      SpO2: 95% 94% 93% 94%   Weight: 114 kg (251 lb 5.2 oz)      Height:           Last Labs:  CBC - 8/11/2025:  4:57 AM  22.3 15.8 303    51.9      CMP - 8/11/2025:  4:57 AM  8.8 6.7 348 --- 0.5   1.9 3.3; 3.3 504 112      PTT - 8/9/2025:  7:27 PM  _   _ 31     TROPHS   Date/Time Value Ref Range Status   08/10/2025 07:42  0 - 13 ng/L Final     Comment:     Previous result verified on 8/9/2025 1653 on specimen/case [id number] called with component TRPHS for procedure Troponin I, High Sensitivity, Initial with value 581 ng/L.   08/10/2025 01:38  0 - 13 ng/L Final     Comment:     Previous result verified on 8/9/2025 1653 on specimen/case [id number] called with component TRPHS for procedure Troponin I, High Sensitivity, Initial with value 581 ng/L.   08/09/2025 05:31  0 - 13 ng/L Final     Comment:     Previous result verified on 8/9/2025 1653 on specimen/case [id number] called with component TRPHS for procedure Troponin I, High Sensitivity, Initial with value 581 ng/L.     BNP   Date/Time Value Ref Range Status   10/08/2022 10:00 AM 22 0 - 99 pg/mL Final     Comment:     .  <100 pg/mL - Heart failure unlikely  100-299 pg/mL - Intermediate probability of acute heart  .               failure exacerbation. Correlate with clinical  .               context and patient history.    >=300 pg/mL - Heart Failure likely. Correlate with clinical  .               context and patient history.  BNP testing is performed using different testing   methodology at [facility] than at other   [facility]. Direct result comparisons  should   only be made within the same method.       HGBA1C   Date/Time Value Ref Range Status   02/03/2025 08:28 PM 5.2 4.3 - 5.6 % Final     Comment:     American Diabetes Association guidelines indicate that patients with HgbA1c in the range 5.7-6.4% are at increased risk for development of diabetes, and intervention by lifestyle modification may be beneficial. HgbA1c greater or equal to 6.5% is considered diagnostic of diabetes.   09/28/2022 11:59 AM 5.5 4.3 - 5.6 % Final     Comment:     American Diabetes Association guidelines indicate that patients with HgbA1c in the range 5.7-6.4% are at increased risk for development of diabetes, and intervention by lifestyle modification may be beneficial. HgbA1c greater or equal to 6.5% is considered diagnostic of diabetes.      Last I/O:  I/O last 3 completed shifts:  In: 3323.8 (29 mL/kg) [I.V.:725.5 (6.3 mL/kg); IV Piggyback:2598.3]  Out: 8670 (75.5 mL/kg) [Urine:8670 (2.1 mL/kg/hr)]  Weight: 114.8 kg     Past Cardiology Tests (Last 3 Years):  EKG:  ECG 12 Lead 06/14/2024    Echo:  Transthoracic Echo Complete 08/11/2025 LVEf = 55-60%, unremarkable valves      Transthoracic Echo Complete     Ejection Fractions:  EF   Date/Time Value Ref Range Status   08/11/2025 09:34 AM 58 %      Cath:  No results found for this or any previous visit from the past 1095 days.    Stress Test:  No results found for this or any previous visit from the past 1095 days.    Cardiac Imaging:  No results found for this or any previous visit from the past 1095 days.      Inpatient Medications:  Scheduled Medications[1]  PRN Medications[2]  Continuous Medications[3]    Physical Exam:  GEN: Obese 89 yo wf lying 45 degrees  HEENT: Atraumatic, normocephalic  COR: Reg  LUNGS; Clear anteriorly  EXT: Warm  Assessment/Plan   1.) Normal LV systolic  function on echo  2.) Respiratory failure improving  Agree with present plans  Peripheral IV 08/09/25 22 G Posterior;Left Hand (Active)   Site Assessment  Clean;Dry;Intact 08/11/25 0900   Dressing Status Clean;Dry;Occlusive 08/11/25 0900   Number of days: 2       Peripheral IV 08/10/25 20 G Anterior;Right Hand (Active)   Site Assessment Clean;Dry;Intact 08/11/25 0900   Dressing Status Clean;Dry;Occlusive 08/11/25 0900   Number of days: 1       Implantable Port 08/09/25 Left Chest Single lumen port (Active)   Line Necessity Intravenous fluid therapy 08/11/25 0900   Site Assessment Clean;Dry;Intact 08/11/25 0900   Dressing Status Clean;Dry;Occlusive 08/11/25 0900   Number of days: 2       Urethral Catheter 16 Fr. (Active)   Site Assessment Clean;Skin intact 08/11/25 1122   Number of days: 2       Code Status:  Full Code    I spent 30 minutes in the professional and overall care of this patient.        Ata Anna MD       [1]   Scheduled medications   Medication Dose Route Frequency    doxycycline  100 mg intravenous q12h    ipratropium-albuteroL  3 mL nebulization TID    nystatin  1 Application Topical BID    [Transfer Hold] pantoprazole  40 mg intravenous BID    perflutren protein A microsphere  0.5 mL intravenous Once in imaging    piperacillin-tazobactam  3.375 g intravenous q6h    potassium chloride  20 mEq oral Once    sulfur hexafluoride microsphr  2 mL intravenous Once in imaging    vancomycin  125 mg oral BID   [2]   PRN medications   Medication    albuterol    heparin    oxygen    vancomycin   [3]   Continuous Medications   Medication Dose Last Rate    dextrose 5%-0.45 % sodium chloride  100 mL/hr 100 mL/hr (08/11/25 0818)    heparin  0-4,000 Units/hr 1,200 Units/hr (08/11/25 0654)

## 2025-08-11 NOTE — PROGRESS NOTES
Physical Therapy                 Therapy Communication Note    Patient Name: Emilee Dempsey  MRN: [id number]  Department: [facility] NEURO  Room: 175/[facility]  Today's Date: 8/11/2025     Discipline: Physical Therapy    Missed Visit: PT Missed Visit: Yes     Missed Visit Reason: Missed Visit Reason:  (Hold per RN at this time, pt unable to follow commands, restless. RN reports to hold until tomorrow when pt is appropriate)    Missed Time: Attempt

## 2025-08-11 NOTE — PROGRESS NOTES
Vancomycin Dosing by Pharmacy- FOLLOW UP    Emilee Dempsey is a 45 y.o. year old female who Pharmacy has been consulted for vancomycin dosing for other unknown source. Based on the patient's indication and renal status this patient is being dosed based on a goal AUC of 400-600.     Renal function is currently improving.    Current vancomycin dose: 1500 mg given every 24 hours    Most recent random level: 14.4 mcg/mL    Visit Vitals  /75   Pulse 88   Temp 36.6 °C (97.9 °F) (Temporal)   Resp 22        Lab Results   Component Value Date    CREATININE 1.26 (H) 2025    CREATININE 1.87 (H) 08/10/2025    CREATININE 2.51 (H) 2025    CREATININE 0.97 2025        Patient weight is as follows:   Vitals:    25 0800   Weight: 114 kg (251 lb 5.2 oz)       Cultures:  No results found for the encounter in last 14 days.       I/O last 3 completed shifts:  In: 3323.8 (29 mL/kg) [I.V.:725.5 (6.3 mL/kg); IV Piggyback:2598.3]  Out: 8670 (75.5 mL/kg) [Urine:8670 (2.1 mL/kg/hr)]  Weight: 114.8 kg   I/O during current shift:  I/O this shift:  In: 2747.8 [I.V.:2397.8; IV Piggyback:350]  Out: 1000 [Urine:1000]    Temp (24hrs), Av.9 °C (98.4 °F), Min:36.4 °C (97.5 °F), Max:37.4 °C (99.3 °F)      Assessment/Plan    Within goal random/trough level. Will transition patient over to dosing by AUC with renal function improving.    This dosing regimen is predicted by [employer] to result in the following pharmacokinetic parameters:  Regimen: 1500 mg IV every 12 hours.  Start time: 21:50 on 2025  Exposure target: AUC24 (range) 400-600 mg/L.hr   [id number]: 558 mg/L.hr  AUC24,ss: 572 mg/L.hr  Probability of AUC24 > 400: >95 %  Ctrough,ss: 14.6 mg/L  Probability of Ctrough,ss > 20: %  The next level will be obtained on  at 0500. May be obtained sooner if clinically indicated.   Will continue to monitor renal function daily while on vancomycin and order serum creatinine at least every 48 hours if not already  ordered.  Follow for continued vancomycin needs, clinical response, and signs/symptoms of toxicity.       Galilea Foote

## 2025-08-12 LAB
ALBUMIN SERPL BCP-MCNC: 3.6 G/DL (ref 3.4–5)
ALBUMIN SERPL BCP-MCNC: 3.6 G/DL (ref 3.4–5)
ALP SERPL-CCNC: 113 U/L (ref 33–110)
ALT SERPL W P-5'-P-CCNC: 450 U/L (ref 7–45)
AMMONIA PLAS-SCNC: 42 UMOL/L (ref 16–53)
ANION GAP SERPL CALC-SCNC: 14 MMOL/L (ref 10–20)
AST SERPL W P-5'-P-CCNC: 211 U/L (ref 9–39)
BASOPHILS # BLD AUTO: 0.13 X10*3/UL (ref 0–0.1)
BASOPHILS NFR BLD AUTO: 0.8 %
BILIRUB DIRECT SERPL-MCNC: 0.1 MG/DL (ref 0–0.3)
BILIRUB SERPL-MCNC: 0.6 MG/DL (ref 0–1.2)
BUN SERPL-MCNC: 10 MG/DL (ref 6–23)
CALCIUM SERPL-MCNC: 8.9 MG/DL (ref 8.6–10.3)
CHLORIDE SERPL-SCNC: 95 MMOL/L (ref 98–107)
CO2 SERPL-SCNC: 31 MMOL/L (ref 21–32)
CREAT SERPL-MCNC: 1.1 MG/DL (ref 0.5–1.05)
CRP SERPL-MCNC: 4.21 MG/DL
EGFRCR SERPLBLD CKD-EPI 2021: 64 ML/MIN/1.73M*2
EOSINOPHIL # BLD AUTO: 0.03 X10*3/UL (ref 0–0.7)
EOSINOPHIL NFR BLD AUTO: 0.2 %
ERYTHROCYTE [DISTWIDTH] IN BLOOD BY AUTOMATED COUNT: 22.6 % (ref 11.5–14.5)
ERYTHROCYTE [SEDIMENTATION RATE] IN BLOOD BY WESTERGREN METHOD: 29 MM/H (ref 0–20)
GLUCOSE BLD MANUAL STRIP-MCNC: 101 MG/DL (ref 74–99)
GLUCOSE BLD MANUAL STRIP-MCNC: 112 MG/DL (ref 74–99)
GLUCOSE BLD MANUAL STRIP-MCNC: 116 MG/DL (ref 74–99)
GLUCOSE BLD MANUAL STRIP-MCNC: 56 MG/DL (ref 74–99)
GLUCOSE BLD MANUAL STRIP-MCNC: 93 MG/DL (ref 74–99)
GLUCOSE BLD MANUAL STRIP-MCNC: 99 MG/DL (ref 74–99)
GLUCOSE SERPL-MCNC: 130 MG/DL (ref 74–99)
HCT VFR BLD AUTO: 55.6 % (ref 36–46)
HGB BLD-MCNC: 16.8 G/DL (ref 12–16)
HOLD SPECIMEN: NORMAL
IMM GRANULOCYTES # BLD AUTO: 0.23 X10*3/UL (ref 0–0.7)
IMM GRANULOCYTES NFR BLD AUTO: 1.4 % (ref 0–0.9)
LEGIONELLA AG UR QL: NEGATIVE
LYMPHOCYTES # BLD AUTO: 1.8 X10*3/UL (ref 1.2–4.8)
LYMPHOCYTES NFR BLD AUTO: 10.6 %
MCH RBC QN AUTO: 28.4 PG (ref 26–34)
MCHC RBC AUTO-ENTMCNC: 30.2 G/DL (ref 32–36)
MCV RBC AUTO: 94 FL (ref 80–100)
MONOCYTES # BLD AUTO: 1.66 X10*3/UL (ref 0.1–1)
MONOCYTES NFR BLD AUTO: 9.8 %
NEUTROPHILS # BLD AUTO: 13.09 X10*3/UL (ref 1.2–7.7)
NEUTROPHILS NFR BLD AUTO: 77.2 %
NRBC BLD-RTO: 1.5 /100 WBCS (ref 0–0)
PHOSPHATE SERPL-MCNC: 2.5 MG/DL (ref 2.5–4.9)
PLATELET # BLD AUTO: 251 X10*3/UL (ref 150–450)
POTASSIUM SERPL-SCNC: 2.8 MMOL/L (ref 3.5–5.3)
PROT SERPL-MCNC: 7.3 G/DL (ref 6.4–8.2)
RBC # BLD AUTO: 5.92 X10*6/UL (ref 4–5.2)
SODIUM SERPL-SCNC: 137 MMOL/L (ref 136–145)
UFH PPP CHRO-ACNC: 0.2 IU/ML (ref ?–1.1)
VANCOMYCIN SERPL-MCNC: 37.6 UG/ML (ref 5–20)
WBC # BLD AUTO: 16.9 X10*3/UL (ref 4.4–11.3)

## 2025-08-12 PROCEDURE — 94640 AIRWAY INHALATION TREATMENT: CPT

## 2025-08-12 PROCEDURE — 2500000004 HC RX 250 GENERAL PHARMACY W/ HCPCS (ALT 636 FOR OP/ED): Performed by: INTERNAL MEDICINE

## 2025-08-12 PROCEDURE — 99233 SBSQ HOSP IP/OBS HIGH 50: CPT | Performed by: INTERNAL MEDICINE

## 2025-08-12 PROCEDURE — 2500000002 HC RX 250 W HCPCS SELF ADMINISTERED DRUGS (ALT 637 FOR MEDICARE OP, ALT 636 FOR OP/ED): Performed by: STUDENT IN AN ORGANIZED HEALTH CARE EDUCATION/TRAINING PROGRAM

## 2025-08-12 PROCEDURE — 85025 COMPLETE CBC W/AUTO DIFF WBC: CPT | Performed by: STUDENT IN AN ORGANIZED HEALTH CARE EDUCATION/TRAINING PROGRAM

## 2025-08-12 PROCEDURE — 36415 COLL VENOUS BLD VENIPUNCTURE: CPT | Performed by: STUDENT IN AN ORGANIZED HEALTH CARE EDUCATION/TRAINING PROGRAM

## 2025-08-12 PROCEDURE — 80053 COMPREHEN METABOLIC PANEL: CPT | Performed by: STUDENT IN AN ORGANIZED HEALTH CARE EDUCATION/TRAINING PROGRAM

## 2025-08-12 PROCEDURE — 87899 AGENT NOS ASSAY W/OPTIC: CPT | Mod: PARLAB | Performed by: INTERNAL MEDICINE

## 2025-08-12 PROCEDURE — 80202 ASSAY OF VANCOMYCIN: CPT | Performed by: INTERNAL MEDICINE

## 2025-08-12 PROCEDURE — 82947 ASSAY GLUCOSE BLOOD QUANT: CPT

## 2025-08-12 PROCEDURE — 87476 LYME DIS DNA AMP PROBE: CPT

## 2025-08-12 PROCEDURE — 2020000001 HC ICU ROOM DAILY

## 2025-08-12 PROCEDURE — 86140 C-REACTIVE PROTEIN: CPT

## 2025-08-12 PROCEDURE — 85652 RBC SED RATE AUTOMATED: CPT

## 2025-08-12 PROCEDURE — 2500000004 HC RX 250 GENERAL PHARMACY W/ HCPCS (ALT 636 FOR OP/ED)

## 2025-08-12 PROCEDURE — 82040 ASSAY OF SERUM ALBUMIN: CPT

## 2025-08-12 PROCEDURE — 2500000005 HC RX 250 GENERAL PHARMACY W/O HCPCS

## 2025-08-12 PROCEDURE — 99291 CRITICAL CARE FIRST HOUR: CPT

## 2025-08-12 PROCEDURE — 85520 HEPARIN ASSAY: CPT | Performed by: STUDENT IN AN ORGANIZED HEALTH CARE EDUCATION/TRAINING PROGRAM

## 2025-08-12 PROCEDURE — 82140 ASSAY OF AMMONIA: CPT

## 2025-08-12 RX ORDER — DEXTROSE MONOHYDRATE AND SODIUM CHLORIDE 5; .45 G/100ML; G/100ML
125 INJECTION, SOLUTION INTRAVENOUS CONTINUOUS
Status: DISCONTINUED | OUTPATIENT
Start: 2025-08-12 | End: 2025-08-13

## 2025-08-12 RX ORDER — POTASSIUM CHLORIDE 14.9 MG/ML
20 INJECTION INTRAVENOUS
Status: COMPLETED | OUTPATIENT
Start: 2025-08-12 | End: 2025-08-12

## 2025-08-12 RX ORDER — DEXTROSE 50 % IN WATER (D50W) INTRAVENOUS SYRINGE
Status: DISPENSED
Start: 2025-08-12 | End: 2025-08-12

## 2025-08-12 RX ORDER — DEXTROSE 50 % IN WATER (D50W) INTRAVENOUS SYRINGE
25 ONCE
Status: COMPLETED | OUTPATIENT
Start: 2025-08-12 | End: 2025-08-12

## 2025-08-12 RX ORDER — HEPARIN SODIUM 5000 [USP'U]/ML
5000 INJECTION, SOLUTION INTRAVENOUS; SUBCUTANEOUS EVERY 8 HOURS
Status: DISCONTINUED | OUTPATIENT
Start: 2025-08-12 | End: 2025-08-13

## 2025-08-12 RX ADMIN — DOXYCYCLINE 100 MG: 100 INJECTION, POWDER, LYOPHILIZED, FOR SOLUTION INTRAVENOUS at 14:19

## 2025-08-12 RX ADMIN — POTASSIUM CHLORIDE 20 MEQ: 14.9 INJECTION, SOLUTION INTRAVENOUS at 11:50

## 2025-08-12 RX ADMIN — IPRATROPIUM BROMIDE AND ALBUTEROL SULFATE 3 ML: .5; 3 SOLUTION RESPIRATORY (INHALATION) at 07:31

## 2025-08-12 RX ADMIN — HEPARIN SODIUM 5000 UNITS: 5000 INJECTION, SOLUTION INTRAVENOUS; SUBCUTANEOUS at 17:41

## 2025-08-12 RX ADMIN — POTASSIUM CHLORIDE 20 MEQ: 14.9 INJECTION, SOLUTION INTRAVENOUS at 09:43

## 2025-08-12 RX ADMIN — PIPERACILLIN SODIUM AND TAZOBACTAM SODIUM 3.38 G: 3; .375 INJECTION, SOLUTION INTRAVENOUS at 17:41

## 2025-08-12 RX ADMIN — PIPERACILLIN SODIUM AND TAZOBACTAM SODIUM 3.38 G: 3; .375 INJECTION, SOLUTION INTRAVENOUS at 11:03

## 2025-08-12 RX ADMIN — PIPERACILLIN SODIUM AND TAZOBACTAM SODIUM 3.38 G: 3; .375 INJECTION, SOLUTION INTRAVENOUS at 03:09

## 2025-08-12 RX ADMIN — IPRATROPIUM BROMIDE AND ALBUTEROL SULFATE 3 ML: .5; 3 SOLUTION RESPIRATORY (INHALATION) at 19:10

## 2025-08-12 RX ADMIN — DEXTROSE MONOHYDRATE 25 G: 25 INJECTION, SOLUTION INTRAVENOUS at 08:15

## 2025-08-12 RX ADMIN — NYSTATIN 1 APPLICATION: 100000 POWDER TOPICAL at 20:59

## 2025-08-12 RX ADMIN — DEXTROSE AND SODIUM CHLORIDE 125 ML/HR: 5; .45 INJECTION, SOLUTION INTRAVENOUS at 11:10

## 2025-08-12 RX ADMIN — NYSTATIN 1 APPLICATION: 100000 POWDER TOPICAL at 09:14

## 2025-08-12 RX ADMIN — DOXYCYCLINE 100 MG: 100 INJECTION, POWDER, LYOPHILIZED, FOR SOLUTION INTRAVENOUS at 22:09

## 2025-08-12 RX ADMIN — VANCOMYCIN HYDROCHLORIDE 1.5 G: 1.5 INJECTION, SOLUTION INTRAVITREAL at 00:00

## 2025-08-12 RX ADMIN — IPRATROPIUM BROMIDE AND ALBUTEROL SULFATE 3 ML: .5; 3 SOLUTION RESPIRATORY (INHALATION) at 12:54

## 2025-08-12 RX ADMIN — PIPERACILLIN SODIUM AND TAZOBACTAM SODIUM 3.38 G: 3; .375 INJECTION, SOLUTION INTRAVENOUS at 21:00

## 2025-08-12 ASSESSMENT — COGNITIVE AND FUNCTIONAL STATUS - GENERAL
DRESSING REGULAR UPPER BODY CLOTHING: TOTAL
PERSONAL GROOMING: TOTAL
WALKING IN HOSPITAL ROOM: TOTAL
HELP NEEDED FOR BATHING: TOTAL
TURNING FROM BACK TO SIDE WHILE IN FLAT BAD: A LOT
DAILY ACTIVITIY SCORE: 6
EATING MEALS: TOTAL
MOBILITY SCORE: 8
CLIMB 3 TO 5 STEPS WITH RAILING: TOTAL
MOVING FROM LYING ON BACK TO SITTING ON SIDE OF FLAT BED WITH BEDRAILS: A LOT
STANDING UP FROM CHAIR USING ARMS: TOTAL
TOILETING: TOTAL
DRESSING REGULAR LOWER BODY CLOTHING: TOTAL
MOVING TO AND FROM BED TO CHAIR: TOTAL

## 2025-08-12 ASSESSMENT — PAIN - FUNCTIONAL ASSESSMENT
PAIN_FUNCTIONAL_ASSESSMENT: CPOT (CRITICAL CARE PAIN OBSERVATION TOOL)

## 2025-08-12 NOTE — CONSULTS
"Nutrition Initial Assessment:   Nutrition Assessment    Reason for Assessment: Admission nursing screening (MST=2 for unsure of weight loss)    Patient is a 45 y.o. female presenting with altered mental status; NSTEMI    Pmhx: SLE, CKD, CHF, HTN, HLD, lupus nephritis, rhabdomyolysis    Nutrition History:  Energy Intake: Poor < 50 % (NPO x3 days)  Pain affecting nutrition status: N/A  Food and Nutrient History: Pt somnolent, but noted to be improved from yesterday. Unable to provide any history at this time. No family present in room.  Per review of chart, pt is NPO x3 days. concern for toxic megacolon, positive C-DIff  Vitamin/Herbal Supplement Use: none noted;  D51/2 NS @ 125ml/hr (510kcals/day)       Anthropometrics:  Height: (!) 154.9 cm (5' 0.98\")   Weight: 110 kg (243 lb 9.7 oz)   BMI (Calculated): 46.05  IBW/kg (Dietitian Calculated): 47.7 kg  Percent of IBW: 230 %      Weight History:     Weight Change %:  Weight History / % Weight Change: 8/11/25 114kg, 8/10/25 115kg, 8/9/25 114kg, 1/10/25 109kg, 12/10/24 111kg, 7/12/24 109kg    (likely weight fluctuations related to fluid shifts)    Nutrition Focused Physical Exam Findings:    Subcutaneous Fat Loss:   Defer Subcutaneous Fat Loss Assessment: Defer all  Defer All Reason: not appropriate at this time  Muscle Wasting:  Defer Muscle Wasting Assessment: Defer all  Defer All Reason: not appropriate at this time  Edema:  Edema: +1 trace (non pitting)  Edema Location: 1+ generalized and BLE; non pitting BUE  Physical Findings:  Skin: Positive (sacrum pressure injury/excoriation; BLE red)    Nutrition Significant Labs:  CBC Trend:   Results from last 7 days   Lab Units 08/12/25  0526 08/11/25  0457 08/10/25  0742 08/10/25  0443   WBC AUTO x10*3/uL 16.9* 22.3* 23.3* 26.0*   RBC AUTO x10*6/uL 5.92* 5.69* 5.89* 5.94*   HEMOGLOBIN g/dL 16.8* 15.8 16.6* 17.0*   HEMATOCRIT % 55.6* 51.9* 54.1* 54.9*   MCV fL 94 91 92 92   PLATELETS AUTO x10*3/uL 251 303 262 273    , BMP " Trend:   Results from last 7 days   Lab Units 08/12/25  0526 08/11/25  0457 08/10/25  0443 08/09/25  1727   GLUCOSE mg/dL 130* 135* 115* 102*   CALCIUM mg/dL 8.9 8.8 8.3* 8.8   SODIUM mmol/L 137 138 139 134*   POTASSIUM mmol/L 2.8* 2.9* 4.2 5.3   CO2 mmol/L 31 35* 28 23   CHLORIDE mmol/L 95* 98 101 95*   BUN mg/dL 10 15 21 20   CREATININE mg/dL 1.10* 1.26* 1.87* 2.51*        Nutrition Specific Medications:  Reviewed     I/O:   Last BM Date:  (unknown);      Dietary Orders (From admission, onward)       Start     Ordered    08/10/25 0226  May Participate in Room Service With Assistance  ( ROOM SERVICE MAY PARTICIPATE WITH ASSISTANCE)  Once        Question:  .  Answer:  Yes    08/10/25 0225    08/10/25 0013  NPO Diet Except: Sips with meds; Effective now  Diet effective now        Question:  Except:  Answer:  Sips with meds    08/10/25 0015                     Estimated Needs:      Method for Estimating Needs: 1300-1430kcals (27-30kcals/kg IBW)     Method for Estimating 24 Hour Protein Needs: 57-67g (1.2-1.4g/kg IBW)     Method for Estimating 24 Hour Fluid Needs: 1 mL/kcal or as per MD  Patient on Order Fluid Restriction: No        Nutrition Diagnosis   Malnutrition Diagnosis  Patient has Malnutrition Diagnosis: No (UTD)    Nutrition Diagnosis  Patient has Nutrition Diagnosis: Yes  Diagnosis Status (1): New  Nutrition Diagnosis 1: Inadequate protein energy intake  Related to (1): altered mental status  As Evidenced by (1): NPO x3 days       Nutrition Interventions/Recommendations        Nutrition Recommendations:  Individualized Nutrition Prescription Provided for : NPO currently; advance as able to oral diet    Nutrition Interventions/Goals:        Education Documentation  N/A     Nutrition Monitoring and Evaluation   Additional Plans: monitor ability to start on oral diet    Body Weight: Body weight - Weight reduction from fluids, as needed    Electrolyte and Renal Panel: Electrolytes within normal limits, Sodium,  Potassium    Skin Finding: Promote intact skin - Promote skin integrity  Edema Finding: +1 Pitting edema  Criteria: reduce edema    Goal Status: New goal(s) identified    Time Spent (min): 45 minutes

## 2025-08-12 NOTE — PROGRESS NOTES
08/12/25 1334   Discharge Planning   Living Arrangements Parent   Support Systems Parent;Children   Assistance Needed independent   Type of Residence Private residence   Who is requesting discharge planning? Provider   Home or Post Acute Services None   Expected Discharge Disposition Home   Does the patient need discharge transport arranged? No   Intensity of Service   Intensity of Service 0-30 min     Spoke with patient's mother on the phone. Introduced self and role as care coordinator. Demographics verified. Patient PCP is Natalia at [facility]. Patient insurance is [employer]. Patient is independent with ADL's ecept for washing her hair. Patients mother washesh her hair. Patient can walk independently short distances but uses a w/c for long distances. Patient wears 2lt nc at baseline and has a CPAP machine. PT/OT pending when patient can follow commands. Care coordinators will follow for discharge planning.

## 2025-08-12 NOTE — PROGRESS NOTES
Vancomycin Dosing by Pharmacy- Cessation of Therapy    Consult to pharmacy for vancomycin dosing has been discontinued by the prescriber, pharmacy will sign off at this time.    Please call pharmacy if there are further questions or re-enter a consult if vancomycin is resumed.     Lei Contreras, [facility]

## 2025-08-12 NOTE — PROGRESS NOTES
Occupational Therapy                 Therapy Communication Note    Patient Name: Emilee Dempsey  MRN: [id number]  Department: [facility]  Room: [address] [place], [place] [address]  Today's Date: 8/12/2025     Discipline: Occupational Therapy    Missed Visit:   yes    Missed Visit Reason:  discussed with RN, pt not medically stable to participate, not following commands, lethargic    Missed Time: Attempt    Comment:

## 2025-08-12 NOTE — CARE PLAN
The patient's goals for the shift include      Problem: Skin  Goal: Decreased wound size/increased tissue granulation at next dressing change  Outcome: Progressing  Flowsheets (Taken 8/11/2025 2134)  Decreased wound size/increased tissue granulation at next dressing change:   Protective dressings over bony prominences   Promote sleep for wound healing  Goal: Participates in plan/prevention/treatment measures  Outcome: Progressing  Flowsheets (Taken 8/11/2025 2134)  Participates in plan/prevention/treatment measures: Elevate heels  Goal: Prevent/manage excess moisture  Outcome: Progressing  Flowsheets (Taken 8/11/2025 2134)  Prevent/manage excess moisture:   Cleanse incontinence/protect with barrier cream   Moisturize dry skin   Monitor for/manage infection if present  Goal: Prevent/minimize sheer/friction injuries  Outcome: Progressing  Flowsheets (Taken 8/11/2025 2134)  Prevent/minimize sheer/friction injuries:   Complete micro-shifts as needed if patient unable. Adjust patient position to relieve pressure points, not a full turn   Use pull sheet   HOB 30 degrees or less   Turn/reposition every 2 hours/use positioning/transfer devices  Goal: Promote/optimize nutrition  Outcome: Progressing  Flowsheets (Taken 8/11/2025 2134)  Promote/optimize nutrition:   Monitor/record intake including meals   Discuss with provider if NPO > 2 days  Goal: Promote skin healing  Outcome: Progressing  Flowsheets (Taken 8/11/2025 2134)  Promote skin healing:   Assess skin/pad under line(s)/device(s)   Protective dressings over bony prominences   Turn/reposition every 2 hours/use positioning/transfer devices     The clinical goals for the shift include pt will maintain SPO2 above 90%

## 2025-08-12 NOTE — CARE PLAN
Problem: Pain - Adult  Goal: Verbalizes/displays adequate comfort level or baseline comfort level  8/12/2025 1224 by Marlene Acuña RN  Outcome: Progressing  8/12/2025 1220 by Marlene Acuña RN  Outcome: Progressing     Problem: Safety - Adult  Goal: Free from fall injury  8/12/2025 1224 by Marlene Acuña RN  Outcome: Progressing  8/12/2025 1220 by Marlene Acuña RN  Outcome: Progressing     Problem: Discharge Planning  Goal: Discharge to home or other facility with appropriate resources  8/12/2025 1224 by Marlene Acuña RN  Outcome: Progressing  8/12/2025 1220 by Marlene Acuña RN  Outcome: Progressing     Problem: Chronic Conditions and Co-morbidities  Goal: Patient's chronic conditions and co-morbidity symptoms are monitored and maintained or improved  8/12/2025 1224 by Marlene Acuña RN  Outcome: Progressing  8/12/2025 1220 by Marlene Acuña RN  Outcome: Progressing     Problem: Nutrition  Goal: Nutrient intake appropriate for maintaining nutritional needs  8/12/2025 1224 by Marlene Acuña RN  Outcome: Progressing  8/12/2025 1220 by Marlene Acuña RN  Outcome: Progressing     Problem: Skin  Goal: Decreased wound size/increased tissue granulation at next dressing change  8/12/2025 1224 by Marlene Acuña RN  Outcome: Progressing  Flowsheets (Taken 8/12/2025 1224)  Decreased wound size/increased tissue granulation at next dressing change: Promote sleep for wound healing  8/12/2025 1220 by Marlene Acuña RN  Outcome: Progressing  Goal: Participates in plan/prevention/treatment measures  8/12/2025 1224 by Marlene Acuña RN  Outcome: Progressing  Flowsheets (Taken 8/12/2025 1224)  Participates in plan/prevention/treatment measures: Elevate heels  8/12/2025 1220 by Marlene Acuña RN  Outcome: Progressing  Goal: Prevent/manage excess moisture  8/12/2025 1224 by Marlene Acuña RN  Outcome: Progressing  Flowsheets (Taken 8/12/2025 1224)  Prevent/manage excess moisture: Moisturize dry skin  8/12/2025 1220 by Marlene Acuña RN  Outcome:  Progressing  Goal: Prevent/minimize sheer/friction injuries  8/12/2025 1224 by Marlene Acuña RN  Outcome: Progressing  Flowsheets (Taken 8/12/2025 1224)  Prevent/minimize sheer/friction injuries: HOB 30 degrees or less  8/12/2025 1220 by Marlene Acuña RN  Outcome: Progressing  Goal: Promote/optimize nutrition  8/12/2025 1224 by Marlene Acuña RN  Outcome: Progressing  Flowsheets (Taken 8/12/2025 1224)  Promote/optimize nutrition: Assist with feeding  8/12/2025 1220 by Marlene Acuña RN  Outcome: Progressing  Goal: Promote skin healing  8/12/2025 1224 by Marlene Acuña RN  Outcome: Progressing  Flowsheets (Taken 8/12/2025 1224)  Promote skin healing: Assess skin/pad under line(s)/device(s)  8/12/2025 1220 by Marlene Acuña RN  Outcome: Progressing     Problem: Fall/Injury  Goal: Not fall by end of shift  8/12/2025 1224 by Marlene Acuña RN  Outcome: Progressing  8/12/2025 1220 by Marlene Acuña RN  Outcome: Progressing  Goal: Be free from injury by end of the shift  8/12/2025 1224 by Marlene Acuña RN  Outcome: Progressing  8/12/2025 1220 by Marlene Acuña RN  Outcome: Progressing  Goal: Verbalize understanding of personal risk factors for fall in the hospital  8/12/2025 1224 by Marlene Acuña RN  Outcome: Progressing  8/12/2025 1220 by Marlene Acuña RN  Outcome: Progressing  Goal: Verbalize understanding of risk factor reduction measures to prevent injury from fall in the home  8/12/2025 1224 by Marlene Acuña RN  Outcome: Progressing  8/12/2025 1220 by Marlene Acuña RN  Outcome: Progressing  Goal: Use assistive devices by end of the shift  8/12/2025 1224 by Marlene Acuña RN  Outcome: Progressing  8/12/2025 1220 by Marlene Acuña RN  Outcome: Progressing  Goal: Pace activities to prevent fatigue by end of the shift  8/12/2025 1224 by Marlene Acuña RN  Outcome: Progressing  8/12/2025 1220 by Marlene Acuña, RN  Outcome: Progressing   The patient's goals for the shift include      The clinical goals for the shift include remain hds

## 2025-08-12 NOTE — PROGRESS NOTES
Subjective Data:  Lethargic oriented x1    Overnight Events:    None recorded     Objective Data:  Last Recorded Vitals:  Vitals:    08/12/25 1100 08/12/25 1200 08/12/25 1300 08/12/25 1400   BP: 129/60 130/60 119/60 120/66   BP Location:       Patient Position:       Pulse: 76 78 77 79   Resp: 26 24 24 (!) 28   Temp:       TempSrc:       SpO2: 93% 90% 92% 92%   Weight:       Height:           Last Labs:  CBC - 8/12/2025:  5:26 AM  16.9 16.8 251    55.6      CMP - 8/12/2025:  5:26 AM  8.9 7.3 211 --- 0.6   2.5 3.6; 3.6 450 113      PTT - 8/9/2025:  7:27 PM  _   _ 31     TROPHS   Date/Time Value Ref Range Status   08/10/2025 07:42  0 - 13 ng/L Final     Comment:     Previous result verified on 8/9/2025 1653 on specimen/case [id number] called with component TRPHS for procedure Troponin I, High Sensitivity, Initial with value 581 ng/L.   08/10/2025 01:38  0 - 13 ng/L Final     Comment:     Previous result verified on 8/9/2025 1653 on specimen/case [id number] called with component TRPHS for procedure Troponin I, High Sensitivity, Initial with value 581 ng/L.   08/09/2025 05:31  0 - 13 ng/L Final     Comment:     Previous result verified on 8/9/2025 1653 on specimen/case [id number] called with component TRPHS for procedure Troponin I, High Sensitivity, Initial with value 581 ng/L.     BNP   Date/Time Value Ref Range Status   10/08/2022 10:00 AM 22 0 - 99 pg/mL Final     Comment:     .  <100 pg/mL - Heart failure unlikely  100-299 pg/mL - Intermediate probability of acute heart  .               failure exacerbation. Correlate with clinical  .               context and patient history.    >=300 pg/mL - Heart Failure likely. Correlate with clinical  .               context and patient history.  BNP testing is performed using different testing   methodology at [facility] than at other   [facility] hospitals. Direct result comparisons should   only be made within the same method.        HGBA1C   Date/Time Value Ref Range Status   02/03/2025 08:28 PM 5.2 4.3 - 5.6 % Final     Comment:     American Diabetes Association guidelines indicate that patients with HgbA1c in the range 5.7-6.4% are at increased risk for development of diabetes, and intervention by lifestyle modification may be beneficial. HgbA1c greater or equal to 6.5% is considered diagnostic of diabetes.   09/28/2022 11:59 AM 5.5 4.3 - 5.6 % Final     Comment:     American Diabetes Association guidelines indicate that patients with HgbA1c in the range 5.7-6.4% are at increased risk for development of diabetes, and intervention by lifestyle modification may be beneficial. HgbA1c greater or equal to 6.5% is considered diagnostic of diabetes.      Last I/O:  I/O last 3 completed shifts:  In: 5816.8 (52.6 mL/kg) [I.V.:3876.8 (35.1 mL/kg); IV Piggyback:1940]  Out: 5865 (53.1 mL/kg) [Urine:5865 (1.5 mL/kg/hr)]  Weight: 110.5 kg     Past Cardiology Tests (Last 3 Years):  EKG:  ECG 12 lead 08/09/2025 (Preliminary)      ECG 12 Lead 06/14/2024    Echo:  Transthoracic Echo Complete 08/11/2025 LVEF= 55-60% with normal RV systolic function      Transthoracic Echo Complete     Ejection Fractions:  EF   Date/Time Value Ref Range Status   08/11/2025 09:34 AM 58 %      Cath:  No results found for this or any previous visit from the past 1095 days.    Stress Test:  No results found for this or any previous visit from the past 1095 days.    Cardiac Imaging:  No results found for this or any previous visit from the past 1095 days.      Inpatient Medications:  Scheduled Medications[1]  PRN Medications[2]  Continuous Medications[3]    Physical Exam:  GEN: Obese 44 yo wf lying 45 degrees, somnolent  HEENT: Atraumatic, normocephalic  COR: Reg  LUNGS: Clear anterior ly  EXT: Warm     Assessment/Plan   1.) Increased troponin likely demand ischemia with normal LV systolic function and stable hemodynamics  2.) Rhythm NSR  3.) Decreased mental status  Peripheral  IV 08/09/25 22 G Posterior;Left Hand (Active)   Site Assessment Clean;Dry;Intact 08/11/25 2000   Dressing Type Transparent 08/11/25 2000   Line Status Infusing 08/11/25 2000   Dressing Status Clean;Dry 08/11/25 2000   Number of days: 3       Implantable Port 08/09/25 Left Chest Single lumen port (Active)   Line Necessity Intravenous fluid therapy 08/12/25 1200   Site Assessment Clean;Dry;Intact 08/12/25 1200   Line Status (1) Capped 08/12/25 1200   Dressing Status Clean;Dry;Occlusive 08/12/25 1200   Number of days: 3       Urethral Catheter 16 Fr. (Active)   Reason for Continuing Urinary Catheterization accurate hourly measurement of urine volume in a critically ill patient that cannot be assessed by other volumes and urine collection strategies 08/12/25 1200   Number of days: 3       Code Status:  Full Code    I spent 30 minutes in the professional and overall care of this patient.        Ata Anna MD       [1]   Scheduled medications   Medication Dose Route Frequency    dextrose        doxycycline  100 mg intravenous q12h    heparin  5,000 Units subcutaneous q8h    ipratropium-albuteroL  3 mL nebulization TID    nystatin  1 Application Topical BID    [Transfer Hold] pantoprazole  40 mg intravenous BID    perflutren protein A microsphere  0.5 mL intravenous Once in imaging    piperacillin-tazobactam  3.375 g intravenous q6h    potassium chloride  20 mEq oral Once    sulfur hexafluoride microsphr  2 mL intravenous Once in imaging    vancomycin  125 mg oral BID   [2]   PRN medications   Medication    albuterol    dextrose    oxygen   [3]   Continuous Medications   Medication Dose Last Rate    dextrose 5%-0.45 % sodium chloride  125 mL/hr 125 mL/hr (08/12/25 1110)      numerical 0-10

## 2025-08-12 NOTE — CARE PLAN
The patient's goals for the shift include      The clinical goals for the shift include pt will maintain SPO2 above 90%    Problem: Pain - Adult  Goal: Verbalizes/displays adequate comfort level or baseline comfort level  Outcome: Progressing     Problem: Safety - Adult  Goal: Free from fall injury  Outcome: Progressing     Problem: Discharge Planning  Goal: Discharge to home or other facility with appropriate resources  Outcome: Progressing     Problem: Chronic Conditions and Co-morbidities  Goal: Patient's chronic conditions and co-morbidity symptoms are monitored and maintained or improved  Outcome: Progressing     Problem: Nutrition  Goal: Nutrient intake appropriate for maintaining nutritional needs  Outcome: Progressing     Problem: Skin  Goal: Decreased wound size/increased tissue granulation at next dressing change  Outcome: Progressing  Goal: Participates in plan/prevention/treatment measures  Outcome: Progressing  Goal: Prevent/manage excess moisture  Outcome: Progressing  Goal: Prevent/minimize sheer/friction injuries  Outcome: Progressing  Goal: Promote/optimize nutrition  Outcome: Progressing  Goal: Promote skin healing  Outcome: Progressing     Problem: Fall/Injury  Goal: Not fall by end of shift  Outcome: Progressing  Goal: Be free from injury by end of the shift  Outcome: Progressing  Goal: Verbalize understanding of personal risk factors for fall in the hospital  Outcome: Progressing  Goal: Verbalize understanding of risk factor reduction measures to prevent injury from fall in the home  Outcome: Progressing  Goal: Use assistive devices by end of the shift  Outcome: Progressing  Goal: Pace activities to prevent fatigue by end of the shift  Outcome: Progressing

## 2025-08-12 NOTE — PROGRESS NOTES
Physical Therapy                 Therapy Communication Note    Patient Name: Emilee Dempsey  MRN: [id number]  Department: [facility]  Room: 175/[facility]  Today's Date: 8/12/2025     Discipline: Physical Therapy    Missed Visit: PT Missed Visit: Yes     Missed Visit Reason: Missed Visit Reason:  (Hold per RN, pt not medically appropriate, lethargic and unable to follow commands)    Missed Time: Attempt

## 2025-08-12 NOTE — PROGRESS NOTES
Emilee Dempsey is a 45 y.o. female on day 3 of admission presenting with Altered mental status, unspecified altered mental status type.    SUBJECTIVE     Patient evaluated this morning, she is still somnolent, but appears improved.    OBJECTIVE     Vitals:    08/12/25 0700 08/12/25 0731 08/12/25 0800 08/12/25 0900   BP: 150/71  117/59    BP Location:       Patient Position:       Pulse: 72  83 77   Resp: (!) 27  23 (!) 37   Temp:       TempSrc:       SpO2: 96% 96% 92% 94%   Weight:       Height:          Results from last 7 days   Lab Units 08/12/25  0526   WBC AUTO x10*3/uL 16.9*   HEMOGLOBIN g/dL 16.8*   HEMATOCRIT % 55.6*   PLATELETS AUTO x10*3/uL 251   NEUTROS PCT AUTO % 77.2   LYMPHS PCT AUTO % 10.6   MONOS PCT AUTO % 9.8   EOS PCT AUTO % 0.2     Results from last 7 days   Lab Units 08/12/25  0526 08/11/25  0457   SODIUM mmol/L 137 138   POTASSIUM mmol/L 2.8* 2.9*   CHLORIDE mmol/L 95* 98   CO2 mmol/L 31 35*   BUN mg/dL 10 15   CREATININE mg/dL 1.10* 1.26*   CALCIUM mg/dL 8.9 8.8   PROTEIN TOTAL g/dL  --  6.7   BILIRUBIN TOTAL mg/dL  --  0.5   ALK PHOS U/L  --  112*   ALT U/L  --  504*   AST U/L  --  348*   GLUCOSE mg/dL 130* 135*     24hr Min/Max:  Temp  Min: 36 °C (96.8 °F)  Max: 36.8 °C (98.2 °F)  Pulse  Min: 66  Max: 90  BP  Min: 107/76  Max: 165/75  Resp  Min: 20  Max: 37  SpO2  Min: 89 %  Max: 97 %  LDA:   Implantable Port 08/09/25 Left Chest Single lumen port (Active)   Placement Date/Time: 08/09/25 1700   Hand Hygiene Completed: Yes  Orientation: Left  Implantable Port Location: Chest  Port Type: Single lumen port   Number of days: 0       Urethral Catheter 16 Fr. (Active)   Placement Date/Time: 08/09/25 1958   Placed by: Devorah Trammell  Hand Hygiene Completed: Yes  Tube Size (Fr.): 16 Fr.  Catheter Balloon Size: 10 mL  Urine Returned: Yes   Number of days: 0       Intake/Output Summary (Last 24 hours) at 8/12/2025 0917  Last data filed at 8/12/2025 0600  Gross per 24 hour   Intake 3112 ml   Output  3145 ml   Net -33 ml     All other labs and Imaging have been personally reviewed.     Scheduled Medications  Scheduled Medications[1]   Continuous Medications:   Continuous Medications[2]     Physical Exam:  General: Ill-appearing, appears lethargic but improved from yesterday  HEENT:  Normocephalic, atraumatic  Chest: Diminished breath sounds bilaterally  CV: regular rate and rhythm, no murmurs    Abdomen: Abdomen is soft, non-tender, nondistended, BS +   Extremities:  No lower extremity edema or cyanosis.   Neurological:  Rouses to voice, follows some commands   Skin:  Warm and dry.    ASSESSMENT & PLAN    Emilee Dempsey is a 45 y.o. female with past medical history of SLE, lupus nephritis, CKD, HFpEF, HTN, HLD, pulmonary hypertension who presents to [facility] ICU for acute respiratory failure with hypoxia and hypercapnia, acute encephalopathy, and rhabdomyolysis.    Daily Progress:   8/10- Patient still remains somnolent, vitals stable.  BiPAP.  CT abdomen pelvis for further evaluation of concern of toxic megacolon.  Continue doxycycline, Zosyn and vancomycin    8/11-patient with appears improved from neurological standpoint, still appears lethargic.  Will check TSH, EEG, MRI when able.  Discontinued BiPAP for now.  Vitals stable.  CK downtrending.    8/12-patient still lethargic appearing.  Encephalopathy workup so far unremarkable, will broaden differential including tickborne diseases and hepatic etiology.  No source of infection located at this time.  Labs continue to improve.  Will discontinue heparin and vancomycin today.    ASSESSMENT & PLAN  Neuro/Constitutional  #Acute metabolic encephalopathy  #History of depression/anxiety  -Suspect secondary to infection, drug interaction toxicity  PLAN:  -Hold home tizanidine, benzos and opioids, Wellbutrin, trazodone  - Patient's daughter says that patient lives with mom and is alert and oriented x 3 at baseline, has recently been taking Diflucan and p.o.  vancomycin  - Will continue to explore of her encephalopathy.  Of her current mental status  -Monitor for ICU delirium, maintain sleep hygiene  -Avoid anticholinergics, benzodiazepines, sedatives. Avoid excessive tubes/lines  -Patient requires no sedation or analgesia    Cardiovascular  #Elevated troponin, NSTEMI  #Prolonged QTc  #HFpEF  PLAN:  -Continue heparin drip  - Echocardiogram this admission shows EF 55 to 60%  -Cardiology consult  -Patient's home dose Bumex has been held, will consider restarting  -Maintain MAPs>65     Pulmonary  #Acute hypoxic and hypercapnic respiratory failure-appears resolved  #Pulmonary arterial hypertension  #History of asthma  PLAN:  - Wean supplemental oxygen, scheduled DuoNebs  - CT PE negative for PE but concerning for infection  - Will continue to monitor and consider intubation    GI  #Recent C. difficile infection  #Elevated LFTs 2/2 ischemia  #Constipation  -Was on oral vancomycin from 7/28 to 8/8  PLAN:  -Patient recently completed 10-day oral vancomycin treatment  -Continue oral vancomycin when able for prophylaxis  -CT abdomen pelvis 8/10 shows moderate to large stool burden, negative for obstruction or findings suggestive of ileus    Renal  #CHACHA, likely prerenal  PLAN:  -Patient's status post 3L IV fluids with improvement of creatinine  -Continue to trend renal function with daily labs, monitor UOP  -Replete electrolytes as needed    Endocrine  -Monitor sugars with daily labs and POCT    Heme/Onc  #History of SLE  #History of DVT not on AC  PLAN:  -Continue home sulfasalazine and hydroxychloroquine when able  -Trend with daily labs  -Monitor for signs of overt bleeding    ID  #Sepsis secondary to potentially multiple sources of infection  PLAN:  -Sources of infection include Mediport, GI/C. difficile, pneumonia  -Pending blood and urine cultures, continue doxycycline, vancomycin, Zosyn    Skin/MSK  #Rhabdomyolysis-resolved  #RLE cellulitis  PLAN:  -Trend CK, initial CK  13,000  - Continue antibiotics as above  - PT/OT    ICU CHECK LIST  Antimicrobials: Zosyn and doxycycline  Oxygen: -  Feeding: N.p.o. for now  Drips: Heparin  Fluids: D5 half-normal saline maintenance fluids  Analgesia:-  Sedation:-  VTE ppx: subcutaneous heparin  GI ppx:-  Glycemic control: Hypoglycemia protocol  Bowel care:-  Indwelling catheters: [place] cath  Lines: 3 peripheral IVs and Mediport  Code Status: Full code    Richard Costello  PGY-2, Internal Medicine  Please SecureChat for any further questions  This is a preliminary note, please await attending attestation for final A/P          [1] dextrose, , ,   doxycycline, 100 mg, intravenous, q12h  ipratropium-albuteroL, 3 mL, nebulization, TID  nystatin, 1 Application, Topical, BID  [Transfer Hold] pantoprazole, 40 mg, intravenous, BID  perflutren protein A microsphere, 0.5 mL, intravenous, Once in imaging  piperacillin-tazobactam, 3.375 g, intravenous, q6h  potassium chloride, 20 mEq, oral, Once  potassium chloride, 20 mEq, intravenous, q2h  sulfur hexafluoride microsphr, 2 mL, intravenous, Once in imaging  vancomycin, 125 mg, oral, BID  vancomycin, 1,500 mg, intravenous, q12h     [2] dextrose 5%-0.45 % sodium chloride, 125 mL/hr, Last Rate: 125 mL/hr (08/12/25 0817)  dextrose 5%-0.45 % sodium chloride, 125 mL/hr

## 2025-08-13 LAB
ALBUMIN SERPL BCP-MCNC: 3.4 G/DL (ref 3.4–5)
ALBUMIN SERPL BCP-MCNC: 3.4 G/DL (ref 3.4–5)
ALP SERPL-CCNC: 87 U/L (ref 33–110)
ALT SERPL W P-5'-P-CCNC: 231 U/L (ref 7–45)
ANION GAP SERPL CALC-SCNC: 12 MMOL/L (ref 10–20)
ANION GAP SERPL CALC-SCNC: 13 MMOL/L (ref 10–20)
AST SERPL W P-5'-P-CCNC: 77 U/L (ref 9–39)
ATRIAL RATE: 99 BPM
BACTERIA BLD CULT: NORMAL
BACTERIA BLD CULT: NORMAL
BASOPHILS # BLD AUTO: 0.1 X10*3/UL (ref 0–0.1)
BASOPHILS NFR BLD AUTO: 0.8 %
BILIRUB SERPL-MCNC: 0.5 MG/DL (ref 0–1.2)
BUN SERPL-MCNC: 11 MG/DL (ref 6–23)
BUN SERPL-MCNC: 13 MG/DL (ref 6–23)
CALCIUM SERPL-MCNC: 8.8 MG/DL (ref 8.6–10.3)
CALCIUM SERPL-MCNC: 8.9 MG/DL (ref 8.6–10.3)
CHLORIDE SERPL-SCNC: 100 MMOL/L (ref 98–107)
CHLORIDE SERPL-SCNC: 101 MMOL/L (ref 98–107)
CO2 SERPL-SCNC: 28 MMOL/L (ref 21–32)
CO2 SERPL-SCNC: 33 MMOL/L (ref 21–32)
CREAT SERPL-MCNC: 0.9 MG/DL (ref 0.5–1.05)
CREAT SERPL-MCNC: 1.13 MG/DL (ref 0.5–1.05)
EGFRCR SERPLBLD CKD-EPI 2021: 62 ML/MIN/1.73M*2
EGFRCR SERPLBLD CKD-EPI 2021: 82 ML/MIN/1.73M*2
EOSINOPHIL # BLD AUTO: 0.14 X10*3/UL (ref 0–0.7)
EOSINOPHIL NFR BLD AUTO: 1.1 %
ERYTHROCYTE [DISTWIDTH] IN BLOOD BY AUTOMATED COUNT: 21.9 % (ref 11.5–14.5)
GLUCOSE BLD MANUAL STRIP-MCNC: 104 MG/DL (ref 74–99)
GLUCOSE BLD MANUAL STRIP-MCNC: 112 MG/DL (ref 74–99)
GLUCOSE BLD MANUAL STRIP-MCNC: 80 MG/DL (ref 74–99)
GLUCOSE BLD MANUAL STRIP-MCNC: 85 MG/DL (ref 74–99)
GLUCOSE BLD MANUAL STRIP-MCNC: 85 MG/DL (ref 74–99)
GLUCOSE SERPL-MCNC: 105 MG/DL (ref 74–99)
GLUCOSE SERPL-MCNC: 94 MG/DL (ref 74–99)
HCT VFR BLD AUTO: 47 % (ref 36–46)
HGB BLD-MCNC: 14.8 G/DL (ref 12–16)
HOLD SPECIMEN: NORMAL
IMM GRANULOCYTES # BLD AUTO: 0.23 X10*3/UL (ref 0–0.7)
IMM GRANULOCYTES NFR BLD AUTO: 1.8 % (ref 0–0.9)
LYMPHOCYTES # BLD AUTO: 2.3 X10*3/UL (ref 1.2–4.8)
LYMPHOCYTES NFR BLD AUTO: 17.8 %
MAGNESIUM SERPL-MCNC: 1.68 MG/DL (ref 1.6–2.4)
MCH RBC QN AUTO: 28.7 PG (ref 26–34)
MCHC RBC AUTO-ENTMCNC: 31.5 G/DL (ref 32–36)
MCV RBC AUTO: 91 FL (ref 80–100)
MONOCYTES # BLD AUTO: 2.03 X10*3/UL (ref 0.1–1)
MONOCYTES NFR BLD AUTO: 15.7 %
NEUTROPHILS # BLD AUTO: 8.15 X10*3/UL (ref 1.2–7.7)
NEUTROPHILS NFR BLD AUTO: 62.8 %
NRBC BLD-RTO: 2 /100 WBCS (ref 0–0)
P AXIS: 69 DEGREES
P OFFSET: 191 MS
P ONSET: 134 MS
PHOSPHATE SERPL-MCNC: 3 MG/DL (ref 2.5–4.9)
PLATELET # BLD AUTO: 260 X10*3/UL (ref 150–450)
POTASSIUM SERPL-SCNC: 2.7 MMOL/L (ref 3.5–5.3)
POTASSIUM SERPL-SCNC: 3.1 MMOL/L (ref 3.5–5.3)
PR INTERVAL: 182 MS
PROCALCITONIN SERPL-MCNC: 0.5 NG/ML
PROT SERPL-MCNC: 6.7 G/DL (ref 6.4–8.2)
Q ONSET: 225 MS
QRS COUNT: 17 BEATS
QRS DURATION: 90 MS
QT INTERVAL: 438 MS
QTC CALCULATION(BAZETT): 562 MS
QTC FREDERICIA: 517 MS
R AXIS: 105 DEGREES
RBC # BLD AUTO: 5.16 X10*6/UL (ref 4–5.2)
S PNEUM AG UR QL: NEGATIVE
SODIUM SERPL-SCNC: 139 MMOL/L (ref 136–145)
SODIUM SERPL-SCNC: 142 MMOL/L (ref 136–145)
T AXIS: 44 DEGREES
T OFFSET: 444 MS
VENTRICULAR RATE: 99 BPM
WBC # BLD AUTO: 13 X10*3/UL (ref 4.4–11.3)

## 2025-08-13 PROCEDURE — 2500000001 HC RX 250 WO HCPCS SELF ADMINISTERED DRUGS (ALT 637 FOR MEDICARE OP)

## 2025-08-13 PROCEDURE — 99233 SBSQ HOSP IP/OBS HIGH 50: CPT | Performed by: INTERNAL MEDICINE

## 2025-08-13 PROCEDURE — 80069 RENAL FUNCTION PANEL: CPT | Mod: CCI | Performed by: STUDENT IN AN ORGANIZED HEALTH CARE EDUCATION/TRAINING PROGRAM

## 2025-08-13 PROCEDURE — 99233 SBSQ HOSP IP/OBS HIGH 50: CPT

## 2025-08-13 PROCEDURE — 2500000004 HC RX 250 GENERAL PHARMACY W/ HCPCS (ALT 636 FOR OP/ED)

## 2025-08-13 PROCEDURE — 76937 US GUIDE VASCULAR ACCESS: CPT

## 2025-08-13 PROCEDURE — 84145 PROCALCITONIN (PCT): CPT | Mod: PARLAB | Performed by: INTERNAL MEDICINE

## 2025-08-13 PROCEDURE — 97530 THERAPEUTIC ACTIVITIES: CPT | Mod: GP

## 2025-08-13 PROCEDURE — 1100000001 HC PRIVATE ROOM DAILY

## 2025-08-13 PROCEDURE — 36415 COLL VENOUS BLD VENIPUNCTURE: CPT | Performed by: STUDENT IN AN ORGANIZED HEALTH CARE EDUCATION/TRAINING PROGRAM

## 2025-08-13 PROCEDURE — 85025 COMPLETE CBC W/AUTO DIFF WBC: CPT | Performed by: STUDENT IN AN ORGANIZED HEALTH CARE EDUCATION/TRAINING PROGRAM

## 2025-08-13 PROCEDURE — 82947 ASSAY GLUCOSE BLOOD QUANT: CPT

## 2025-08-13 PROCEDURE — 2500000004 HC RX 250 GENERAL PHARMACY W/ HCPCS (ALT 636 FOR OP/ED): Performed by: INTERNAL MEDICINE

## 2025-08-13 PROCEDURE — 87081 CULTURE SCREEN ONLY: CPT | Mod: PARLAB

## 2025-08-13 PROCEDURE — 2500000005 HC RX 250 GENERAL PHARMACY W/O HCPCS

## 2025-08-13 PROCEDURE — 83735 ASSAY OF MAGNESIUM: CPT

## 2025-08-13 PROCEDURE — 94640 AIRWAY INHALATION TREATMENT: CPT

## 2025-08-13 PROCEDURE — 36415 COLL VENOUS BLD VENIPUNCTURE: CPT

## 2025-08-13 PROCEDURE — 2500000002 HC RX 250 W HCPCS SELF ADMINISTERED DRUGS (ALT 637 FOR MEDICARE OP, ALT 636 FOR OP/ED)

## 2025-08-13 PROCEDURE — 99255 IP/OBS CONSLTJ NEW/EST HI 80: CPT | Performed by: INTERNAL MEDICINE

## 2025-08-13 PROCEDURE — 97161 PT EVAL LOW COMPLEX 20 MIN: CPT | Mod: GP

## 2025-08-13 PROCEDURE — 97165 OT EVAL LOW COMPLEX 30 MIN: CPT | Mod: GO

## 2025-08-13 RX ORDER — POTASSIUM CHLORIDE 14.9 MG/ML
20 INJECTION INTRAVENOUS
Status: ACTIVE | OUTPATIENT
Start: 2025-08-13 | End: 2025-08-13

## 2025-08-13 RX ORDER — PANTOPRAZOLE SODIUM 20 MG/1
20 TABLET, DELAYED RELEASE ORAL 2 TIMES DAILY
Status: DISCONTINUED | OUTPATIENT
Start: 2025-08-13 | End: 2025-08-18 | Stop reason: HOSPADM

## 2025-08-13 RX ORDER — POTASSIUM CHLORIDE 14.9 MG/ML
20 INJECTION INTRAVENOUS
Status: DISCONTINUED | OUTPATIENT
Start: 2025-08-13 | End: 2025-08-13

## 2025-08-13 RX ORDER — ALBUTEROL SULFATE 0.83 MG/ML
2.5 SOLUTION RESPIRATORY (INHALATION) EVERY 2 HOUR PRN
Status: DISCONTINUED | OUTPATIENT
Start: 2025-08-13 | End: 2025-08-13

## 2025-08-13 RX ORDER — MONTELUKAST SODIUM 10 MG/1
10 TABLET ORAL NIGHTLY
Status: DISCONTINUED | OUTPATIENT
Start: 2025-08-13 | End: 2025-08-18 | Stop reason: HOSPADM

## 2025-08-13 RX ORDER — FLUTICASONE PROPIONATE 50 MCG
2 SPRAY, SUSPENSION (ML) NASAL 2 TIMES DAILY
Status: DISCONTINUED | OUTPATIENT
Start: 2025-08-13 | End: 2025-08-18 | Stop reason: HOSPADM

## 2025-08-13 RX ORDER — HEPARIN 100 UNIT/ML
500 SYRINGE INTRAVENOUS ONCE AS NEEDED
Status: DISCONTINUED | OUTPATIENT
Start: 2025-08-13 | End: 2025-08-18 | Stop reason: HOSPADM

## 2025-08-13 RX ORDER — ALBUTEROL SULFATE 1.25 MG/3ML
1.25 SOLUTION RESPIRATORY (INHALATION) EVERY 6 HOURS PRN
Status: DISCONTINUED | OUTPATIENT
Start: 2025-08-13 | End: 2025-08-13

## 2025-08-13 RX ORDER — HYDROXYCHLOROQUINE SULFATE 200 MG/1
200 TABLET, FILM COATED ORAL 2 TIMES DAILY
Status: DISCONTINUED | OUTPATIENT
Start: 2025-08-13 | End: 2025-08-18 | Stop reason: HOSPADM

## 2025-08-13 RX ORDER — HEPARIN SODIUM 5000 [USP'U]/ML
5000 INJECTION, SOLUTION INTRAVENOUS; SUBCUTANEOUS EVERY 8 HOURS
Status: DISCONTINUED | OUTPATIENT
Start: 2025-08-13 | End: 2025-08-18 | Stop reason: HOSPADM

## 2025-08-13 RX ORDER — LANOLIN ALCOHOL/MO/W.PET/CERES
400 CREAM (GRAM) TOPICAL 2 TIMES DAILY
Status: DISCONTINUED | OUTPATIENT
Start: 2025-08-13 | End: 2025-08-18 | Stop reason: HOSPADM

## 2025-08-13 RX ORDER — LEVALBUTEROL INHALATION SOLUTION 1.25 MG/3ML
1.25 SOLUTION RESPIRATORY (INHALATION)
Status: DISCONTINUED | OUTPATIENT
Start: 2025-08-13 | End: 2025-08-18 | Stop reason: HOSPADM

## 2025-08-13 RX ORDER — GABAPENTIN 400 MG/1
1200 CAPSULE ORAL NIGHTLY
Status: DISCONTINUED | OUTPATIENT
Start: 2025-08-13 | End: 2025-08-18 | Stop reason: HOSPADM

## 2025-08-13 RX ORDER — POTASSIUM CHLORIDE 1.5 G/1.58G
40 POWDER, FOR SOLUTION ORAL ONCE
Status: COMPLETED | OUTPATIENT
Start: 2025-08-13 | End: 2025-08-13

## 2025-08-13 RX ORDER — BUPROPION HYDROCHLORIDE 150 MG/1
300 TABLET ORAL EVERY MORNING
Status: DISCONTINUED | OUTPATIENT
Start: 2025-08-14 | End: 2025-08-18 | Stop reason: HOSPADM

## 2025-08-13 RX ORDER — POTASSIUM CHLORIDE 1.5 G/1.58G
20 POWDER, FOR SOLUTION ORAL ONCE
Status: COMPLETED | OUTPATIENT
Start: 2025-08-13 | End: 2025-08-13

## 2025-08-13 RX ORDER — DESVENLAFAXINE SUCCINATE 25 MG/1
100 TABLET, EXTENDED RELEASE ORAL DAILY
Status: DISCONTINUED | OUTPATIENT
Start: 2025-08-13 | End: 2025-08-18 | Stop reason: HOSPADM

## 2025-08-13 RX ORDER — POTASSIUM CHLORIDE 1.5 G/1.58G
40 POWDER, FOR SOLUTION ORAL 3 TIMES DAILY
Status: DISCONTINUED | OUTPATIENT
Start: 2025-08-13 | End: 2025-08-18 | Stop reason: HOSPADM

## 2025-08-13 RX ORDER — TALC
3 POWDER (GRAM) TOPICAL NIGHTLY
Status: DISCONTINUED | OUTPATIENT
Start: 2025-08-13 | End: 2025-08-18 | Stop reason: HOSPADM

## 2025-08-13 RX ORDER — LEVALBUTEROL INHALATION SOLUTION 1.25 MG/3ML
1.25 SOLUTION RESPIRATORY (INHALATION) EVERY 2 HOUR PRN
Status: DISCONTINUED | OUTPATIENT
Start: 2025-08-13 | End: 2025-08-18 | Stop reason: HOSPADM

## 2025-08-13 RX ORDER — HEPARIN SODIUM,PORCINE/PF 10 UNIT/ML
50 SYRINGE (ML) INTRAVENOUS AS NEEDED
Status: DISCONTINUED | OUTPATIENT
Start: 2025-08-13 | End: 2025-08-13

## 2025-08-13 RX ORDER — LOPERAMIDE HYDROCHLORIDE 2 MG/1
4 CAPSULE ORAL 3 TIMES DAILY PRN
Status: DISCONTINUED | OUTPATIENT
Start: 2025-08-13 | End: 2025-08-14

## 2025-08-13 RX ADMIN — HEPARIN SODIUM 5000 UNITS: 5000 INJECTION, SOLUTION INTRAVENOUS; SUBCUTANEOUS at 11:06

## 2025-08-13 RX ADMIN — LEVALBUTEROL HYDROCHLORIDE 1.25 MG: 1.25 SOLUTION RESPIRATORY (INHALATION) at 18:33

## 2025-08-13 RX ADMIN — Medication 3 MG: at 20:52

## 2025-08-13 RX ADMIN — NYSTATIN 1 APPLICATION: 100000 POWDER TOPICAL at 14:51

## 2025-08-13 RX ADMIN — HEPARIN SODIUM 5000 UNITS: 5000 INJECTION, SOLUTION INTRAVENOUS; SUBCUTANEOUS at 23:00

## 2025-08-13 RX ADMIN — NYSTATIN 1 APPLICATION: 100000 POWDER TOPICAL at 21:20

## 2025-08-13 RX ADMIN — PIPERACILLIN SODIUM AND TAZOBACTAM SODIUM 3.38 G: 3; .375 INJECTION, SOLUTION INTRAVENOUS at 11:06

## 2025-08-13 RX ADMIN — PIPERACILLIN SODIUM AND TAZOBACTAM SODIUM 3.38 G: 3; .375 INJECTION, SOLUTION INTRAVENOUS at 18:09

## 2025-08-13 RX ADMIN — MAGNESIUM OXIDE TAB 400 MG (241.3 MG ELEMENTAL MG) 1 TABLET: 400 (241.3 MG) TAB at 20:52

## 2025-08-13 RX ADMIN — POTASSIUM CHLORIDE 40 MEQ: 1.5 POWDER, FOR SOLUTION ORAL at 21:03

## 2025-08-13 RX ADMIN — HEPARIN SODIUM 5000 UNITS: 5000 INJECTION, SOLUTION INTRAVENOUS; SUBCUTANEOUS at 02:47

## 2025-08-13 RX ADMIN — POTASSIUM CHLORIDE 20 MEQ: 1.5 POWDER, FOR SOLUTION ORAL at 15:08

## 2025-08-13 RX ADMIN — HYDROXYCHLOROQUINE SULFATE 200 MG: 200 TABLET, FILM COATED ORAL at 20:52

## 2025-08-13 RX ADMIN — Medication: at 18:33

## 2025-08-13 RX ADMIN — PIPERACILLIN SODIUM AND TAZOBACTAM SODIUM 3.38 G: 3; .375 INJECTION, SOLUTION INTRAVENOUS at 03:07

## 2025-08-13 RX ADMIN — GABAPENTIN 1200 MG: 400 CAPSULE ORAL at 20:52

## 2025-08-13 RX ADMIN — POTASSIUM CHLORIDE 40 MEQ: 1.5 POWDER, FOR SOLUTION ORAL at 11:06

## 2025-08-13 RX ADMIN — MONTELUKAST 10 MG: 10 TABLET, FILM COATED ORAL at 20:52

## 2025-08-13 RX ADMIN — Medication: at 17:00

## 2025-08-13 ASSESSMENT — COGNITIVE AND FUNCTIONAL STATUS - GENERAL
TURNING FROM BACK TO SIDE WHILE IN FLAT BAD: A LOT
TURNING FROM BACK TO SIDE WHILE IN FLAT BAD: A LOT
DRESSING REGULAR UPPER BODY CLOTHING: TOTAL
MOVING FROM LYING ON BACK TO SITTING ON SIDE OF FLAT BED WITH BEDRAILS: A LOT
MOVING TO AND FROM BED TO CHAIR: TOTAL
DAILY ACTIVITIY SCORE: 11
STANDING UP FROM CHAIR USING ARMS: TOTAL
CLIMB 3 TO 5 STEPS WITH RAILING: TOTAL
MOBILITY SCORE: 8
DRESSING REGULAR LOWER BODY CLOTHING: TOTAL
HELP NEEDED FOR BATHING: TOTAL
CLIMB 3 TO 5 STEPS WITH RAILING: TOTAL
WALKING IN HOSPITAL ROOM: TOTAL
EATING MEALS: TOTAL
MOVING FROM LYING ON BACK TO SITTING ON SIDE OF FLAT BED WITH BEDRAILS: A LOT
PERSONAL GROOMING: TOTAL
DRESSING REGULAR UPPER BODY CLOTHING: A LOT
MOVING TO AND FROM BED TO CHAIR: TOTAL
STANDING UP FROM CHAIR USING ARMS: A LOT
DRESSING REGULAR LOWER BODY CLOTHING: TOTAL
DAILY ACTIVITIY SCORE: 6
HELP NEEDED FOR BATHING: TOTAL
TOILETING: TOTAL
EATING MEALS: A LITTLE
WALKING IN HOSPITAL ROOM: TOTAL
PERSONAL GROOMING: A LITTLE
TOILETING: TOTAL
MOBILITY SCORE: 9

## 2025-08-13 ASSESSMENT — PAIN - FUNCTIONAL ASSESSMENT
PAIN_FUNCTIONAL_ASSESSMENT: 0-10
PAIN_FUNCTIONAL_ASSESSMENT: CPOT (CRITICAL CARE PAIN OBSERVATION TOOL)
PAIN_FUNCTIONAL_ASSESSMENT: 0-10
PAIN_FUNCTIONAL_ASSESSMENT: 0-10
PAIN_FUNCTIONAL_ASSESSMENT: CPOT (CRITICAL CARE PAIN OBSERVATION TOOL)
PAIN_FUNCTIONAL_ASSESSMENT: 0-10
PAIN_FUNCTIONAL_ASSESSMENT: CPOT (CRITICAL CARE PAIN OBSERVATION TOOL)

## 2025-08-13 ASSESSMENT — PAIN SCALES - GENERAL
PAINLEVEL_OUTOF10: 7
PAINLEVEL_OUTOF10: 0 - NO PAIN
PAINLEVEL_OUTOF10: 0 - NO PAIN

## 2025-08-13 ASSESSMENT — ACTIVITIES OF DAILY LIVING (ADL): BATHING_ASSISTANCE: TOTAL

## 2025-08-13 NOTE — PROGRESS NOTES
Emilee Dempsey is a 45 y.o. female on day 4 of admission presenting with Altered mental status, unspecified altered mental status type.    SUBJECTIVE     Patient evaluated this morning, mental status continues to improve, she is more conversational today.    OBJECTIVE     Vitals:    08/13/25 0500 08/13/25 0600 08/13/25 0700 08/13/25 0800   BP: 144/68 127/66 116/63 118/66   BP Location:       Patient Position:       Pulse: 57 59 60 55   Resp: 24 26 (!) 28 25   Temp:       TempSrc:       SpO2: 93% 90% 97% 96%   Weight:  110 kg (242 lb 4.6 oz)     Height:          Results from last 7 days   Lab Units 08/13/25  0530   WBC AUTO x10*3/uL 13.0*   HEMOGLOBIN g/dL 14.8   HEMATOCRIT % 47.0*   PLATELETS AUTO x10*3/uL 260   NEUTROS PCT AUTO % 62.8   LYMPHS PCT AUTO % 17.8   MONOS PCT AUTO % 15.7   EOS PCT AUTO % 1.1     Results from last 7 days   Lab Units 08/13/25  0530 08/12/25  0526   SODIUM mmol/L 142 137   POTASSIUM mmol/L 2.7* 2.8*   CHLORIDE mmol/L 100 95*   CO2 mmol/L 33* 31   BUN mg/dL 11 10   CREATININE mg/dL 1.13* 1.10*   CALCIUM mg/dL 8.8 8.9   PROTEIN TOTAL g/dL  --  7.3   BILIRUBIN TOTAL mg/dL  --  0.6   ALK PHOS U/L  --  113*   ALT U/L  --  450*   AST U/L  --  211*   GLUCOSE mg/dL 94 130*     24hr Min/Max:  Temp  Min: 36.1 °C (97 °F)  Max: 36.8 °C (98.2 °F)  Pulse  Min: 55  Max: 79  BP  Min: 111/58  Max: 154/65  Resp  Min: 18  Max: 35  SpO2  Min: 87 %  Max: 98 %  LDA:   Implantable Port 08/09/25 Left Chest Single lumen port (Active)   Placement Date/Time: 08/09/25 1700   Hand Hygiene Completed: Yes  Orientation: Left  Implantable Port Location: Chest  Port Type: Single lumen port   Number of days: 0       Urethral Catheter 16 Fr. (Active)   Placement Date/Time: 08/09/25 1958   Placed by: Devorah Trammell  Hand Hygiene Completed: Yes  Tube Size (Fr.): 16 Fr.  Catheter Balloon Size: 10 mL  Urine Returned: Yes   Number of days: 0       Intake/Output Summary (Last 24 hours) at 8/13/2025 9295  Last data filed at  8/13/2025 0600  Gross per 24 hour   Intake 2373.97 ml   Output 3000 ml   Net -626.03 ml     All other labs and Imaging have been personally reviewed.     Scheduled Medications  Scheduled Medications[1]   Continuous Medications:   Continuous Medications[2]     Physical Exam:  General: Improving mental status, does not appear in distress  HEENT:  Normocephalic, atraumatic  Chest: Clear to auscultation bilaterally  CV: regular rate and rhythm, no murmurs    Abdomen: Abdomen is soft, non-tender, nondistended, BS +   Extremities:  No lower extremity edema or cyanosis.   Neurological: Alert and oriented x 2, no focal deficits  Skin:  Warm and dry.    ASSESSMENT & PLAN    Emilee Dempsey is a 45 y.o. female with past medical history of SLE, lupus nephritis, CKD, HFpEF, HTN, HLD, pulmonary hypertension who presents to [place] ICU for acute respiratory failure with hypoxia and hypercapnia, acute encephalopathy, and rhabdomyolysis.    Daily Progress:   8/10- Patient still remains somnolent, vitals stable.  BiPAP.  CT abdomen pelvis for further evaluation of concern of toxic megacolon.  Continue doxycycline, Zosyn and vancomycin    8/11-patient with appears improved from neurological standpoint, still appears lethargic.  Will check TSH, EEG, MRI when able.  Discontinued BiPAP for now.  Vitals stable.  CK downtrending.    8/12-patient still lethargic appearing.  Encephalopathy workup so far unremarkable, will broaden differential including tickborne diseases and hepatic etiology.  No source of infection located at this time.  Labs continue to improve.  Will discontinue heparin and vancomycin today.    8/13-patient's mental status continues to improve, pending results of procalcitonin, mycoplasma, repeat drug screen.  Attempt clear liquid diet today.  Patient stable for medical floor, ICU to sign off.    ASSESSMENT & PLAN  Neuro/Constitutional  #Acute metabolic encephalopathy  #History of depression/anxiety  -Suspect secondary  to infection, drug interaction toxicity  PLAN:  -Hold home tizanidine, benzos and opioids, Wellbutrin, trazodone  - Patient's daughter says that patient lives with mom and is alert and oriented x 3 at baseline, has recently been taking Diflucan and p.o. vancomycin  - Will continue to explore of her encephalopathy.  Of her current mental status  -Monitor for ICU delirium, maintain sleep hygiene  -Avoid anticholinergics, benzodiazepines, sedatives. Avoid excessive tubes/lines  -Patient requires no sedation or analgesia    Cardiovascular  #Elevated troponin, NSTEMI  #Prolonged QTc  #HFpEF  PLAN:  -Continue heparin drip  -Echocardiogram this admission shows EF 55 to 60%  -Cardiology consult  -Patient's home dose Bumex has been held, will consider restarting  -Maintain MAPs>65     Pulmonary  #Acute hypoxic and hypercapnic respiratory failure-appears resolved  #Pulmonary arterial hypertension  #History of asthma  PLAN:  -Wean supplemental oxygen, scheduled DuoNebs  -CT PE negative for PE but concerning for infection  -Will continue to monitor and consider intubation    GI  #Recent C. difficile infection- resolved  #Elevated LFTs 2/2 ischemia  #Hx of IBS  #Constipation  -Was on oral vancomycin from 7/28 to 8/8  PLAN:  -Patient recently completed 10-day oral vancomycin treatment  -Continue oral vancomycin when able for prophylaxis  -CT abdomen pelvis 8/10 shows moderate to large stool burden, negative for obstruction or findings suggestive of ileus    Renal  #CHACHA, likely prerenal- improving  PLAN:  -Patient's status post 3L IV fluids with improvement of creatinine  -Continue to trend renal function with daily labs, monitor UOP  -Replete electrolytes as needed    Endocrine  #History of Cushing's disease  -Monitor sugars with daily labs and POCT    Heme/Onc  #History of SLE  #History of DVT not on AC  PLAN:  -Continue home sulfasalazine and hydroxychloroquine when able  -Trend with daily labs  -Monitor for signs of overt  bleeding    ID  #Sepsis secondary to potentially multiple sources of infection  PLAN:  -Sources of infection include Mediport, GI/C. difficile, pneumonia  -Pending blood and urine cultures, continue doxycycline, vancomycin, Zosyn    Skin/MSK  #Rhabdomyolysis-resolved  #RLE cellulitis  PLAN:  -Trend CK, initial CK 13,000  - Continue antibiotics as above  - PT/OT    ICU CHECK LIST  Antimicrobials: Zosyn and doxycycline  Oxygen: -  Feeding: N.p.o. for now  Drips:   Fluids: D5 half-normal saline maintenance fluids  Analgesia:-  Sedation:-  VTE ppx: subcutaneous heparin  GI ppx:-  Glycemic control: Hypoglycemia protocol  Bowel care:-  Indwelling catheters: Garnett cath  Lines: 3 peripheral IVs and Mediport  Code Status: Full code    Richard Costello  PGY-2, Internal Medicine  Please SecureChat for any further questions  This is a preliminary note, please await attending attestation for final A/P        [1] doxycycline, 100 mg, intravenous, q12h  heparin, 5,000 Units, subcutaneous, q8h  ipratropium-albuteroL, 3 mL, nebulization, TID  nystatin, 1 Application, Topical, BID  [Transfer Hold] pantoprazole, 40 mg, intravenous, BID  perflutren protein A microsphere, 0.5 mL, intravenous, Once in imaging  piperacillin-tazobactam, 3.375 g, intravenous, q6h  potassium chloride, 20 mEq, oral, Once  potassium chloride, 20 mEq, intravenous, q2h  sulfur hexafluoride microsphr, 2 mL, intravenous, Once in imaging  vancomycin, 125 mg, oral, BID     [2] dextrose 5%-0.45 % sodium chloride, 125 mL/hr, Last Rate: 125 mL/hr (08/12/25 1900)

## 2025-08-13 NOTE — CONSULTS
Consults    Reason for Consult:  ICU stepdown: Improving CHACHA + acute metabolic encephalopathy 2/2 unintentional opioid overdose    Subjective   Patient is a 45 y.o. female admitted on 8/9/2025  3:15 PM with chief complaint of altered mental status and weakness.    HPI:    Patient is a 44yo F w/ PMH anxiety, asthma, SLE, lupus nephritis, CKD, HFpEF, HTN, HLD, pHTN who originally presented to [facility] ICU on 8/9/25 w/ AHRF, hypercapnia, acute encephalopathy, and rhabdomyolysis. Per initial report, she was found unresponsive and down in her room.  Her initial impression was significant for being hypoxic and hypercapnic with elevated lactate 5.5, creatinine 2.51 (baseline 1.00), and CK in the 13,000's.  She showed a good initial response to Narcan and BiPAP.  She was also started on IV fluids over the course of her ICU stay.  Apart from these problems, patient was also found to have elevated troponins concerning for type II MI demand ischemia in the setting of HFpEF and AHRF with hypercapnia and pulmonary hypertension.  Patient's mentation, CHACHA and rhabdo improving. Stable enough to transfer to floor for further management.     Past Medical History:  Medical History[1]    Past Surgical History:  Surgical History[2]     Family History:  Family History[3]     Social History:   reports that she has never smoked. She has never used smokeless tobacco. Alcohol use questions deferred to the physician. She reports that she does not use drugs.    Review of Systems:   Review of Systems   A 10+ point ROS was completed and otherwise negative except as noted above and per HPI.    Objective   Vitals:    08/13/25 1300   BP:    Pulse: 61   Resp: (!) 43   Temp:    SpO2: 95%      Physical Exam  Physical Exam:  Constitutional: well developed, awake, alert, no acute distress  ENMT: mucous membranes moist, EOMI, conjunctivae clear  Head/Neck: normocephalic, atraumatic; supple, trachea midline  Respiratory/Thorax: patent airways, CTAB; no  wheezes, rales, or rhonchi  Cardiovascular: RRR, no murmur  Gastrointestinal: soft, nondistended, non-tender, bowel sounds appreciated  Extremities: palpable peripheral pulses, no edema or cyanosis  Neurological: AO x3, no focal deficits  Psychological: appropriate mood and behavior  Skin: warm and dry    Assessment/Plan     #Acute Metabolic encephalopathy   #h/o chronic benzo/opioid use   - Like 2/2 #unintentional opioid overdose  Plan:   Hold tizanidine, benzo opioids, wellbutrin, trazodone or anything worsening mentation    #Sepsis ISO multiple sources of infection with #BLE cellulitis  -Sources: mediport, GI/c diff, PNA  - NGTD x 3 bcx    - SIRS: yes 8/13/2025  Plan:  -c/w IV zosyn    #ARHF w/ hypercapnia (resolved)  #pHTN    -CT PE neg. But c/f inf  Plan:  -CPAP nightly  -wean supp o2, scheduled duonebs    #Recent C. Diff (resolved)  -PO vanc from 7/28-8/8 (10 day)  - Patient having 2-3 bowel movements a day  Plan:  - Trend CBC and CMP daily    # Rhabdomyolysis with #CHACHA and #hypokalemia (improving)  -Creatinine in the twos on admission; baseline 1.00  - K: 2.7 8/13/2025  Plan:  -daily BMP, replete electroytes  - Replace potassium appropriately    Chronic medical conditions:  Asthma: DuoNebs  VALDEZ: CPAP  SLE: Continue home med hydroxychloroquine  CKD 2/2 lupus nephritis: Avoid nephrotoxins  HFpEF: Restart home diuretic  Fibromyalgia: Continue home med desvenlafaxine and gabapentin  Migraine: Tylenol as needed  MDD: Continue with home med Wellbutrin 300 mg daily  Chronic low back pain: Tylenol as needed; avoid opiates  Pulmonary hypertension: Unable to continue home meds due to provider prescription restrictions    DVT PPX: Subcutaneous heparin  Diet: Adult clear liquid diet   IVF: None  Code Status: Full code    This is a preliminary note written by the resident. Please wait for attending addendum for finalization of note and recommendations.    Quentin Jewell MD  Internal Medicine: PGY-1         [1]   Past  Medical History:  Diagnosis Date    Abnormal uterine bleeding (AUB)     Acute on chronic systolic CHF (congestive heart failure)     last saw cardiology  - appt needed, pt working on making appt.  Currently on cancellation list - scheduled for  (after surgery) pt aware needs cardiology appt prior to procedure    Acute pericarditis     admitted 3/2024, resolved per pulm note.    Adverse effect of anesthesia     ashtma attack when waking up    Alopecia areata     Anxiety     Asthma     Chronic headache     Chronic ITP (idiopathic thrombocytopenic purpura) (Multi)     s/p splenectomy.  4.30.24 - plt 407    Chronic respiratory failure     on 2-3L baseline    Chronic sinusitis     Cushing's disease (Multi)     Depression     Endometriosis     Eosinophilia     Fibromyalgia     Gastritis     Hypertension     Hypokalemia     4.30.24: K 3.4 - oral supplement    Lumbar spondylosis     Lupus nephritis (Multi)     GFR 79, BUN 26, creat 0.93    PAH (pulmonary artery hypertension) (Multi)     follows with Dr. Aponte at [facility] - sotatercept, Winrevair NYHA class 3    Personal history of pulmonary embolism      - while on oral birth control    Raynaud disease     RLS (restless legs syndrome)     SLE (systemic lupus erythematosus) (Multi)     Sleep apnea     CPAP with O2 bleed in    TIA (transient ischemic attack)     15 years ago per pt, residual memory issues   [2]   Past Surgical History:  Procedure Laterality Date    CERVICAL CONIZATION   W/ LASER      x 3    DILATION AND CURETTAGE OF UTERUS      HYSTEROSCOPY      NASAL SEPTUM SURGERY      OTHER SURGICAL HISTORY      Laparoscopy/hysteroscopy    OTHER SURGICAL HISTORY  2018    Surgically induced     OTHER SURGICAL HISTORY      RFA - back    SPLENECTOMY, TOTAL  2018    Splenectomy   [3]   Family History  Problem Relation Name Age of Onset    Heart failure Father      Stroke Father

## 2025-08-13 NOTE — PROGRESS NOTES
Physical Therapy    Physical Therapy Evaluation    Patient Name: Emilee Dempsey  MRN: [id number]  Today's Date: 8/13/2025   Time Calculation  Start Time: 1140  Stop Time: 1206  Time Calculation (min): 26 min  175/175-A    Assessment/Plan   PT Assessment  PT Assessment Results: Decreased strength, Decreased endurance, Impaired balance, Decreased mobility  Rehab Prognosis: Good  Barriers to Discharge Home: Physical needs  Physical Needs: High falls risk due to function or environment  End of Session Communication: Bedside nurse  Assessment Comment: Pt demonstrates impaired mobility throughout the session requiring increased level of assistance. Pt not at baseline and will benefit from further acute PT services and therapy s/p discharge to regain function and independence.  End of Session Patient Position: Alarm on, Bed, 3 rail up (all needs in reach and no complaints noted)  IP OR SWING BED PT PLAN  Inpatient or Swing Bed: Inpatient  PT Plan  Treatment/Interventions: Bed mobility, Transfer training, Gait training  PT Plan: Ongoing PT  PT Frequency for Current Admission: 3 times per week during this acute inpatient hospitalization  PT Discharge Recommendations: Moderate intensity level of continued care, Based on current functional status and rehab potential, patient is anticipated to tolerate and benefit from 5 or more days per week of skilled rehabilitative therapy after discharge from this acute inpatient hospitalization  PT Recommended Transfer Status: Assist x2  PT - OK to Discharge: Yes    Subjective     Current Problem:  Problem List[1]    General Visit Information:  General  Reason for Referral: PT Eval and Treat  Referred By: Rachid Simms DO  Past Medical History Relevant to Rehab: 45 y.o. female with past medical history of SLE, lupus nephritis, CKD, HFpEF, HTN, HLD, pulmonary hypertension who presents to [place] ICU for acute respiratory failure with hypoxia and hypercapnia, acute encephalopathy, and  rhabdomyolysis. History taken from clinician collateral as unable to speak with patient's next-of-kin. Patient was found down and unresponsive in her room this afternoon.  Family/Caregiver Present: No  Co-Treatment: OT  Co-Treatment Reason: maximize safety and functional mobility  Prior to Session Communication: Bedside nurse  Patient Position Received: Alarm on, Bed, 3 rail up  General Comment: Pt agreeable to PT.    Home Living:  Home Living  Type of Home: House  Lives With:  (mom)  Home Adaptive Equipment: Cane  Home Layout: Laundry in basement (2 floor home, however pt. stays on 1st floor, sleeps in a recliner with access to full bath)  Bathroom Shower/Tub: Tub/shower unit  Bathroom Toilet: Standard  Bathroom Equipment: Grab bars in shower, Shower chair with back    Prior Level of Function:  Prior Function Per Pt/Caregiver Report  Level of [place]: Independent with ADLs and functional transfers (Pt amb with furniture, mom completes iadl tasks primarily, pt. does not drive)    Precautions:  Precautions  Medical Precautions: Fall precautions (tele, bhatia, o2 3 L/min)    Vital Signs:  Vital Signs  Vital Signs Comment: VSS    Objective     Pain:  Pain Assessment  Pain Assessment:  (7/10 hips/low back, pt. repositioned for comfort)    Cognition:  Cognition  Overall Cognitive Status: Within Functional Limits    General Assessments:  Activity Tolerance  Endurance: Decreased tolerance for upright activites  Early Mobility/Exercise Safety Screen: Proceed with mobilization - No exclusion criteria met  Sensation  Light Touch:  ((+) neuropathy B UEs and LEs)  Strength  Strength Comments: B LE ROM WFL, B LE strength 3/5     Static Sitting Balance  Static Sitting-Level of Assistance: Contact guard  Static Standing Balance  Static Standing-Level of Assistance: Maximum assistance  Dynamic Standing Balance  Dynamic Standing-Level of Assistance: Maximum assistance    Functional Assessments:  Bed Mobility  Bed Mobility:  Yes  Bed Mobility 1  Bed Mobility Comments 1: supine <> sitting: mod-max A x 2  Transfers  Transfer: Yes  Transfer 1  Trials/Comments 1: STS from EOB: mod A x 2, pt demonstrating B LE buckling in standing and was unable to attempt amb at this time. Pt is a high fall risk    Outcome Measures:  [facility] Basic Mobility  Turning from your back to your side while in a flat bed without using bedrails: A lot  Moving from lying on your back to sitting on the side of a flat bed without using bedrails: A lot  Moving to and from bed to chair (including a wheelchair): Total  Standing up from a chair using your arms (e.g. wheelchair or bedside chair): A lot  To walk in hospital room: Total  Climbing 3-5 steps with railing: Total  Basic Mobility - Total Score: 9    FSS-ICU  Ambulation: Unable to attempt due to weakness  Rolling: Maximal assistance (performs 25% - 49% of task)  Sitting: Supervision or set-up only  Transfer Sit-to-Stand: Maximal assistance (performs 25% - 49% of task)  Transfer Supine-to-Sit: Maximal assistance (performs 25% - 49% of task)  Total Score: 11  Early Mobility/Exercise Safety Screen: Proceed with mobilization - No exclusion criteria met  ICU Mobility Scale: Standing [4]  E = Exercise and Early Mobility  Early Mobility/Exercise Safety Screen: Proceed with mobilization - No exclusion criteria met  ICU Mobility Scale: Standing    Goals:  Encounter Problems       Encounter Problems (Active)       PT Problem       STG - Pt will transition supine <> sitting with min A x 1  (Progressing)       Start:  08/13/25    Expected End:  08/27/25            STG - Pt will transfer STS with mod A x 1  (Progressing)       Start:  08/13/25    Expected End:  08/27/25            STG - Pt will amb 10' using RW with min A x 1  (Progressing)       Start:  08/13/25    Expected End:  08/27/25                 Education Documentation  Precautions, taught by Mimi Roldan, PT at 8/13/2025  4:04 PM.  Learner: Patient  Readiness:  Acceptance  Method: Explanation  Response: Verbalizes Understanding, Needs Reinforcement  Comment: PT POC    Mobility Training, taught by Mimi Roldan PT at 8/13/2025  4:04 PM.  Learner: Patient  Readiness: Acceptance  Method: Explanation  Response: Verbalizes Understanding, Needs Reinforcement  Comment: PT POC    Education Comments  No comments found.           [1]   Patient Active Problem List  Diagnosis    Idiopathic thrombocytopenic purpura (Multi)    Cushing disease (Multi)    Rheumatoid arthritis of multiple sites with negative rheumatoid factor (Multi)    Abnormal uterine bleeding    Acute on chronic right-sided heart failure    Non-cardiac chest pain    Chronic diarrhea    Fibromyalgia    Chronic respiratory failure with hypoxia    DJD (degenerative joint disease)    Dry eye syndrome of both eyes    Essential hypertension, benign    Generalized anxiety disorder    GERD (gastroesophageal reflux disease)    HLD (hyperlipidemia)    Hypokalemia    Irregular menses    Irritable bowel syndrome    Morbid obesity (Multi)    Pulmonary arterial hypertension (Multi)    Premature atrial complexes    PVC (premature ventricular contraction)    Raynaud disease    Recurrent major depressive disorder, in partial remission    Restless leg syndrome    Right heart failure due to pulmonary hypertension    S/P laparoscopic cholecystectomy    S/P splenectomy    Shortness of breath    Lupus erythematosus    VALDEZ (obstructive sleep apnea)    Asthma    Tricuspid regurgitation    Pre-op evaluation    Pulmonary hypertension (Multi)    Altered mental status, unspecified altered mental status type

## 2025-08-13 NOTE — CARE PLAN
The patient's goals for the shift include      The clinical goals for the shift include pt will remain hemodynamically stable      Problem: Pain - Adult  Goal: Verbalizes/displays adequate comfort level or baseline comfort level  Outcome: Progressing     Problem: Safety - Adult  Goal: Free from fall injury  Outcome: Progressing     Problem: Discharge Planning  Goal: Discharge to home or other facility with appropriate resources  Outcome: Progressing     Problem: Chronic Conditions and Co-morbidities  Goal: Patient's chronic conditions and co-morbidity symptoms are monitored and maintained or improved  Outcome: Progressing     Problem: Nutrition  Goal: Nutrient intake appropriate for maintaining nutritional needs  Outcome: Progressing     Problem: Skin  Goal: Decreased wound size/increased tissue granulation at next dressing change  Outcome: Progressing  Flowsheets (Taken 8/13/2025 1702)  Decreased wound size/increased tissue granulation at next dressing change: Promote sleep for wound healing  Goal: Participates in plan/prevention/treatment measures  Outcome: Progressing  Flowsheets (Taken 8/13/2025 1702)  Participates in plan/prevention/treatment measures: Elevate heels  Goal: Prevent/manage excess moisture  Outcome: Progressing  Flowsheets (Taken 8/13/2025 1702)  Prevent/manage excess moisture: Moisturize dry skin  Goal: Prevent/minimize sheer/friction injuries  Outcome: Progressing  Flowsheets (Taken 8/13/2025 1702)  Prevent/minimize sheer/friction injuries: HOB 30 degrees or less  Goal: Promote/optimize nutrition  Outcome: Progressing  Flowsheets (Taken 8/13/2025 1702)  Promote/optimize nutrition: Assist with feeding  Goal: Promote skin healing  Outcome: Progressing  Flowsheets (Taken 8/13/2025 1702)  Promote skin healing: Protective dressings over bony prominences     Problem: Fall/Injury  Goal: Not fall by end of shift  Outcome: Progressing  Goal: Be free from injury by end of the shift  Outcome:  Progressing  Goal: Verbalize understanding of personal risk factors for fall in the hospital  Outcome: Progressing  Goal: Verbalize understanding of risk factor reduction measures to prevent injury from fall in the home  Outcome: Progressing  Goal: Use assistive devices by end of the shift  Outcome: Progressing  Goal: Pace activities to prevent fatigue by end of the shift  Outcome: Progressing

## 2025-08-13 NOTE — PROGRESS NOTES
Occupational Therapy    Evaluation    Patient Name: Emilee Dempsey  MRN: [id number]  Department: [facility]  Room: 175/[facility]  Today's Date: 8/13/2025  Time Calculation  Start Time: 1141  Stop Time: 1206  Time Calculation (min): 25 min    Assessment  IP OT Assessment  OT Assessment: Pt. presents with a decline in self-care, mobility and safety and would benefit from skilled OT services to maximize independence and promote return to prior level of function.  Prognosis: Good  Barriers to Discharge Home: Physical needs  End of Session Communication: Bedside nurse  End of Session Patient Position: Bed, 3 rail up, Alarm on  Plan:  Treatment Interventions: ADL retraining, Functional transfer training, Endurance training, Compensatory technique education  OT Frequency for Current Admission: 3 times per week during this acute inpatient hospitalization  OT Discharge Recommendations: Moderate intensity level of continued care, Based on current functional status and rehab potential, patient is anticipated to tolerate and benefit from 5 or more days per week of skilled rehabilitative therapy after discharge from this acute inpatient hospitalization  OT Recommended Transfer Status: Assist of 2  OT - OK to Discharge: Yes (to next level of care when cleared by medical team)    Subjective   Current Problem:  1. Altered mental status, unspecified altered mental status type  EEG      2. CHACHA (acute kidney injury)        3. Acute hypoxic respiratory failure        4. NSTEMI (non-ST elevated myocardial infarction) (Multi)  Transthoracic Echo Complete    Transthoracic Echo Complete    CANCELED: Transthoracic Echo (TTE) Limited    CANCELED: Transthoracic Echo (TTE) Limited      5. Sepsis with acute renal failure, due to unspecified organism, unspecified acute renal failure type, unspecified whether septic shock present (Multi)        6. Endocarditis and heart valve disorders in diseases classified elsewhere  Transthoracic Echo Complete         OT Visit Info:  OT Received On: 08/13/25  General Visit Info:  General  Reason for Referral: impaired adl; pt. admitted with dx:  nstemi, CHACHA, AMS, sepsis, acute hypoxic resp failure, unresponsive opioid abuse/benzo use-found down-given Narcan, K-2.9, ct:  pancreatitis, intubated, extubated  Referred By: Mendez  Past Medical History Relevant to Rehab: c-diff, chest port, pulmonary htn, tia, fibromyalgia, SLE, obesity, splenectomy, MDD, migraine  Co-Treatment: PT  Co-Treatment Reason: to maximize patient safety/abilities  Prior to Session Communication: Bedside nurse  Patient Position Received: Bed, 3 rail up, Alarm on  General Comment: pt. agreeable to therapy intervention  Precautions:  Precautions Comment: VSS, tele, bhatia, o2 3 L/min     Date/Time Vitals Session Patient Position Pulse Resp SpO2 BP MAP (mmHg)    08/13/25 1400 --  --  62  41  94 %  --  --     08/13/25 1500 --  --  55  25  94 %  --  --           Pain:  Pain Assessment  Pain Assessment: 0-10  0-10 (Numeric) Pain Score:  (7/10 hips/low back, pt. repositioned for comfort)    Objective   Cognition:  Overall Cognitive Status: Within Functional Limits  Orientation Level: Oriented X4           Home Living:  Type of Home: House  Lives With: Parent(s) (mom)  Home Adaptive Equipment: Cane  Home Layout:  (2 floor home, however pt. stays on 1st floor, sleeps in a recliner with access to full bath)  Bathroom Shower/Tub: Tub/shower unit  Bathroom Toilet: Standard  Bathroom Equipment: Grab bars in shower (shower chair)  Home Living Comments: laundry in basement, mom does, pt. usually furniture walks, needs to order a rollator   Prior Function:  Level of [place]: Independent with ADLs and functional transfers  Receives Help From:  (mom completes iadl tasks primarily, pt. does not drive)     ADL:  Eating Assistance: Minimal  Grooming Assistance: Minimal  Bathing Assistance: Total  UE Dressing Assistance: Maximal  LE Dressing Assistance: Total  Pt.  Incontinent of stool during session, dependent assist for hygiene in sidelying, dependent to don socks in supine    Activity Tolerance:  Endurance: Decreased tolerance for upright activites  Early Mobility/Exercise Safety Screen: Proceed with mobilization - No exclusion criteria met  Bed Mobility/Transfers: Bed Mobility  Bed Mobility:  (mod assist x 2 supine to sit, max assist x 2 sit to supine, rolling R and L with max assist x 1)    Transfers  Transfer:  (sit<>stand from eob;  mod assist x 2)      Functional Mobility:  Functional Mobility  Functional Mobility Performed:  (unable to take steps safely with attempt using wh. walker)  Sensation:  Sensation Comment: neuropathy BUE's and BLE's  Strength:  Strength Comments: bue's at least 3/5 per observation     Coordination:  Movements are Fluid and Coordinated: Yes   Outcome Measures: [facility] Daily Activity  Putting on and taking off regular lower body clothing: Total  Bathing (including washing, rinsing, drying): Total  Putting on and taking off regular upper body clothing: A lot  Toileting, which includes using toilet, bedpan or urinal: Total  Taking care of personal grooming such as brushing teeth: A little  Eating Meals: A little  Daily Activity - Total Score: 11         and Early Mobility/Exercise Safety Screen: Proceed with mobilization - No exclusion criteria met  ICU Mobility Scale: Standing [4]  Education Documentation  Precautions, taught by Wanda Morgan OT at 8/13/2025  3:54 PM.  Learner: Patient  Readiness: Acceptance  Method: Explanation  Response: Verbalizes Understanding, Needs Reinforcement  Comment: OT POC    ADL Training, taught by Wanda Morgan OT at 8/13/2025  3:54 PM.  Learner: Patient  Readiness: Acceptance  Method: Explanation  Response: Verbalizes Understanding, Needs Reinforcement  Comment: OT POC      Goals:   Encounter Problems       Encounter Problems (Active)       OT Goals       Increase functional mobility and  functional transfers to  supervision for bed/chair/toilet with dme prn   (Progressing)       Start:  08/13/25    Expected End:  08/27/25            increase bue ther ex/activity x 7-10 minutes and increase standing tolerance x 3-5 minutes with supervision to promote greater activity tolerance for assist with adl.   (Progressing)       Start:  08/13/25    Expected End:  08/27/25            Increase ub/lb dressing to supervision with dme prn  (Progressing)       Start:  08/13/25    Expected End:  08/27/25            Increase toileting to supervision with dme prn  (Progressing)       Start:  08/13/25    Expected End:  08/27/25            pt. to apply ec/ws techniques with minimal cues to all mobility/transfer/adl to decrease fatigue/promote efficient use of energy toward completion of functional tasks.  (Progressing)       Start:  08/13/25    Expected End:  08/27/25

## 2025-08-13 NOTE — PROGRESS NOTES
Subjective Data:  Awake and alert but drowsy    Overnight Events:    None reported     Objective Data:  Last Recorded Vitals:  Vitals:    08/13/25 0600 08/13/25 0700 08/13/25 0800 08/13/25 0900   BP: 127/66 116/63 118/66    BP Location:       Patient Position:       Pulse: 59 60 55 60   Resp: 26 (!) 28 25 23   Temp:       TempSrc:       SpO2: 90% 97% 96% 96%   Weight: 110 kg (242 lb 4.6 oz)      Height:           Last Labs:  CBC - 8/13/2025:  5:30 AM  13.0 14.8 260    47.0      CMP - 8/13/2025:  5:30 AM  8.8 7.3 211 --- 0.6   3.0 3.4 450 113      PTT - 8/9/2025:  7:27 PM  _   _ 31     TROPHS   Date/Time Value Ref Range Status   08/10/2025 07:42  0 - 13 ng/L Final     Comment:     Previous result verified on 8/9/2025 1653 on specimen/case [id number] called with component TRPHS for procedure Troponin I, High Sensitivity, Initial with value 581 ng/L.   08/10/2025 01:38  0 - 13 ng/L Final     Comment:     Previous result verified on 8/9/2025 1653 on specimen/case [id number] called with component TRPHS for procedure Troponin I, High Sensitivity, Initial with value 581 ng/L.   08/09/2025 05:31  0 - 13 ng/L Final     Comment:     Previous result verified on 8/9/2025 1653 on specimen/case [id number] called with component TRPHS for procedure Troponin I, High Sensitivity, Initial with value 581 ng/L.     BNP   Date/Time Value Ref Range Status   10/08/2022 10:00 AM 22 0 - 99 pg/mL Final     Comment:     .  <100 pg/mL - Heart failure unlikely  100-299 pg/mL - Intermediate probability of acute heart  .               failure exacerbation. Correlate with clinical  .               context and patient history.    >=300 pg/mL - Heart Failure likely. Correlate with clinical  .               context and patient history.  BNP testing is performed using different testing   methodology at [facility] than at [facility]. Direct result comparisons should   only be made within the  same method.       HGBA1C   Date/Time Value Ref Range Status   02/03/2025 08:28 PM 5.2 4.3 - 5.6 % Final     Comment:     American Diabetes Association guidelines indicate that patients with HgbA1c in the range 5.7-6.4% are at increased risk for development of diabetes, and intervention by lifestyle modification may be beneficial. HgbA1c greater or equal to 6.5% is considered diagnostic of diabetes.   09/28/2022 11:59 AM 5.5 4.3 - 5.6 % Final     Comment:     American Diabetes Association guidelines indicate that patients with HgbA1c in the range 5.7-6.4% are at increased risk for development of diabetes, and intervention by lifestyle modification may be beneficial. HgbA1c greater or equal to 6.5% is considered diagnostic of diabetes.      Last I/O:  I/O last 3 completed shifts:  In: 3918 (35.7 mL/kg) [I.V.:3268 (29.7 mL/kg); IV Piggyback:650]  Out: 4575 (41.6 mL/kg) [Urine:4575 (1.2 mL/kg/hr)]  Weight: 109.9 kg     Past Cardiology Tests (Last 3 Years):  EKG:  ECG 12 lead 08/09/2025 (Preliminary)      ECG 12 Lead 06/14/2024    Echo:  Transthoracic Echo Complete 08/11/2025 LVEF = 55-60%      Transthoracic Echo Complete     Ejection Fractions:  EF   Date/Time Value Ref Range Status   08/11/2025 09:34 AM 58 %      Cath:  No results found for this or any previous visit from the past 1095 days.    Stress Test:  No results found for this or any previous visit from the past 1095 days.    Cardiac Imaging:  No results found for this or any previous visit from the past 1095 days.      Inpatient Medications:  Scheduled Medications[1]  PRN Medications[2]  Continuous Medications[3]    Physical Exam:  GEN: Obeses 44 yo wf lying flat without dyspnea  HEENTT: Atraumatic, normocephalic  COR: Reg  LUNGS: Clear anteriorly  EXT: Warm     Assessment/Plan   1.) NSTEMI likely demnand ischemisa  2.) Mental status improving  3.) Rhytm stable NSR  Discussed with Nurse Marlene  Peripheral IV 08/12/25 20 G Anterior;Left Forearm (Active)   Site  Assessment Clean;Dry;Intact 08/13/25 0709   Dressing Type Transparent 08/13/25 0709   Line Status Flushed;Capped 08/13/25 0709   Dressing Status Clean;Dry;Occlusive 08/13/25 0709   Number of days: 1       Peripheral IV 08/13/25 20 G Anterior;Right Forearm (Active)   Site Assessment Clean;Dry;Intact 08/13/25 0709   Dressing Type Transparent 08/13/25 0709   Line Status Infusing 08/13/25 0709   Dressing Status Clean;Dry;Occlusive 08/13/25 0709   Number of days: 0       Implantable Port 08/09/25 Left Chest Single lumen port (Active)   Site Assessment Clean;Dry;Intact 08/13/25 0709   Dressing Status Clean;Dry;Occlusive 08/13/25 0709   Number of days: 4       Urethral Catheter 16 Fr. (Active)   Output (mL) 150 mL 08/13/25 0600   Number of days: 4       Code Status:  Full Code    I spent 30 minutes in the professional and overall care of this patient.        Ata Anna MD       [1]   Scheduled medications   Medication Dose Route Frequency    heparin  5,000 Units subcutaneous q8h    ipratropium-albuteroL  3 mL nebulization TID    nystatin  1 Application Topical BID    [Transfer Hold] pantoprazole  40 mg intravenous BID    perflutren protein A microsphere  0.5 mL intravenous Once in imaging    piperacillin-tazobactam  3.375 g intravenous q6h    potassium chloride  20 mEq oral Once    potassium chloride  40 mEq oral Once    potassium chloride  20 mEq intravenous q2h    sulfur hexafluoride microsphr  2 mL intravenous Once in imaging    vancomycin  125 mg oral BID   [2]   PRN medications   Medication    albuterol    oxygen   [3]   Continuous Medications   Medication Dose Last Rate    [Held by provider] dextrose 5%-0.45 % sodium chloride  125 mL/hr Stopped (08/13/25 0907)

## 2025-08-13 NOTE — CARE PLAN
The patient's goals for the shift include      Problem: Pain - Adult  Goal: Verbalizes/displays adequate comfort level or baseline comfort level  Outcome: Progressing     Problem: Safety - Adult  Goal: Free from fall injury  Outcome: Progressing     Problem: Discharge Planning  Goal: Discharge to home or other facility with appropriate resources  Outcome: Progressing     Problem: Chronic Conditions and Co-morbidities  Goal: Patient's chronic conditions and co-morbidity symptoms are monitored and maintained or improved  Outcome: Progressing     Problem: Nutrition  Goal: Nutrient intake appropriate for maintaining nutritional needs  Outcome: Progressing     Problem: Skin  Goal: Decreased wound size/increased tissue granulation at next dressing change  Outcome: Progressing  Flowsheets (Taken 8/12/2025 1224 by Marlene Acuña RN)  Decreased wound size/increased tissue granulation at next dressing change: Promote sleep for wound healing  Goal: Participates in plan/prevention/treatment measures  Outcome: Progressing  Flowsheets (Taken 8/12/2025 1224 by Marlene Acuña RN)  Participates in plan/prevention/treatment measures: Elevate heels  Goal: Prevent/manage excess moisture  Outcome: Progressing  Flowsheets (Taken 8/12/2025 2122)  Prevent/manage excess moisture:   Cleanse incontinence/protect with barrier cream   Moisturize dry skin   Monitor for/manage infection if present  Goal: Prevent/minimize sheer/friction injuries  Outcome: Progressing  Flowsheets (Taken 8/12/2025 2122)  Prevent/minimize sheer/friction injuries:   Use pull sheet   HOB 30 degrees or less  Goal: Promote/optimize nutrition  Outcome: Progressing  Flowsheets (Taken 8/12/2025 2122)  Promote/optimize nutrition: Discuss with provider if NPO > 2 days  Goal: Promote skin healing  Outcome: Progressing  Flowsheets (Taken 8/12/2025 2122)  Promote skin healing:   Assess skin/pad under line(s)/device(s)   Protective dressings over bony prominences     Problem:  Fall/Injury  Goal: Not fall by end of shift  Outcome: Progressing  Goal: Be free from injury by end of the shift  Outcome: Progressing  Goal: Verbalize understanding of personal risk factors for fall in the hospital  Outcome: Progressing  Goal: Verbalize understanding of risk factor reduction measures to prevent injury from fall in the home  Outcome: Progressing  Goal: Use assistive devices by end of the shift  Outcome: Progressing  Goal: Pace activities to prevent fatigue by end of the shift  Outcome: Progressing     The clinical goals for the shift include pt will remain hemodynamically stable

## 2025-08-13 NOTE — CARE PLAN
Problem: Pain - Adult  Goal: Verbalizes/displays adequate comfort level or baseline comfort level  8/12/2025 2125 by Theresa Parker RN  Outcome: Progressing  8/12/2025 2122 by Theresa Parker RN  Outcome: Progressing     Problem: Safety - Adult  Goal: Free from fall injury  8/12/2025 2125 by Theresa Parker RN  Outcome: Progressing  8/12/2025 2122 by Theresa Parker RN  Outcome: Progressing     Problem: Discharge Planning  Goal: Discharge to home or other facility with appropriate resources  8/12/2025 2125 by Theresa Parker RN  Outcome: Progressing  8/12/2025 2122 by Theresa Parker RN  Outcome: Progressing     Problem: Chronic Conditions and Co-morbidities  Goal: Patient's chronic conditions and co-morbidity symptoms are monitored and maintained or improved  8/12/2025 2125 by Theresa Parker RN  Outcome: Progressing  8/12/2025 2122 by Theresa Parker RN  Outcome: Progressing     Problem: Nutrition  Goal: Nutrient intake appropriate for maintaining nutritional needs  8/12/2025 2125 by Theresa Parker RN  Outcome: Progressing  8/12/2025 2122 by Theresa Parker RN  Outcome: Progressing     Problem: Skin  Goal: Decreased wound size/increased tissue granulation at next dressing change  8/12/2025 2125 by Theresa Parker RN  Outcome: Progressing  8/12/2025 2122 by Theresa Parker RN  Outcome: Progressing  Flowsheets (Taken 8/12/2025 1224 by Marlene Acuña, RN)  Decreased wound size/increased tissue granulation at next dressing change: Promote sleep for wound healing  Goal: Participates in plan/prevention/treatment measures  8/12/2025 2125 by Theresa Parker RN  Outcome: Progressing  8/12/2025 2122 by Theresa Parker RN  Outcome: Progressing  Flowsheets (Taken 8/12/2025 1224 by Marlene Acuña, RN)  Participates in plan/prevention/treatment measures: Elevate heels  Goal: Prevent/manage excess moisture  8/12/2025 2125 by Theresa Parker RN  Outcome: Progressing  8/12/2025 2122 by Theresa Parker  RN  Outcome: Progressing  Flowsheets (Taken 8/12/2025 2122)  Prevent/manage excess moisture:   Cleanse incontinence/protect with barrier cream   Moisturize dry skin   Monitor for/manage infection if present  Goal: Prevent/minimize sheer/friction injuries  8/12/2025 2125 by Theresa Parker RN  Outcome: Progressing  8/12/2025 2122 by Theresa Parker RN  Outcome: Progressing  Flowsheets (Taken 8/12/2025 2122)  Prevent/minimize sheer/friction injuries:   Use pull sheet   HOB 30 degrees or less  Goal: Promote/optimize nutrition  8/12/2025 2125 by Theresa Parker RN  Outcome: Progressing  8/12/2025 2122 by Theresa Parker RN  Outcome: Progressing  Flowsheets (Taken 8/12/2025 2122)  Promote/optimize nutrition: Discuss with provider if NPO > 2 days  Goal: Promote skin healing  8/12/2025 2125 by Theresa Parker RN  Outcome: Progressing  8/12/2025 2122 by Theresa Parker RN  Outcome: Progressing  Flowsheets (Taken 8/12/2025 2122)  Promote skin healing:   Assess skin/pad under line(s)/device(s)   Protective dressings over bony prominences     Problem: Fall/Injury  Goal: Not fall by end of shift  8/12/2025 2125 by Theresa Parker RN  Outcome: Progressing  8/12/2025 2122 by Theresa Parker RN  Outcome: Progressing  Goal: Be free from injury by end of the shift  8/12/2025 2125 by Theresa Parker RN  Outcome: Progressing  8/12/2025 2122 by Theresa Parker RN  Outcome: Progressing  Goal: Verbalize understanding of personal risk factors for fall in the hospital  8/12/2025 2125 by Theresa Parker RN  Outcome: Progressing  8/12/2025 2122 by Theresa Parker RN  Outcome: Progressing  Goal: Verbalize understanding of risk factor reduction measures to prevent injury from fall in the home  8/12/2025 2125 by Theresa Parker RN  Outcome: Progressing  8/12/2025 2122 by Theresa Parker RN  Outcome: Progressing  Goal: Use assistive devices by end of the shift  8/12/2025 2125 by Theresa Parker RN  Outcome:  Progressing  8/12/2025 2122 by Theresa Parker RN  Outcome: Progressing  Goal: Pace activities to prevent fatigue by end of the shift  8/12/2025 2125 by Theresa Parker RN  Outcome: Progressing  8/12/2025 2122 by Theresa Parker RN  Outcome: Progressing   The patient's goals for the shift include      The clinical goals for the shift include pt will remain hemodynamically stable

## 2025-08-14 LAB
ALBUMIN SERPL BCP-MCNC: 3.3 G/DL (ref 3.4–5)
ALBUMIN SERPL BCP-MCNC: 3.3 G/DL (ref 3.4–5)
ALP SERPL-CCNC: 104 U/L (ref 33–110)
ALT SERPL W P-5'-P-CCNC: 209 U/L (ref 7–45)
ANION GAP SERPL CALC-SCNC: 12 MMOL/L (ref 10–20)
AST SERPL W P-5'-P-CCNC: 73 U/L (ref 9–39)
BASOPHILS # BLD AUTO: 0.12 X10*3/UL (ref 0–0.1)
BASOPHILS NFR BLD AUTO: 1 %
BILIRUB DIRECT SERPL-MCNC: 0.1 MG/DL (ref 0–0.3)
BILIRUB SERPL-MCNC: 0.5 MG/DL (ref 0–1.2)
BUN SERPL-MCNC: 15 MG/DL (ref 6–23)
CALCIUM SERPL-MCNC: 9 MG/DL (ref 8.6–10.3)
CHLORIDE SERPL-SCNC: 100 MMOL/L (ref 98–107)
CO2 SERPL-SCNC: 30 MMOL/L (ref 21–32)
CREAT SERPL-MCNC: 1.01 MG/DL (ref 0.5–1.05)
EGFRCR SERPLBLD CKD-EPI 2021: 71 ML/MIN/1.73M*2
EOSINOPHIL # BLD AUTO: 0.29 X10*3/UL (ref 0–0.7)
EOSINOPHIL NFR BLD AUTO: 2.5 %
ERYTHROCYTE [DISTWIDTH] IN BLOOD BY AUTOMATED COUNT: 21.1 % (ref 11.5–14.5)
GLUCOSE BLD MANUAL STRIP-MCNC: 101 MG/DL (ref 74–99)
GLUCOSE BLD MANUAL STRIP-MCNC: 197 MG/DL (ref 74–99)
GLUCOSE BLD MANUAL STRIP-MCNC: 64 MG/DL (ref 74–99)
GLUCOSE BLD MANUAL STRIP-MCNC: 84 MG/DL (ref 74–99)
GLUCOSE BLD MANUAL STRIP-MCNC: 94 MG/DL (ref 74–99)
GLUCOSE BLD MANUAL STRIP-MCNC: 94 MG/DL (ref 74–99)
GLUCOSE BLD MANUAL STRIP-MCNC: 97 MG/DL (ref 74–99)
GLUCOSE SERPL-MCNC: 83 MG/DL (ref 74–99)
HCT VFR BLD AUTO: 46.7 % (ref 36–46)
HGB BLD-MCNC: 14.4 G/DL (ref 12–16)
IMM GRANULOCYTES # BLD AUTO: 0.2 X10*3/UL (ref 0–0.7)
IMM GRANULOCYTES NFR BLD AUTO: 1.7 % (ref 0–0.9)
LYMPHOCYTES # BLD AUTO: 3.12 X10*3/UL (ref 1.2–4.8)
LYMPHOCYTES NFR BLD AUTO: 26.7 %
M PNEUMO IGM SER IA-ACNC: 0 U/L
MAGNESIUM SERPL-MCNC: 1.82 MG/DL (ref 1.6–2.4)
MCH RBC QN AUTO: 27.9 PG (ref 26–34)
MCHC RBC AUTO-ENTMCNC: 30.8 G/DL (ref 32–36)
MCV RBC AUTO: 90 FL (ref 80–100)
MONOCYTES # BLD AUTO: 1.67 X10*3/UL (ref 0.1–1)
MONOCYTES NFR BLD AUTO: 14.3 %
NEUTROPHILS # BLD AUTO: 6.29 X10*3/UL (ref 1.2–7.7)
NEUTROPHILS NFR BLD AUTO: 53.8 %
NRBC BLD-RTO: 1.5 /100 WBCS (ref 0–0)
PHOSPHATE SERPL-MCNC: 3.4 MG/DL (ref 2.5–4.9)
PLATELET # BLD AUTO: 277 X10*3/UL (ref 150–450)
POTASSIUM SERPL-SCNC: 3.3 MMOL/L (ref 3.5–5.3)
PROT SERPL-MCNC: 6.3 G/DL (ref 6.4–8.2)
RBC # BLD AUTO: 5.17 X10*6/UL (ref 4–5.2)
SODIUM SERPL-SCNC: 139 MMOL/L (ref 136–145)
WBC # BLD AUTO: 11.7 X10*3/UL (ref 4.4–11.3)

## 2025-08-14 PROCEDURE — 2500000004 HC RX 250 GENERAL PHARMACY W/ HCPCS (ALT 636 FOR OP/ED)

## 2025-08-14 PROCEDURE — 83735 ASSAY OF MAGNESIUM: CPT

## 2025-08-14 PROCEDURE — 2500000004 HC RX 250 GENERAL PHARMACY W/ HCPCS (ALT 636 FOR OP/ED): Performed by: INTERNAL MEDICINE

## 2025-08-14 PROCEDURE — 94640 AIRWAY INHALATION TREATMENT: CPT

## 2025-08-14 PROCEDURE — 2500000005 HC RX 250 GENERAL PHARMACY W/O HCPCS: Performed by: INTERNAL MEDICINE

## 2025-08-14 PROCEDURE — 2500000001 HC RX 250 WO HCPCS SELF ADMINISTERED DRUGS (ALT 637 FOR MEDICARE OP)

## 2025-08-14 PROCEDURE — 2500000005 HC RX 250 GENERAL PHARMACY W/O HCPCS

## 2025-08-14 PROCEDURE — 2500000002 HC RX 250 W HCPCS SELF ADMINISTERED DRUGS (ALT 637 FOR MEDICARE OP, ALT 636 FOR OP/ED)

## 2025-08-14 PROCEDURE — 85025 COMPLETE CBC W/AUTO DIFF WBC: CPT

## 2025-08-14 PROCEDURE — 80069 RENAL FUNCTION PANEL: CPT

## 2025-08-14 PROCEDURE — 82248 BILIRUBIN DIRECT: CPT

## 2025-08-14 PROCEDURE — 36415 COLL VENOUS BLD VENIPUNCTURE: CPT

## 2025-08-14 PROCEDURE — 2500000001 HC RX 250 WO HCPCS SELF ADMINISTERED DRUGS (ALT 637 FOR MEDICARE OP): Performed by: INTERNAL MEDICINE

## 2025-08-14 PROCEDURE — 2500000002 HC RX 250 W HCPCS SELF ADMINISTERED DRUGS (ALT 637 FOR MEDICARE OP, ALT 636 FOR OP/ED): Performed by: INTERNAL MEDICINE

## 2025-08-14 PROCEDURE — 1100000001 HC PRIVATE ROOM DAILY

## 2025-08-14 PROCEDURE — 99232 SBSQ HOSP IP/OBS MODERATE 35: CPT | Performed by: INTERNAL MEDICINE

## 2025-08-14 PROCEDURE — 82947 ASSAY GLUCOSE BLOOD QUANT: CPT

## 2025-08-14 RX ORDER — DEXTROSE 50 % IN WATER (D50W) INTRAVENOUS SYRINGE
12.5
Status: DISCONTINUED | OUTPATIENT
Start: 2025-08-14 | End: 2025-08-18 | Stop reason: HOSPADM

## 2025-08-14 RX ORDER — TIZANIDINE 4 MG/1
4 TABLET ORAL EVERY 8 HOURS PRN
Status: DISCONTINUED | OUTPATIENT
Start: 2025-08-14 | End: 2025-08-18 | Stop reason: HOSPADM

## 2025-08-14 RX ORDER — ROPINIROLE 1 MG/1
1 TABLET, FILM COATED ORAL 3 TIMES DAILY
Status: DISCONTINUED | OUTPATIENT
Start: 2025-08-14 | End: 2025-08-18 | Stop reason: HOSPADM

## 2025-08-14 RX ORDER — POTASSIUM CHLORIDE 1.5 G/1.58G
20 POWDER, FOR SOLUTION ORAL ONCE
Status: COMPLETED | OUTPATIENT
Start: 2025-08-14 | End: 2025-08-14

## 2025-08-14 RX ORDER — LOPERAMIDE HYDROCHLORIDE 2 MG/1
2 CAPSULE ORAL
Status: DISCONTINUED | OUTPATIENT
Start: 2025-08-15 | End: 2025-08-17

## 2025-08-14 RX ORDER — OXYCODONE HYDROCHLORIDE 5 MG/1
5 TABLET ORAL EVERY 8 HOURS PRN
Refills: 0 | Status: DISCONTINUED | OUTPATIENT
Start: 2025-08-14 | End: 2025-08-18 | Stop reason: HOSPADM

## 2025-08-14 RX ORDER — DEXTROSE 50 % IN WATER (D50W) INTRAVENOUS SYRINGE
25
Status: DISCONTINUED | OUTPATIENT
Start: 2025-08-14 | End: 2025-08-18 | Stop reason: HOSPADM

## 2025-08-14 RX ORDER — LOPERAMIDE HYDROCHLORIDE 2 MG/1
4 CAPSULE ORAL
Status: DISCONTINUED | OUTPATIENT
Start: 2025-08-14 | End: 2025-08-15

## 2025-08-14 RX ADMIN — ROPINIROLE HYDROCHLORIDE 1 MG: 1 TABLET, FILM COATED ORAL at 21:14

## 2025-08-14 RX ADMIN — POTASSIUM CHLORIDE 20 MEQ: 1.5 POWDER, FOR SOLUTION ORAL at 07:55

## 2025-08-14 RX ADMIN — LEVALBUTEROL HYDROCHLORIDE 1.25 MG: 1.25 SOLUTION RESPIRATORY (INHALATION) at 19:18

## 2025-08-14 RX ADMIN — BUPROPION HYDROCHLORIDE 300 MG: 150 TABLET, EXTENDED RELEASE ORAL at 08:36

## 2025-08-14 RX ADMIN — PANTOPRAZOLE SODIUM 20 MG: 20 TABLET, DELAYED RELEASE ORAL at 08:36

## 2025-08-14 RX ADMIN — HYDROXYCHLOROQUINE SULFATE 200 MG: 200 TABLET, FILM COATED ORAL at 08:36

## 2025-08-14 RX ADMIN — LOPERAMIDE HYDROCHLORIDE 4 MG: 2 CAPSULE ORAL at 08:36

## 2025-08-14 RX ADMIN — MONTELUKAST 10 MG: 10 TABLET, FILM COATED ORAL at 21:14

## 2025-08-14 RX ADMIN — DEXTROSE MONOHYDRATE 12.5 G: 25 INJECTION, SOLUTION INTRAVENOUS at 08:24

## 2025-08-14 RX ADMIN — LEVALBUTEROL HYDROCHLORIDE 1.25 MG: 1.25 SOLUTION RESPIRATORY (INHALATION) at 08:22

## 2025-08-14 RX ADMIN — OXYCODONE HYDROCHLORIDE 5 MG: 5 TABLET ORAL at 08:36

## 2025-08-14 RX ADMIN — HEPARIN SODIUM 5000 UNITS: 5000 INJECTION, SOLUTION INTRAVENOUS; SUBCUTANEOUS at 22:08

## 2025-08-14 RX ADMIN — NYSTATIN 1 APPLICATION: 100000 POWDER TOPICAL at 09:26

## 2025-08-14 RX ADMIN — LOPERAMIDE HYDROCHLORIDE 4 MG: 2 CAPSULE ORAL at 14:36

## 2025-08-14 RX ADMIN — ROPINIROLE HYDROCHLORIDE 1 MG: 1 TABLET, FILM COATED ORAL at 17:17

## 2025-08-14 RX ADMIN — Medication: at 08:22

## 2025-08-14 RX ADMIN — LEVALBUTEROL HYDROCHLORIDE 1.25 MG: 1.25 SOLUTION RESPIRATORY (INHALATION) at 14:50

## 2025-08-14 RX ADMIN — Medication 3 MG: at 21:14

## 2025-08-14 RX ADMIN — DESVENLAFAXINE SUCCINATE 100 MG: 25 TABLET, FILM COATED, EXTENDED RELEASE ORAL at 10:18

## 2025-08-14 RX ADMIN — HEPARIN SODIUM 5000 UNITS: 5000 INJECTION, SOLUTION INTRAVENOUS; SUBCUTANEOUS at 05:52

## 2025-08-14 RX ADMIN — GABAPENTIN 1200 MG: 400 CAPSULE ORAL at 21:13

## 2025-08-14 RX ADMIN — LOPERAMIDE HYDROCHLORIDE 4 MG: 2 CAPSULE ORAL at 17:17

## 2025-08-14 RX ADMIN — POTASSIUM CHLORIDE 40 MEQ: 1.5 POWDER, FOR SOLUTION ORAL at 11:47

## 2025-08-14 RX ADMIN — MAGNESIUM OXIDE TAB 400 MG (241.3 MG ELEMENTAL MG) 1 TABLET: 400 (241.3 MG) TAB at 08:36

## 2025-08-14 RX ADMIN — TIZANIDINE 4 MG: 4 TABLET ORAL at 23:28

## 2025-08-14 RX ADMIN — MAGNESIUM OXIDE TAB 400 MG (241.3 MG ELEMENTAL MG) 1 TABLET: 400 (241.3 MG) TAB at 21:14

## 2025-08-14 RX ADMIN — DEXTROSE MONOHYDRATE 12.5 G: 25 INJECTION, SOLUTION INTRAVENOUS at 07:55

## 2025-08-14 RX ADMIN — HEPARIN SODIUM 5000 UNITS: 5000 INJECTION, SOLUTION INTRAVENOUS; SUBCUTANEOUS at 14:36

## 2025-08-14 RX ADMIN — POTASSIUM CHLORIDE 40 MEQ: 1.5 POWDER, FOR SOLUTION ORAL at 14:36

## 2025-08-14 RX ADMIN — POTASSIUM CHLORIDE 40 MEQ: 1.5 POWDER, FOR SOLUTION ORAL at 21:13

## 2025-08-14 RX ADMIN — Medication: at 19:18

## 2025-08-14 ASSESSMENT — PAIN - FUNCTIONAL ASSESSMENT
PAIN_FUNCTIONAL_ASSESSMENT: 0-10

## 2025-08-14 ASSESSMENT — PAIN SCALES - GENERAL
PAINLEVEL_OUTOF10: 0 - NO PAIN
PAINLEVEL_OUTOF10: 0 - NO PAIN
PAINLEVEL_OUTOF10: 2
PAINLEVEL_OUTOF10: 2
PAINLEVEL_OUTOF10: 7
PAINLEVEL_OUTOF10: 0 - NO PAIN

## 2025-08-14 ASSESSMENT — PAIN DESCRIPTION - DESCRIPTORS
DESCRIPTORS: DISCOMFORT;ACHING
DESCRIPTORS: ACHING;DISCOMFORT

## 2025-08-14 NOTE — PROGRESS NOTES
Emilee Dempsey is a 45 y.o. female on day 5 of admission presenting with Altered mental status, unspecified altered mental status type.      SUBJECTIVE     Patient evaluated this morning and found to be resting comfortably in bed.  Today the patient reports that she is generally feeling better and her weakness is improving.  She reports that she uses oxygen at home 2 L NC at baseline.  She currently denies SOB, chest pain, abdominal pain, dizziness/lightheadedness.    OBJECTIVE     Vitals:    08/14/25 1148 08/14/25 1200 08/14/25 1300 08/14/25 1400   BP: 154/88      BP Location: Right arm      Patient Position: Sitting      Pulse: 66 56 62 65   Resp: 17 21 23 25   Temp:       TempSrc:       SpO2: 94% 97% 97% 99%   Weight:       Height:          Results from last 7 days   Lab Units 08/14/25  0412   WBC AUTO x10*3/uL 11.7*   HEMOGLOBIN g/dL 14.4   HEMATOCRIT % 46.7*   PLATELETS AUTO x10*3/uL 277   NEUTROS PCT AUTO % 53.8   LYMPHS PCT AUTO % 26.7   MONOS PCT AUTO % 14.3   EOS PCT AUTO % 2.5     Results from last 7 days   Lab Units 08/14/25  0412   SODIUM mmol/L 139   POTASSIUM mmol/L 3.3*   CHLORIDE mmol/L 100   CO2 mmol/L 30   BUN mg/dL 15   CREATININE mg/dL 1.01   CALCIUM mg/dL 9.0   PROTEIN TOTAL g/dL 6.3*   BILIRUBIN TOTAL mg/dL 0.5   ALK PHOS U/L 104   ALT U/L 209*   AST U/L 73*   GLUCOSE mg/dL 83       Scheduled Medications  Scheduled Medications[1]   Physical Exam    Constitutional: Well developed, A&Ox3, no acute distress, alert and cooperative  Eyes: EOMI, clear sclera  Respiratory/Thorax: CTAB, good chest expansion  Cardiovascular: Regular rate, regular rhythm  Gastrointestinal: Nondistended, soft, non-tender  Extremities: normal extremities, no cyanosis edema, regressing BLE erythema  Skin: Warm and dry    Pertinent Imaging    CT abdomen pelvis wo IV contrast 8/10/2025:  Mild perinephric stranding increased relative to comparison and  nonspecific. Evaluation for ascending urinary  tract  infection/pyelonephritis is limited by contrast bolus timing and  correlation with urinalysis is suggested as indicated. Mild stranding adjacent to the pancreatic tail likely related to  perinephric fat stranding although correlation with lipase to  evaluate for potential acute interstitial edematous pancreatitis  which is considered unlikely although not definitively excluded.    Microbiology    BCx x 2 8/9/2025: NGTD x 4  Staph/MRSA colonization 8/13/2025: Pending  Strep pneumo urine antigen 8/12/2025: Negative  Legionella urine antigen 8/11/2025: Negative    ASSESSMENT & PLAN   44yo F w/ PMH SLE, lupus nephritis, CKD, HFpEF, HTN, HLD, pHTN presented to [facility] ICU on 8/9/25 w/ AHRF, hypercapnia, acute encephalopathy, and rhabdo. Per initial report: She was found unresponsive and down in her room. Pt was stabilized in ICU. Mentation, CHACHA and rhabdo improving. Stable enough to transfer to floor for further management.    Daily Progress  - Discontinued Zosyn (5-day course)  - Continue with scheduled DuoNebs, CPAP nightly, on baseline NC O2 2 L tolerating well  - Trend CBC, CMP monitor  - Liver function improving  - PT eval: AM-PAC 9; TCC printed list for potential SNFs and provided for patient to review and get back with choices    ASSESSMENT & PLAN  #Acute Metabolic encephalopathy   #h/o chronic benzo/opioid use   - Likely 2/2 #unintentional opioid overdose  Plan:   -Hold tizanidine, benzo, opioids, trazodone or anything worsening mentation     #SIRS without sepsis    - multiple sources of infection with #BLE cellulitis (resolved)  - Sources: mediport, GI/c diff, PNA  - NGTD x 3 bcx    - SIRS: Met on 8/13/2025  Plan:  - Discontinue IV zosyn     #ARHF w/ hypercapnia (resolved)  #pHTN    -CT PE neg. But c/f inf  Plan:  -CPAP nightly  - Back to baseline 2 L NC supp o2, scheduled duonebs     #Recent C. 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 Please wait for attending addendum for finalization of note and recommendations.    Quentin Jewell MD  PGY-1, Internal Medicine  Please SecureChat for any further questions  This is a preliminary note, please await attending attestation for final A/P         [1] buPROPion XL, 300 mg, oral, q AM  desvenlafaxine, 100 mg, oral, Daily  fluticasone, 2 spray, Each Nostril, BID  gabapentin, 1,200 mg, oral, Nightly  heparin, 5,000 Units, subcutaneous, q8h  hydroxychloroquine, 200 mg, oral, BID  levalbuterol, 1.25 mg, nebulization, TID  magnesium oxide, 400 mg of magnesium oxide, oral, BID  melatonin, 3 mg, oral, Nightly  montelukast, 10 mg, oral, Nightly  nystatin, 1 Application, Topical, BID  pantoprazole, 20 mg, oral, BID  perflutren protein A microsphere, 0.5 mL, intravenous, Once in imaging  potassium chloride, 40 mEq, oral, TID  sulfur hexafluoride microsphr, 2 mL, intravenous, Once  in imaging

## 2025-08-14 NOTE — NURSING NOTE
Patient is a one person, standby assist with gait belt and walker for additional support. Patient used BSC and continent of a small soft BM. Patient then assisted to Chair. Call light within reach, chair alarm engaged, and patient safety maintained.

## 2025-08-14 NOTE — CARE PLAN
The patient's goals for the shift include  get out of bed for bowel and bladder continence    The clinical goals for the shift include patient will increase mobility    Over the shift, the patient did not make progress toward the following goals. Barriers to progression include increased weakness. Recommendations to address these barriers include continue plan of care.

## 2025-08-14 NOTE — PROGRESS NOTES
Met with patient at bedside. Introduced self and role as care coordinator. Discussed discharge planning with patient and potential need for SNF. SNF list printed and provided for patient and mother to review and get back with choices.

## 2025-08-15 LAB
ALBUMIN SERPL BCP-MCNC: 3.2 G/DL (ref 3.4–5)
ANION GAP SERPL CALC-SCNC: 11 MMOL/L (ref 10–20)
BASOPHILS # BLD AUTO: 0.09 X10*3/UL (ref 0–0.1)
BASOPHILS NFR BLD AUTO: 0.8 %
BUN SERPL-MCNC: 16 MG/DL (ref 6–23)
CALCIUM SERPL-MCNC: 8.9 MG/DL (ref 8.6–10.3)
CHLORIDE SERPL-SCNC: 105 MMOL/L (ref 98–107)
CO2 SERPL-SCNC: 27 MMOL/L (ref 21–32)
CREAT SERPL-MCNC: 0.9 MG/DL (ref 0.5–1.05)
EGFRCR SERPLBLD CKD-EPI 2021: 82 ML/MIN/1.73M*2
EOSINOPHIL # BLD AUTO: 0.43 X10*3/UL (ref 0–0.7)
EOSINOPHIL NFR BLD AUTO: 3.7 %
ERYTHROCYTE [DISTWIDTH] IN BLOOD BY AUTOMATED COUNT: 21.2 % (ref 11.5–14.5)
GLUCOSE BLD MANUAL STRIP-MCNC: 103 MG/DL (ref 74–99)
GLUCOSE BLD MANUAL STRIP-MCNC: 116 MG/DL (ref 74–99)
GLUCOSE BLD MANUAL STRIP-MCNC: 89 MG/DL (ref 74–99)
GLUCOSE BLD MANUAL STRIP-MCNC: 93 MG/DL (ref 74–99)
GLUCOSE BLD MANUAL STRIP-MCNC: 97 MG/DL (ref 74–99)
GLUCOSE SERPL-MCNC: 93 MG/DL (ref 74–99)
HCT VFR BLD AUTO: 46.7 % (ref 36–46)
HGB BLD-MCNC: 14.3 G/DL (ref 12–16)
IMM GRANULOCYTES # BLD AUTO: 0.3 X10*3/UL (ref 0–0.7)
IMM GRANULOCYTES NFR BLD AUTO: 2.6 % (ref 0–0.9)
LYMPHOCYTES # BLD AUTO: 3.19 X10*3/UL (ref 1.2–4.8)
LYMPHOCYTES NFR BLD AUTO: 27.2 %
MAGNESIUM SERPL-MCNC: 1.79 MG/DL (ref 1.6–2.4)
MCH RBC QN AUTO: 28.3 PG (ref 26–34)
MCHC RBC AUTO-ENTMCNC: 30.6 G/DL (ref 32–36)
MCV RBC AUTO: 92 FL (ref 80–100)
MONOCYTES # BLD AUTO: 1.31 X10*3/UL (ref 0.1–1)
MONOCYTES NFR BLD AUTO: 11.2 %
NEUTROPHILS # BLD AUTO: 6.42 X10*3/UL (ref 1.2–7.7)
NEUTROPHILS NFR BLD AUTO: 54.5 %
NRBC BLD-RTO: 1.5 /100 WBCS (ref 0–0)
PHOSPHATE SERPL-MCNC: 3.4 MG/DL (ref 2.5–4.9)
PLATELET # BLD AUTO: 283 X10*3/UL (ref 150–450)
POTASSIUM SERPL-SCNC: 3.4 MMOL/L (ref 3.5–5.3)
RBC # BLD AUTO: 5.06 X10*6/UL (ref 4–5.2)
SODIUM SERPL-SCNC: 140 MMOL/L (ref 136–145)
STAPHYLOCOCCUS SPEC CULT: ABNORMAL
WBC # BLD AUTO: 11.7 X10*3/UL (ref 4.4–11.3)

## 2025-08-15 PROCEDURE — 2500000001 HC RX 250 WO HCPCS SELF ADMINISTERED DRUGS (ALT 637 FOR MEDICARE OP)

## 2025-08-15 PROCEDURE — 94640 AIRWAY INHALATION TREATMENT: CPT

## 2025-08-15 PROCEDURE — 83735 ASSAY OF MAGNESIUM: CPT

## 2025-08-15 PROCEDURE — 99239 HOSP IP/OBS DSCHRG MGMT >30: CPT | Performed by: INTERNAL MEDICINE

## 2025-08-15 PROCEDURE — 97110 THERAPEUTIC EXERCISES: CPT | Mod: GO

## 2025-08-15 PROCEDURE — 80069 RENAL FUNCTION PANEL: CPT

## 2025-08-15 PROCEDURE — 2500000001 HC RX 250 WO HCPCS SELF ADMINISTERED DRUGS (ALT 637 FOR MEDICARE OP): Performed by: INTERNAL MEDICINE

## 2025-08-15 PROCEDURE — 36415 COLL VENOUS BLD VENIPUNCTURE: CPT

## 2025-08-15 PROCEDURE — 1100000001 HC PRIVATE ROOM DAILY

## 2025-08-15 PROCEDURE — 85025 COMPLETE CBC W/AUTO DIFF WBC: CPT

## 2025-08-15 PROCEDURE — 2500000004 HC RX 250 GENERAL PHARMACY W/ HCPCS (ALT 636 FOR OP/ED)

## 2025-08-15 PROCEDURE — 2500000004 HC RX 250 GENERAL PHARMACY W/ HCPCS (ALT 636 FOR OP/ED): Performed by: INTERNAL MEDICINE

## 2025-08-15 PROCEDURE — 2500000005 HC RX 250 GENERAL PHARMACY W/O HCPCS

## 2025-08-15 PROCEDURE — 2500000002 HC RX 250 W HCPCS SELF ADMINISTERED DRUGS (ALT 637 FOR MEDICARE OP, ALT 636 FOR OP/ED)

## 2025-08-15 PROCEDURE — 2500000005 HC RX 250 GENERAL PHARMACY W/O HCPCS: Performed by: INTERNAL MEDICINE

## 2025-08-15 PROCEDURE — 82947 ASSAY GLUCOSE BLOOD QUANT: CPT

## 2025-08-15 RX ORDER — DIAZEPAM 5 MG/1
10 TABLET ORAL EVERY 8 HOURS PRN
Status: DISCONTINUED | OUTPATIENT
Start: 2025-08-15 | End: 2025-08-18 | Stop reason: HOSPADM

## 2025-08-15 RX ORDER — POTASSIUM CHLORIDE 1.5 G/1.58G
20 POWDER, FOR SOLUTION ORAL ONCE
Status: COMPLETED | OUTPATIENT
Start: 2025-08-15 | End: 2025-08-15

## 2025-08-15 RX ADMIN — POTASSIUM CHLORIDE 20 MEQ: 1.5 POWDER, FOR SOLUTION ORAL at 09:23

## 2025-08-15 RX ADMIN — DESVENLAFAXINE SUCCINATE 100 MG: 25 TABLET, FILM COATED, EXTENDED RELEASE ORAL at 09:24

## 2025-08-15 RX ADMIN — HYDROXYCHLOROQUINE SULFATE 200 MG: 200 TABLET, FILM COATED ORAL at 09:23

## 2025-08-15 RX ADMIN — OXYCODONE HYDROCHLORIDE 5 MG: 5 TABLET ORAL at 23:19

## 2025-08-15 RX ADMIN — NYSTATIN 1 APPLICATION: 100000 POWDER TOPICAL at 20:41

## 2025-08-15 RX ADMIN — LEVALBUTEROL HYDROCHLORIDE 1.25 MG: 1.25 SOLUTION RESPIRATORY (INHALATION) at 07:34

## 2025-08-15 RX ADMIN — LOPERAMIDE HYDROCHLORIDE 2 MG: 2 CAPSULE ORAL at 06:30

## 2025-08-15 RX ADMIN — OXYCODONE HYDROCHLORIDE 5 MG: 5 TABLET ORAL at 14:22

## 2025-08-15 RX ADMIN — BUPROPION HYDROCHLORIDE 300 MG: 150 TABLET, EXTENDED RELEASE ORAL at 09:23

## 2025-08-15 RX ADMIN — LOPERAMIDE HYDROCHLORIDE 2 MG: 2 CAPSULE ORAL at 12:38

## 2025-08-15 RX ADMIN — PANTOPRAZOLE SODIUM 20 MG: 20 TABLET, DELAYED RELEASE ORAL at 09:24

## 2025-08-15 RX ADMIN — PANTOPRAZOLE SODIUM 20 MG: 20 TABLET, DELAYED RELEASE ORAL at 20:40

## 2025-08-15 RX ADMIN — MAGNESIUM OXIDE TAB 400 MG (241.3 MG ELEMENTAL MG) 1 TABLET: 400 (241.3 MG) TAB at 09:23

## 2025-08-15 RX ADMIN — HEPARIN SODIUM 5000 UNITS: 5000 INJECTION, SOLUTION INTRAVENOUS; SUBCUTANEOUS at 21:47

## 2025-08-15 RX ADMIN — DIAZEPAM 10 MG: 5 TABLET ORAL at 23:19

## 2025-08-15 RX ADMIN — ROPINIROLE HYDROCHLORIDE 1 MG: 1 TABLET, FILM COATED ORAL at 14:21

## 2025-08-15 RX ADMIN — POTASSIUM CHLORIDE 40 MEQ: 1.5 POWDER, FOR SOLUTION ORAL at 14:22

## 2025-08-15 RX ADMIN — GABAPENTIN 1200 MG: 400 CAPSULE ORAL at 20:40

## 2025-08-15 RX ADMIN — LOPERAMIDE HYDROCHLORIDE 4 MG: 2 CAPSULE ORAL at 09:23

## 2025-08-15 RX ADMIN — Medication 3 MG: at 20:40

## 2025-08-15 RX ADMIN — ROPINIROLE HYDROCHLORIDE 1 MG: 1 TABLET, FILM COATED ORAL at 09:23

## 2025-08-15 RX ADMIN — MONTELUKAST 10 MG: 10 TABLET, FILM COATED ORAL at 20:40

## 2025-08-15 RX ADMIN — HEPARIN SODIUM 5000 UNITS: 5000 INJECTION, SOLUTION INTRAVENOUS; SUBCUTANEOUS at 14:22

## 2025-08-15 RX ADMIN — ROPINIROLE HYDROCHLORIDE 1 MG: 1 TABLET, FILM COATED ORAL at 20:40

## 2025-08-15 RX ADMIN — LEVALBUTEROL HYDROCHLORIDE 1.25 MG: 1.25 SOLUTION RESPIRATORY (INHALATION) at 19:47

## 2025-08-15 RX ADMIN — POTASSIUM CHLORIDE 40 MEQ: 1.5 POWDER, FOR SOLUTION ORAL at 21:47

## 2025-08-15 RX ADMIN — HYDROXYCHLOROQUINE SULFATE 200 MG: 200 TABLET, FILM COATED ORAL at 20:40

## 2025-08-15 RX ADMIN — Medication 1 DOSE: at 21:37

## 2025-08-15 RX ADMIN — HEPARIN SODIUM 5000 UNITS: 5000 INJECTION, SOLUTION INTRAVENOUS; SUBCUTANEOUS at 06:30

## 2025-08-15 RX ADMIN — MAGNESIUM OXIDE TAB 400 MG (241.3 MG ELEMENTAL MG) 1 TABLET: 400 (241.3 MG) TAB at 20:40

## 2025-08-15 RX ADMIN — Medication: at 07:34

## 2025-08-15 RX ADMIN — NYSTATIN 1 APPLICATION: 100000 POWDER TOPICAL at 09:25

## 2025-08-15 RX ADMIN — DIAZEPAM 10 MG: 5 TABLET ORAL at 14:22

## 2025-08-15 RX ADMIN — LEVALBUTEROL HYDROCHLORIDE 1.25 MG: 1.25 SOLUTION RESPIRATORY (INHALATION) at 13:51

## 2025-08-15 RX ADMIN — POTASSIUM CHLORIDE 40 MEQ: 1.5 POWDER, FOR SOLUTION ORAL at 09:23

## 2025-08-15 ASSESSMENT — PAIN - FUNCTIONAL ASSESSMENT
PAIN_FUNCTIONAL_ASSESSMENT: 0-10

## 2025-08-15 ASSESSMENT — COGNITIVE AND FUNCTIONAL STATUS - GENERAL
STANDING UP FROM CHAIR USING ARMS: A LITTLE
TURNING FROM BACK TO SIDE WHILE IN FLAT BAD: A LOT
TOILETING: TOTAL
EATING MEALS: A LITTLE
MOVING TO AND FROM BED TO CHAIR: A LITTLE
CLIMB 3 TO 5 STEPS WITH RAILING: TOTAL
WALKING IN HOSPITAL ROOM: A LOT
DAILY ACTIVITIY SCORE: 11
PERSONAL GROOMING: A LITTLE
DRESSING REGULAR UPPER BODY CLOTHING: A LOT
MOBILITY SCORE: 13
HELP NEEDED FOR BATHING: TOTAL
DRESSING REGULAR LOWER BODY CLOTHING: TOTAL
MOVING FROM LYING ON BACK TO SITTING ON SIDE OF FLAT BED WITH BEDRAILS: A LOT

## 2025-08-15 ASSESSMENT — PAIN DESCRIPTION - LOCATION: LOCATION: LEG

## 2025-08-15 ASSESSMENT — PAIN SCALES - GENERAL
PAINLEVEL_OUTOF10: 0 - NO PAIN
PAINLEVEL_OUTOF10: 8
PAINLEVEL_OUTOF10: 8

## 2025-08-15 ASSESSMENT — PAIN DESCRIPTION - DESCRIPTORS: DESCRIPTORS: ACHING

## 2025-08-15 NOTE — PROGRESS NOTES
Occupational Therapy    Occupational Therapy Treatment    Name: Emilee Dempsey  MRN: [id number]  Department: [facility]  Room: 175175-A  Date: 08/15/25  Time Calculation  Start Time: 1124  Stop Time: 1134  Time Calculation (min): 10 min    Assessment:  OT Assessment: Pt. progressing toward functional goals, will continue with OT treatment plan to address goals/promote independence with adl/transfers/mobility.  Prognosis: Good  Barriers to Discharge Home: Physical needs  End of Session Communication: Bedside nurse  End of Session Patient Position: Up in chair, Alarm off, not on at start of session  Plan:  Treatment Interventions: ADL retraining, Functional transfer training, Endurance training, Compensatory technique education  OT Frequency for Current Admission: 3 times per week during this acute inpatient hospitalization  OT Discharge Recommendations: Moderate intensity level of continued care, Based on current functional status and rehab potential, patient is anticipated to tolerate and benefit from 5 or more days per week of skilled rehabilitative therapy after discharge from this acute inpatient hospitalization  OT Recommended Transfer Status: Assist of 1  OT - OK to Discharge: Yes (to next level of care when cleared by medical team)    Subjective     OT Visit Info:  OT Received On: 08/15/25  General:  General  Co-Treatment: PT  Co-Treatment Reason: to maximize patient safety/abilities  Prior to Session Communication: Bedside nurse  Patient Position Received: Up in chair, Alarm off, not on at start of session  General Comment: pt. agreeable to therapy interventions  Precautions:  Precautions Comment: VSS, tele     Date/Time Vitals Session Patient Position Pulse Resp SpO2 BP MAP (mmHg)    08/15/25 1400 --  --  70  32  94 %  --  --     08/15/25 1500 --  --  57  20  100 %  --  --           Pain Assessment:  Pain Assessment  Pain Assessment: 0-10  0-10 (Numeric) Pain Score:  (pt. c/o 7/10 hips/back pain, pt.  reported some relief with repositioning)    Objective   Cognition:  Overall Cognitive Status: Within Functional Limits  Orientation Level: Oriented X4      Functional Standing Tolerance:  Functional Standing Tolerance  Functional Standing Tolerance Comments: wh. walker support, cga in preparation for functional mobility, cga      Functional Mobility:  Functional Mobility  Functional Mobility Performed:  (cga, wh. walker to mobilize x few steps within hospital room, pt. fatigued today, unable to participate in further therapeutic activity)      Outcome Measures:  [facility] Daily Activity  Putting on and taking off regular lower body clothing: Total  Bathing (including washing, rinsing, drying): Total  Putting on and taking off regular upper body clothing: A lot  Toileting, which includes using toilet, bedpan or urinal: Total  Taking care of personal grooming such as brushing teeth: A little  Eating Meals: A little  Daily Activity - Total Score: 11         and Early Mobility/Exercise Safety Screen: Proceed with mobilization - No exclusion criteria met  ICU Mobility Scale: Walking with assistance of 1 person [8]    Education Documentation  Precautions, taught by Wanda Morgan OT at 8/15/2025  3:48 PM.  Learner: Patient  Readiness: Acceptance  Method: Explanation  Response: Verbalizes Understanding, Needs Reinforcement  Comment: energy conservation    ADL Training, taught by Wanda Morgan OT at 8/15/2025  3:48 PM.  Learner: Patient  Readiness: Acceptance  Method: Explanation  Response: Verbalizes Understanding, Needs Reinforcement  Comment: energy conservation        Goals:  Encounter Problems       Encounter Problems (Active)       OT Goals       Increase functional mobility and  functional transfers to supervision for bed/chair/toilet with dme prn   (Progressing)       Start:  08/13/25    Expected End:  08/27/25            increase bue ther ex/activity x 7-10 minutes and increase standing tolerance x 3-5 minutes with  supervision to promote greater activity tolerance for assist with adl.   (Progressing)       Start:  08/13/25    Expected End:  08/27/25            Increase ub/lb dressing to supervision with dme prn  (Progressing)       Start:  08/13/25    Expected End:  08/27/25            Increase toileting to supervision with dme prn  (Progressing)       Start:  08/13/25    Expected End:  08/27/25            pt. to apply ec/ws techniques with minimal cues to all mobility/transfer/adl to decrease fatigue/promote efficient use of energy toward completion of functional tasks.  (Progressing)       Start:  08/13/25    Expected End:  08/27/25

## 2025-08-15 NOTE — PROGRESS NOTES
"Nutrition Assessment       Nutrition Intake Since Last RDN Visit:  Energy Intake: Good > 75 %  Pain affecting nutrition status: N/A  Food and Nutrient History: Pt sitting in chair at visit.  Pt reports eating well, getting up in room to use bathroom.  Feeling better.  Regular diet ordered yesterday morning.  No intolerances noted, No GI issues.       Anthropometrics:  Height: (!) 154.9 cm (5' 0.98\")   Weight: 110 kg (242 lb 4.6 oz)   BMI (Calculated): 45.8  IBW/kg (Dietitian Calculated): 47.7 kg  Percent of IBW: 230 %      Weight Change % This Admission:  Weight History / % Weight Change: 8/13/25 110kg, 8/12/25 110kg    Nutrition Focused Physical Exam Findings:    Subcutaneous Fat Loss:   Defer Subcutaneous Fat Loss Assessment: Defer all  Defer All Reason: not appropriate at this time  Orbital Fat Pads: Well nourished (slightly bulging fat pads)  Buccal Fat Pads: Well nourished (full, rounded cheeks)  Triceps: Well nourished (ample fat tissue)  Ribs: Defer  Muscle Wasting:  Defer Muscle Wasting Assessment: Defer all  Defer All Reason: not appropriate at this time  Temporalis: Well nourished (well-defined muscle)  Pectoralis (Clavicular Region): Well nourished (clavicle not visible)  Deltoid/Trapezius: Well nourished (rounded appearance at arm, shoulder, neck)  Interosseous: Defer  Trapezius/Infraspinatus/Supraspinatus (Scapular Region): Defer  Quadriceps: Defer  Gastrocnemius: Defer  Edema:  Edema: +1 trace (non pitting)  Edema Location: non pitting generalized and BUE; 1+ BLE  Physical Findings:  Skin: Positive (sacrum pressure injury; L jaw wound noted 8/12; BLE red)    Nutrition Significant Labs:  BMP Trend:   Results from last 7 days   Lab Units 08/15/25  0526 08/14/25  0412 08/13/25  1532 08/13/25  0530   GLUCOSE mg/dL 93 83 105* 94   CALCIUM mg/dL 8.9 9.0 8.9 8.8   SODIUM mmol/L 140 139 139 142   POTASSIUM mmol/L 3.4* 3.3* 3.1* 2.7*   CO2 mmol/L 27 30 28 33*   CHLORIDE mmol/L 105 100 101 100   BUN mg/dL 16 15 " 13 11   CREATININE mg/dL 0.90 1.01 0.90 1.13*    , BG POCT trend:   Results from last 7 days   Lab Units 08/15/25  0743 08/15/25  0255 08/14/25  1946 08/14/25  1605 08/14/25  1149   POCT GLUCOSE mg/dL 103* 93 97 101* 94        Nutrition Specific Medications:  Reviewed     I/O:   Last BM Date: 08/14/25; Stool Appearance: Soft (08/15/25 0734)    Dietary Orders (From admission, onward)       Start     Ordered    08/14/25 0742  Adult diet Regular  Diet effective now        Question:  Diet type  Answer:  Regular    08/14/25 0741    08/10/25 0226  May Participate in Room Service With Assistance  ( ROOM SERVICE MAY PARTICIPATE WITH ASSISTANCE)  Once        Question:  .  Answer:  Yes    08/10/25 0225                     Estimated Needs:      Method for Estimating Needs: 1300-1430kcals (27-30kcals/kg IBW)     Method for Estimating 24 Hour Protein Needs: 57-67g (1.2-1.4g/kg IBW)     Method for Estimating 24 Hour Fluid Needs: 1 mL/kcal or as per MD  Patient on Order Fluid Restriction: No        Nutrition Diagnosis   Malnutrition Diagnosis  Patient has Malnutrition Diagnosis: No    Nutrition Diagnosis  Patient has Nutrition Diagnosis: No  Diagnosis Status (1): Resolved  Nutrition Diagnosis 1: Inadequate protein energy intake  Related to (1): altered mental status  As Evidenced by (1): NPO x3 days       Nutrition Interventions/Recommendations   Nutrition prescription for oral nutrition    Nutrition Recommendations:  Individualized Nutrition Prescription Provided for : Regular diet as ordered. Will order ONS If meal intakes avg <75%    Nutrition Interventions/Goals:   Meals and Snacks: General healthful diet  Goal: consume 3 meals per day    Education Documentation  Pt had no diet related questions     Nutrition Monitoring and Evaluation   Intake / Amount of food: Consumes at least 75% or more of meals/snacks/supplements, Meets > 75% estimated energy needs    Body Weight: Body weight - Maintain stable weight, Body weight -  Weight reduction from fluids, as needed    Electrolyte and Renal Panel: Electrolytes within normal limits  Glucose/Endocrine Profile: Glucose within normal limits ( mg/dL)    Skin Finding: Promote intact skin - Promote skin integrity  Edema Finding: +1 Pitting edema  Criteria: reduce edema    Goal Status: New goal(s) identified    Time Spent (min): 30 minutes

## 2025-08-15 NOTE — DISCHARGE INSTRUCTIONS
Discontinue all antibiotics including amoxicillin on discharge  Continue home medications as previously tolerated and follow-up with PCP, pulmonologist, cardiologist, and rheumatologist within 1 to 2 weeks of hospitalization  Referral for pain medicine provider has been placed.  Follow-up with pain medicine within 1 to 2 weeks of hospitalization.  Continue CPAP nightly and 2 L O2 on nasal cannula continuous    Instructions for Safe Use of Opioids  Take as Prescribed:  Take the medication exactly as directed by your healthcare provider. Do not increase the dose or frequency without consulting your doctor.  Avoid Alcohol and Sedatives:  Do not drink alcohol or use other sedating medications (such as benzodiazepines) while taking opioids, as this increases the risk of serious side effects including respiratory depression.  Be Aware of Side Effects:  Common side effects include drowsiness, dizziness, constipation, nausea, and confusion. If you experience difficulty breathing, severe sedation, or allergic reactions, seek emergency medical help immediately.  Do Not Drive or Operate Machinery:  Opioids can impair your ability to perform tasks requiring alertness. Avoid driving or operating heavy machinery until you know how the medication affects you.  Store Safely:  Keep opioids in a secure place out of reach of children, pets, and others. Do not share your medication with anyone else.  Avoid Mixing with Other Medications:  Inform your healthcare provider about all other medications, including over-the-counter drugs and supplements, to prevent harmful interactions.  Limit Use Duration:  Use opioids for the shortest time possible to manage your pain. Discuss alternative pain management strategies with your provider as needed.  Dispose Properly:  When you no longer need the medication, dispose of it safely according to local guidelines or pharmacy take-back programs.  Report Concerns:  Contact your healthcare provider if  you experience increased pain, signs of dependence, withdrawal symptoms, or if the medication seems less effective.

## 2025-08-15 NOTE — DISCHARGE SUMMARY
Discharge Diagnosis  Acute metabolic encephalopathy 2/2 unintentional opioid overuse  Pulmonary arterial hypertension             Issues Requiring Follow-Up  PAH and acute metabolic encephalopathy 2/2 unintentional opiate overuse    Discharge Meds     Medication List      CHANGE how you take these medications     buPROPion  mg 24 hr tablet; Commonly known as: Wellbutrin XL; What   changed: Another medication with the same name was removed. Continue   taking this medication, and follow the directions you see here.     CONTINUE taking these medications     acetaminophen 500 mg tablet; Commonly known as: Tylenol   albuterol 90 mcg/actuation inhaler   aspirin 81 mg chewable tablet   biotin 2,500 mcg capsule   * bumetanide 2 mg tablet; Commonly known as: Bumex   * bumetanide 2 mg tablet; Commonly known as: Bumex   calcium carbonate 500 mg (200 mg elemental) chewable tablet; Commonly   known as: Tums   carboxymethylcellulose 1 % ophthalmic solution dropperette; Commonly   known as: Refresh Celluvisc   coenzyme Q-10 200 mg capsule   cyproheptadine 4 mg tablet; Commonly known as: Periactin   desvenlafaxine 100 mg 24 hr tablet; Commonly known as: Pristiq   diazePAM 10 mg tablet; Commonly known as: Valium   diphenhydrAMINE 50 mg capsule; Commonly known as: BENADryl   eptinezumab injection; Commonly known as: Vyepti   FeroSuL 325 mg (65 mg iron) tablet; Generic drug: ferrous sulfate   fluocinonide 0.05 % external solution; Commonly known as: Lidex   fluticasone 50 mcg/actuation nasal spray; Commonly known as: Flonase   folic acid 1 mg tablet; Commonly known as: Folvite   gabapentin 400 mg capsule; Commonly known as: Neurontin   hydroxychloroquine 200 mg tablet; Commonly known as: Plaquenil   icosapent ethyL 1 gram capsule; Commonly known as: Vascepa   ketoconazole 2 % shampoo; Commonly known as: NIZOral   levalbuterol 1.25 mg/3 mL nebulizer solution; Commonly known as: Xopenex   Lidocaine Viscous 2 % solution; Generic  drug: lidocaine   * loperamide 2 mg capsule; Commonly known as: Imodium   * loperamide 2 mg capsule; Commonly known as: Imodium   loratadine 10 mg tablet; Commonly known as: Claritin   magnesium oxide 400 mg (241.3 mg elemental) tablet; Commonly known as:   Mag-Ox   melatonin 3 mg tablet   metOLazone 5 mg tablet; Commonly known as: Zaroxolyn   milnacipran 100 mg tablet; Commonly known as: SavElla   moisturizing mouth solution; Commonly known as: Biotene Oral Dry Mouth   montelukast 10 mg tablet; Commonly known as: Singulair   multivitamin with minerals tablet   NEURIVA PLUS BRAIN PERFORMANCE ORAL   ondansetron 8 mg tablet; Commonly known as: Zofran   One Daily Essential tablet; Generic drug: multivitamin   Opsumit 10 mg tablet tablet; Generic drug: macitentan   oxyCODONE 5 mg immediate release tablet; Commonly known as: Roxicodone   pantoprazole 20 mg EC tablet; Commonly known as: ProtoNix   potassium chloride 20 mEq packet; Commonly known as: Klor-Con   Refresh Optive Advanced 0.5-1-0.5 % drops; Generic drug:   [id number]   Rexulti 3 mg tablet; Generic drug: brexpiprazole   rOPINIRole 1 mg tablet; Commonly known as: Requip   Saphnelo; Generic drug: anifrolumab-fnia   sildenafil 20 mg tablet; Commonly known as: Revatio   sotatercept-csrk 45 mg kit   sucralfate 100 mg/mL suspension; Commonly known as: Carafate   sulfaSALAzine 500 mg tablet; Commonly known as: Azulfidine   tiZANidine 4 mg tablet; Commonly known as: Zanaflex   traZODone 150 mg tablet; Commonly known as: Desyrel   triamcinolone 0.1 % ointment; Commonly known as: Kenalog   Ubrelvy 100 mg tablet; Generic drug: ubrogepant   Vitamins B Complex tablet; Generic drug: vitamin B complex  * This list has 4 medication(s) that are the same as other medications   prescribed for you. Read the directions carefully, and ask your doctor or   other care provider to review them with you.     STOP taking these medications     amoxicillin-clavulanate  875-125 mg tablet; Commonly known as: Augmentin   ketotifen 0.025 % (0.035 %) ophthalmic solution; Commonly known as:   Zaditor   lidocaine 5 % ointment; Commonly known as: Xylocaine   LORazepam 2 mg tablet; Commonly known as: Ativan   mometasone-formoterol 200-5 mcg/actuation inhaler; Commonly known as:   Dulera 200   norethindrone 5 mg tablet; Commonly known as: Aygestin   predniSONE 5 mg tablet; Commonly known as: Deltasone   spironolactone 100 mg tablet; Commonly known as: Aldactone       Test Results Pending At Discharge  Pending Labs       Order Current Status    Drug Screen, Urine Collected (08/12/25 3960)    Lyme disease, PCR In process            Hospital Course  46yo F w/ PMH SLE, lupus nephritis, CKD, HFpEF, HTN, HLD, pHTN presented to [facility] ICU on 8/9/25 w/ AHRF, hypercapnia, acute encephalopathy, and rhabdo. Per initial report: She was found unresponsive and down in her room. Pt was stabilized in ICU. Mentation, CHACHA and rhabdo improved and subsequently stable enough to transfer to general medicine floor for further management.  While admitted to the floor, her mentation, rhabdomyolysis, CHACHA, hypercapnia and hypoxemia improved.  She was also started on antibiotics for BLE cellulitis.  She completed a course of antibiotics and cellulitis improved.  Patient is back to her baseline 2 L O2 NC with nightly CPAP requirement.  Based on PT eval, patient highly recommended to transition care to SNF on discharge.  Patient is recommended to continue home medications as previously tolerated and follow-up with PCP, pulmonologist, cardiologist and rheumatologist within 1 to 2 weeks of hospitalization.  She is medically stable and cleared for discharge.     Pertinent Physical Exam At Time of Discharge  Physical Exam  Constitutional: Well developed, A&Ox3, no acute distress, alert and cooperative  Eyes: EOMI, clear sclera  Respiratory/Thorax: CTAB, good chest expansion  Cardiovascular: Regular rate, regular  rhythm  Gastrointestinal: Nondistended, soft, non-tender  Extremities: normal extremities, no cyanosis edema, regressing BLE erythema  Skin: Warm and dry  Outpatient Follow-Up  No future appointments.      Quentin Jewell MD

## 2025-08-15 NOTE — PROGRESS NOTES
Met with patient at bedside. She gave choice of [facility]. Placed referral in careport. Awaiting acceptance.       8/15 145pm  [facility] will not have a bed until mid next week. Informed patient. New choices received #1 [facility] and [facility]2 [facility]. Referral placed in careport.      8/15 340pm  [facility] able to accept. Requested direct precert team start precert for this patient.

## 2025-08-15 NOTE — HOSPITAL COURSE
44yo F w/ PMH SLE, lupus nephritis, CKD, HFpEF, HTN, HLD, pHTN presented to [facility] ICU on 8/9/25 w/ AHRF, hypercapnia, acute encephalopathy, and rhabdo. Per initial report: She was found unresponsive and down in her room. Pt was stabilized in ICU. Mentation, CHACHA and rhabdo improved and subsequently stable enough to transfer to general medicine floor for further management.  While admitted to the floor, her mentation, rhabdomyolysis, CHACHA, hypercapnia and hypoxemia improved.  She was also started on antibiotics for BLE cellulitis.  She completed a course of antibiotics and cellulitis improved.  Patient is back to her baseline 2 L O2 NC with nightly CPAP requirement.  Based on PT eval, patient highly recommended to transition care to SNF on discharge.  Patient is recommended to continue home medications as previously tolerated and follow-up with PCP, pulmonologist, cardiologist and rheumatologist within 1 to 2 weeks of hospitalization.  She is medically stable and cleared for discharge.

## 2025-08-15 NOTE — PROGRESS NOTES
Physical Therapy    Physical Therapy Treatment    Patient Name: Emilee Dempsey  MRN: [id number]  Today's Date: 8/15/2025  Time Calculation  Start Time: 1125  Stop Time: 1134  Time Calculation (min): 9 min     175/175-A    Assessment/Plan   PT Assessment  PT Assessment Results: Decreased strength, Decreased endurance, Impaired balance, Decreased mobility  Rehab Prognosis: Good  Barriers to Discharge Home: Physical needs  Physical Needs: High falls risk due to function or environment  Evaluation/Treatment Tolerance: Patient limited by fatigue, Patient limited by pain  Medical Staff Made Aware: Yes  End of Session Communication: Bedside nurse  Assessment Comment:  (Pt continues to be cooperative throughout session. Still requires CGA/Aamir for functional mobility attempts 2nd deconditioning/weakness. Will continue per POC to further maximize LE strength, balance and gait.)  End of Session Patient Position: Up in chair, Alarm off, not on at start of session     PT Plan  Treatment/Interventions: Bed mobility, Transfer training, Gait training  PT Plan: Ongoing PT  PT Frequency for Current Admission: 3 times per week during this acute inpatient hospitalization  PT Discharge Recommendations: Moderate intensity level of continued care, Based on current functional status and rehab potential, patient is anticipated to tolerate and benefit from 5 or more days per week of skilled rehabilitative therapy after discharge from this acute inpatient hospitalization  PT Recommended Transfer Status: Assist x2  PT - OK to Discharge: Yes    Current Problem:  Problem List[1]    General Visit Information:   PT  Visit  PT Received On: 08/15/25  Response to Previous Treatment: Patient with no complaints from previous session.  General  Co-Treatment: OT  Co-Treatment Reason: possible two person assist, maximize functional mobility  Prior to Session Communication: Bedside nurse  Patient Position Received: Up in chair, Alarm off, not on at start  of session  General Comment:  (Pt cleared for therapy by nursing. Pt UIC, agreeable for standing/gait attempts, but states overall not feeling well.)  Subjective     Precautions:  Precautions  Medical Precautions: Fall precautions (tele, bhatia, o2 3 L/min)       Objective     Pain:  Pain Assessment  Pain Assessment: 0-10  0-10 (Numeric) Pain Score:  (no numeric stated, but pt c/o generalized discomfort)  Pain Interventions: Repositioned  Response to Interventions: Content/relaxed    Cognition:  Cognition  Overall Cognitive Status: Within Functional Limits      Activity Tolerance:  Activity Tolerance  Endurance: Decreased tolerance for upright activites    Treatments:        Ambulation/Gait Training 1  Comments/Distance (ft) 1: Pt ambulates 5'x2, minAx1 for WW management. Wide THANH, step through gait, slow america. Pt /c mild SOB, improves /c seated rest <30sec. Pt declines further gait trials this date 2nd fatigue/pain.  Transfers  Transfer: Yes  Transfer 1  Trials/Comments 1: Sit-stand /c CGA, UE support.          Outcome Measures:     [facility] Basic Mobility  Turning from your back to your side while in a flat bed without using bedrails: A lot  Moving from lying on your back to sitting on the side of a flat bed without using bedrails: A lot  Moving to and from bed to chair (including a wheelchair): A little  Standing up from a chair using your arms (e.g. wheelchair or bedside chair): A little  To walk in hospital room: A lot  Climbing 3-5 steps with railing: Total  Basic Mobility - Total Score: 13                 Education Documentation  Precautions, taught by Lisette Burnett, PT at 8/15/2025  3:12 PM.  Learner: Patient  Readiness: Acceptance  Method: Explanation  Response: Verbalizes Understanding, Needs Reinforcement  Comment: PT POC    Mobility Training, taught by Lisette Burnett, PT at 8/15/2025  3:12 PM.  Learner: Patient  Readiness: Acceptance  Method: Explanation  Response: Verbalizes Understanding, Needs  Reinforcement  Comment: PT POC    Education Comments  No comments found.           EDUCATION:     Encounter Problems       Encounter Problems (Active)       PT Problem       STG - Pt will transition supine <> sitting with min A x 1  (Progressing)       Start:  08/13/25    Expected End:  08/27/25            STG - Pt will transfer STS with mod A x 1  (Met)       Start:  08/13/25    Expected End:  08/27/25    Resolved:  08/15/25    Updated to: STG - Pt will transfer STS with supervision    Update reason: met         STG - Pt will amb 10' using RW with min A x 1  (Progressing)       Start:  08/13/25    Expected End:  08/27/25            STG - Pt will transfer STS with supervision (Progressing)       Start:  08/15/25    Expected End:  08/27/25                            [1]   Patient Active Problem List  Diagnosis    Idiopathic thrombocytopenic purpura (Multi)    Cushing disease (Multi)    Rheumatoid arthritis of multiple sites with negative rheumatoid factor (Multi)    Abnormal uterine bleeding    Acute on chronic right-sided heart failure    Non-cardiac chest pain    Chronic diarrhea    Fibromyalgia    Chronic respiratory failure with hypoxia    DJD (degenerative joint disease)    Dry eye syndrome of both eyes    Essential hypertension, benign    Generalized anxiety disorder    GERD (gastroesophageal reflux disease)    HLD (hyperlipidemia)    Hypokalemia    Irregular menses    Irritable bowel syndrome    Morbid obesity (Multi)    Pulmonary arterial hypertension (Multi)    Premature atrial complexes    PVC (premature ventricular contraction)    Raynaud disease    Recurrent major depressive disorder, in partial remission    Restless leg syndrome    Right heart failure due to pulmonary hypertension    S/P laparoscopic cholecystectomy    S/P splenectomy    Shortness of breath    Lupus erythematosus    VALDEZ (obstructive sleep apnea)    Asthma    Tricuspid regurgitation    Pre-op evaluation    Pulmonary hypertension (Multi)     Altered mental status, unspecified altered mental status type

## 2025-08-16 LAB
ALBUMIN SERPL BCP-MCNC: 3.3 G/DL (ref 3.4–5)
ANION GAP SERPL CALC-SCNC: 10 MMOL/L (ref 10–20)
B BURGDOR DNA SPEC QL NAA+PROBE: NOT DETECTED
BASOPHILS # BLD AUTO: 0.14 X10*3/UL (ref 0–0.1)
BASOPHILS NFR BLD AUTO: 1.1 %
BUN SERPL-MCNC: 13 MG/DL (ref 6–23)
CALCIUM SERPL-MCNC: 9.1 MG/DL (ref 8.6–10.3)
CHLORIDE SERPL-SCNC: 106 MMOL/L (ref 98–107)
CO2 SERPL-SCNC: 26 MMOL/L (ref 21–32)
CREAT SERPL-MCNC: 0.94 MG/DL (ref 0.5–1.05)
EGFRCR SERPLBLD CKD-EPI 2021: 77 ML/MIN/1.73M*2
EOSINOPHIL # BLD AUTO: 0.52 X10*3/UL (ref 0–0.7)
EOSINOPHIL NFR BLD AUTO: 4.2 %
ERYTHROCYTE [DISTWIDTH] IN BLOOD BY AUTOMATED COUNT: 21.1 % (ref 11.5–14.5)
GLUCOSE BLD MANUAL STRIP-MCNC: 110 MG/DL (ref 74–99)
GLUCOSE BLD MANUAL STRIP-MCNC: 79 MG/DL (ref 74–99)
GLUCOSE BLD MANUAL STRIP-MCNC: 86 MG/DL (ref 74–99)
GLUCOSE SERPL-MCNC: 93 MG/DL (ref 74–99)
HCT VFR BLD AUTO: 46.2 % (ref 36–46)
HGB BLD-MCNC: 14.2 G/DL (ref 12–16)
IMM GRANULOCYTES # BLD AUTO: 0.37 X10*3/UL (ref 0–0.7)
IMM GRANULOCYTES NFR BLD AUTO: 3 % (ref 0–0.9)
LYMPHOCYTES # BLD AUTO: 3.51 X10*3/UL (ref 1.2–4.8)
LYMPHOCYTES NFR BLD AUTO: 28.1 %
MAGNESIUM SERPL-MCNC: 1.65 MG/DL (ref 1.6–2.4)
MCH RBC QN AUTO: 28.2 PG (ref 26–34)
MCHC RBC AUTO-ENTMCNC: 30.7 G/DL (ref 32–36)
MCV RBC AUTO: 92 FL (ref 80–100)
MONOCYTES # BLD AUTO: 1.38 X10*3/UL (ref 0.1–1)
MONOCYTES NFR BLD AUTO: 11.1 %
NEUTROPHILS # BLD AUTO: 6.55 X10*3/UL (ref 1.2–7.7)
NEUTROPHILS NFR BLD AUTO: 52.5 %
NRBC BLD-RTO: 1 /100 WBCS (ref 0–0)
PHOSPHATE SERPL-MCNC: 3.4 MG/DL (ref 2.5–4.9)
PLATELET # BLD AUTO: 317 X10*3/UL (ref 150–450)
POTASSIUM SERPL-SCNC: 3.9 MMOL/L (ref 3.5–5.3)
RBC # BLD AUTO: 5.04 X10*6/UL (ref 4–5.2)
SODIUM SERPL-SCNC: 138 MMOL/L (ref 136–145)
SPECIMEN SOURCE: NORMAL
WBC # BLD AUTO: 12.5 X10*3/UL (ref 4.4–11.3)

## 2025-08-16 PROCEDURE — 2500000004 HC RX 250 GENERAL PHARMACY W/ HCPCS (ALT 636 FOR OP/ED)

## 2025-08-16 PROCEDURE — 84100 ASSAY OF PHOSPHORUS: CPT

## 2025-08-16 PROCEDURE — 82947 ASSAY GLUCOSE BLOOD QUANT: CPT

## 2025-08-16 PROCEDURE — 2500000001 HC RX 250 WO HCPCS SELF ADMINISTERED DRUGS (ALT 637 FOR MEDICARE OP): Performed by: INTERNAL MEDICINE

## 2025-08-16 PROCEDURE — 36415 COLL VENOUS BLD VENIPUNCTURE: CPT

## 2025-08-16 PROCEDURE — 2500000002 HC RX 250 W HCPCS SELF ADMINISTERED DRUGS (ALT 637 FOR MEDICARE OP, ALT 636 FOR OP/ED): Performed by: INTERNAL MEDICINE

## 2025-08-16 PROCEDURE — 1100000001 HC PRIVATE ROOM DAILY

## 2025-08-16 PROCEDURE — 85025 COMPLETE CBC W/AUTO DIFF WBC: CPT

## 2025-08-16 PROCEDURE — 2500000002 HC RX 250 W HCPCS SELF ADMINISTERED DRUGS (ALT 637 FOR MEDICARE OP, ALT 636 FOR OP/ED)

## 2025-08-16 PROCEDURE — 94640 AIRWAY INHALATION TREATMENT: CPT

## 2025-08-16 PROCEDURE — 2500000004 HC RX 250 GENERAL PHARMACY W/ HCPCS (ALT 636 FOR OP/ED): Performed by: INTERNAL MEDICINE

## 2025-08-16 PROCEDURE — 2500000001 HC RX 250 WO HCPCS SELF ADMINISTERED DRUGS (ALT 637 FOR MEDICARE OP)

## 2025-08-16 PROCEDURE — 83735 ASSAY OF MAGNESIUM: CPT

## 2025-08-16 PROCEDURE — 2500000005 HC RX 250 GENERAL PHARMACY W/O HCPCS: Performed by: INTERNAL MEDICINE

## 2025-08-16 PROCEDURE — 99232 SBSQ HOSP IP/OBS MODERATE 35: CPT | Performed by: INTERNAL MEDICINE

## 2025-08-16 RX ORDER — TRAZODONE HYDROCHLORIDE 50 MG/1
150 TABLET ORAL NIGHTLY PRN
Status: DISCONTINUED | OUTPATIENT
Start: 2025-08-16 | End: 2025-08-18 | Stop reason: HOSPADM

## 2025-08-16 RX ORDER — OXYCODONE HYDROCHLORIDE 5 MG/1
5 TABLET ORAL ONCE
Refills: 0 | Status: COMPLETED | OUTPATIENT
Start: 2025-08-16 | End: 2025-08-16

## 2025-08-16 RX ADMIN — GABAPENTIN 1200 MG: 400 CAPSULE ORAL at 20:11

## 2025-08-16 RX ADMIN — OXYCODONE HYDROCHLORIDE 5 MG: 5 TABLET ORAL at 15:22

## 2025-08-16 RX ADMIN — PANTOPRAZOLE SODIUM 20 MG: 20 TABLET, DELAYED RELEASE ORAL at 20:10

## 2025-08-16 RX ADMIN — HYDROXYCHLOROQUINE SULFATE 200 MG: 200 TABLET, FILM COATED ORAL at 09:21

## 2025-08-16 RX ADMIN — NYSTATIN 1 APPLICATION: 100000 POWDER TOPICAL at 09:24

## 2025-08-16 RX ADMIN — TRAZODONE HYDROCHLORIDE 150 MG: 50 TABLET ORAL at 20:10

## 2025-08-16 RX ADMIN — ROPINIROLE HYDROCHLORIDE 1 MG: 1 TABLET, FILM COATED ORAL at 20:11

## 2025-08-16 RX ADMIN — Medication 3 MG: at 20:11

## 2025-08-16 RX ADMIN — MONTELUKAST 10 MG: 10 TABLET, FILM COATED ORAL at 20:11

## 2025-08-16 RX ADMIN — HEPARIN SODIUM 5000 UNITS: 5000 INJECTION, SOLUTION INTRAVENOUS; SUBCUTANEOUS at 14:43

## 2025-08-16 RX ADMIN — TIZANIDINE 4 MG: 4 TABLET ORAL at 10:29

## 2025-08-16 RX ADMIN — OXYCODONE HYDROCHLORIDE 5 MG: 5 TABLET ORAL at 09:27

## 2025-08-16 RX ADMIN — LEVALBUTEROL HYDROCHLORIDE 1.25 MG: 1.25 SOLUTION RESPIRATORY (INHALATION) at 19:23

## 2025-08-16 RX ADMIN — Medication: at 08:00

## 2025-08-16 RX ADMIN — ROPINIROLE HYDROCHLORIDE 1 MG: 1 TABLET, FILM COATED ORAL at 09:21

## 2025-08-16 RX ADMIN — Medication: at 19:28

## 2025-08-16 RX ADMIN — OXYCODONE HYDROCHLORIDE 5 MG: 5 TABLET ORAL at 23:07

## 2025-08-16 RX ADMIN — POTASSIUM CHLORIDE 40 MEQ: 1.5 POWDER, FOR SOLUTION ORAL at 14:44

## 2025-08-16 RX ADMIN — FLUTICASONE PROPIONATE 2 SPRAY: 50 SPRAY NASAL at 20:20

## 2025-08-16 RX ADMIN — Medication: at 12:57

## 2025-08-16 RX ADMIN — ROPINIROLE HYDROCHLORIDE 1 MG: 1 TABLET, FILM COATED ORAL at 14:44

## 2025-08-16 RX ADMIN — LOPERAMIDE HYDROCHLORIDE 2 MG: 2 CAPSULE ORAL at 06:11

## 2025-08-16 RX ADMIN — DIAZEPAM 10 MG: 5 TABLET ORAL at 09:27

## 2025-08-16 RX ADMIN — DESVENLAFAXINE SUCCINATE 100 MG: 25 TABLET, FILM COATED, EXTENDED RELEASE ORAL at 09:21

## 2025-08-16 RX ADMIN — HYDROXYCHLOROQUINE SULFATE 200 MG: 200 TABLET, FILM COATED ORAL at 20:11

## 2025-08-16 RX ADMIN — LOPERAMIDE HYDROCHLORIDE 2 MG: 2 CAPSULE ORAL at 15:22

## 2025-08-16 RX ADMIN — MAGNESIUM OXIDE TAB 400 MG (241.3 MG ELEMENTAL MG) 1 TABLET: 400 (241.3 MG) TAB at 20:11

## 2025-08-16 RX ADMIN — POTASSIUM CHLORIDE 40 MEQ: 1.5 POWDER, FOR SOLUTION ORAL at 09:21

## 2025-08-16 RX ADMIN — LEVALBUTEROL HYDROCHLORIDE 1.25 MG: 1.25 SOLUTION RESPIRATORY (INHALATION) at 13:18

## 2025-08-16 RX ADMIN — PANTOPRAZOLE SODIUM 20 MG: 20 TABLET, DELAYED RELEASE ORAL at 09:21

## 2025-08-16 RX ADMIN — MAGNESIUM OXIDE TAB 400 MG (241.3 MG ELEMENTAL MG) 1 TABLET: 400 (241.3 MG) TAB at 09:21

## 2025-08-16 RX ADMIN — LEVALBUTEROL HYDROCHLORIDE 1.25 MG: 1.25 SOLUTION RESPIRATORY (INHALATION) at 07:58

## 2025-08-16 RX ADMIN — HEPARIN SODIUM 5000 UNITS: 5000 INJECTION, SOLUTION INTRAVENOUS; SUBCUTANEOUS at 21:48

## 2025-08-16 RX ADMIN — HEPARIN SODIUM 5000 UNITS: 5000 INJECTION, SOLUTION INTRAVENOUS; SUBCUTANEOUS at 06:11

## 2025-08-16 RX ADMIN — POTASSIUM CHLORIDE 40 MEQ: 1.5 POWDER, FOR SOLUTION ORAL at 20:10

## 2025-08-16 RX ADMIN — FLUTICASONE PROPIONATE 2 SPRAY: 50 SPRAY NASAL at 09:23

## 2025-08-16 RX ADMIN — LOPERAMIDE HYDROCHLORIDE 2 MG: 2 CAPSULE ORAL at 10:29

## 2025-08-16 RX ADMIN — BUPROPION HYDROCHLORIDE 300 MG: 150 TABLET, EXTENDED RELEASE ORAL at 09:21

## 2025-08-16 ASSESSMENT — PAIN SCALES - GENERAL
PAINLEVEL_OUTOF10: 0 - NO PAIN
PAINLEVEL_OUTOF10: 9
PAINLEVEL_OUTOF10: 9
PAINLEVEL_OUTOF10: 8
PAINLEVEL_OUTOF10: 8

## 2025-08-16 ASSESSMENT — COGNITIVE AND FUNCTIONAL STATUS - GENERAL
STANDING UP FROM CHAIR USING ARMS: A LITTLE
DRESSING REGULAR UPPER BODY CLOTHING: A LITTLE
STANDING UP FROM CHAIR USING ARMS: A LITTLE
MOBILITY SCORE: 17
MOVING TO AND FROM BED TO CHAIR: A LITTLE
TURNING FROM BACK TO SIDE WHILE IN FLAT BAD: A LITTLE
HELP NEEDED FOR BATHING: A LITTLE
WALKING IN HOSPITAL ROOM: A LITTLE
CLIMB 3 TO 5 STEPS WITH RAILING: A LITTLE
MOVING FROM LYING ON BACK TO SITTING ON SIDE OF FLAT BED WITH BEDRAILS: A LITTLE
PERSONAL GROOMING: A LITTLE
CLIMB 3 TO 5 STEPS WITH RAILING: A LOT
MOVING TO AND FROM BED TO CHAIR: A LITTLE
HELP NEEDED FOR BATHING: A LITTLE
WALKING IN HOSPITAL ROOM: A LITTLE
MOVING FROM LYING ON BACK TO SITTING ON SIDE OF FLAT BED WITH BEDRAILS: A LITTLE
DRESSING REGULAR LOWER BODY CLOTHING: A LITTLE
DRESSING REGULAR UPPER BODY CLOTHING: A LITTLE
TOILETING: A LITTLE
MOBILITY SCORE: 18
PERSONAL GROOMING: A LITTLE
TOILETING: A LITTLE
DAILY ACTIVITIY SCORE: 19
DRESSING REGULAR LOWER BODY CLOTHING: A LITTLE
TURNING FROM BACK TO SIDE WHILE IN FLAT BAD: A LITTLE
DAILY ACTIVITIY SCORE: 19

## 2025-08-16 ASSESSMENT — PAIN - FUNCTIONAL ASSESSMENT
PAIN_FUNCTIONAL_ASSESSMENT: 0-10

## 2025-08-16 ASSESSMENT — PAIN DESCRIPTION - ORIENTATION
ORIENTATION: LOWER
ORIENTATION: LOWER
ORIENTATION: LOWER;POSTERIOR

## 2025-08-16 ASSESSMENT — PAIN DESCRIPTION - LOCATION
LOCATION: BACK

## 2025-08-16 ASSESSMENT — PAIN DESCRIPTION - DESCRIPTORS
DESCRIPTORS: ACHING;THROBBING
DESCRIPTORS: THROBBING
DESCRIPTORS: THROBBING;ACHING;STABBING

## 2025-08-16 NOTE — PROGRESS NOTES
Pt has a DC order in to go to [facility]. Sw had the precert team check the status of the auth and it is still pending. Sw notified [facility].  Kevin will also keep pt and family updated on the precert status.

## 2025-08-16 NOTE — PROGRESS NOTES
Patient Name: Emilee Dempsey   YOB: 1980    Subjective:  Pt reporting increased anxiety and increased pain in her bilateral hips.     Objective:    Vitals:    08/16/25 1300 08/16/25 1400 08/16/25 1500 08/16/25 1546   BP:    118/65   BP Location:       Patient Position:       Pulse: 59 67 64 61   Resp: 18  26    Temp:    35.9 °C (96.6 °F)   TempSrc:       SpO2: 100% 96% 98% 97%   Weight:       Height:           Physical Exam:    GEN: Awake and alert. Sitting up in chair.  HEENT: Normocephalic and atraumatic.  Mucous membranes moist.    CARDIO: Regular rate and rhythm.  No murmurs, rubs, or gallops.   RESP: Clear to auscultation b/l.   ABD: +BS x4, soft  MSK: Reporting pain in her bilateral hips  NEURO: A&O X 3. CN II-XII are grossly intact. No focal deficits.   SKIN: Warm and dry, no lesions, no rashes.  PSYCH: Anxious     Scheduled medications  Scheduled Medications[1]  Continuous medications  Continuous Medications[2]  PRN medications  PRN Medications[3]     Assessment and Plan:  Emilee Dempsey is a 45 y.o. 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  Discussed with patient that I do not recommend doing so as the reason patient was initially admitted to the hospital was due to unintentional overdose of these medications.  Patient is resumed on her usual doses of  benzos and oxy and we will keep current orders.   A one time additional dose of oxycodone 5mg oral will be given once.  Monitor to see if she becomes obtunded or lethargic.  This was explained to patient and she understands.      Acute respiratory failure with hypoxemia and hypercapnia secondary to unintentional drug overdose with benzo, oxycodone, and high dose of gabapentin.  This was worsened with acute kidney injury.  Mentation is stable today.  Renal function improved and will resume her home dose gabapentin.    Transient hypoxia at night I suspect is from VALDEZ, continue CPAP at night for patient.  Elevated troponin secondary to rhabdomyolysis, CHACHA and acute respiratory failure.  Echo did not show any wall motion abnormalities.  CHACHA and rhabdomyolysis both improved.  PT and OT evaluation, stable for discharge to SNF.    I spent 25 minutes in the professional and overall care of this patient. More than 50% of this time was spent examining and counseling patient, reviewing plan of care, and coordinating medical care.    Jose C Patricia DO  Senior Attending Physician  [facility]  Clinical Instructor             [1] buPROPion XL, 300 mg, oral, q AM  desvenlafaxine, 100 mg, oral, Daily  fluticasone, 2 spray, Each Nostril, BID  gabapentin, 1,200 mg, oral, Nightly  heparin, 5,000 Units, subcutaneous, q8h  hydroxychloroquine, 200 mg, oral, BID  levalbuterol, 1.25 mg, nebulization, TID  loperamide, 2 mg, oral, TID AC  magnesium oxide, 400 mg of magnesium oxide, oral, BID  melatonin, 3 mg, oral, Nightly  montelukast, 10 mg, oral, Nightly  nystatin, 1 Application, Topical, BID  pantoprazole, 20 mg, oral, BID  perflutren protein A microsphere, 0.5 mL, intravenous, Once in imaging  potassium chloride, 40 mEq, oral, TID  rOPINIRole, 1 mg, oral, TID  sulfur hexafluoride microsphr, 2 mL, intravenous, Once in imaging    [2]    [3] PRN medications: alteplase, dextrose, dextrose, diazePAM, glucagon, glucagon, heparin flush,  levalbuterol, oxyCODONE, oxygen, tiZANidine, traZODone

## 2025-08-16 NOTE — CARE PLAN
The patient's goals for the shift include decrease pain.  The clinical goals for the shift include decrease pain, keep patient safe, and for patient to remain HDS.       Problem: Pain - Adult  Goal: Verbalizes/displays adequate comfort level or baseline comfort level  Outcome: Progressing  Flowsheets (Taken 8/16/2025 1059)  Verbalizes/displays adequate comfort level or baseline comfort level:   Encourage patient to monitor pain and request assistance   Administer analgesics based on type and severity of pain and evaluate response   Assess pain using appropriate pain scale     Problem: Safety - Adult  Goal: Free from fall injury  Outcome: Progressing  Flowsheets (Taken 8/16/2025 1059)  Free from fall injury: Instruct family/caregiver on patient safety     Problem: Discharge Planning  Goal: Discharge to home or other facility with appropriate resources  Outcome: Progressing  Flowsheets (Taken 8/16/2025 1059)  Discharge to home or other facility with appropriate resources: Identify barriers to discharge with patient and caregiver     Problem: Chronic Conditions and Co-morbidities  Goal: Patient's chronic conditions and co-morbidity symptoms are monitored and maintained or improved  Outcome: Progressing  Flowsheets (Taken 8/16/2025 1059)  Care Plan - Patient's Chronic Conditions and Co-Morbidity Symptoms are Monitored and Maintained or Improved: Monitor and assess patient's chronic conditions and comorbid symptoms for stability, deterioration, or improvement     Problem: Nutrition  Goal: Nutrient intake appropriate for maintaining nutritional needs  Outcome: Progressing     Problem: Skin  Goal: Decreased wound size/increased tissue granulation at next dressing change  Outcome: Progressing  Flowsheets (Taken 8/16/2025 1059)  Decreased wound size/increased tissue granulation at next dressing change: Protective dressings over bony prominences  Goal: Participates in plan/prevention/treatment measures  Outcome:  Progressing  Flowsheets (Taken 8/16/2025 1059)  Participates in plan/prevention/treatment measures:   Elevate heels   Increase activity/out of bed for meals  Goal: Prevent/manage excess moisture  Outcome: Progressing  Flowsheets (Taken 8/16/2025 1059)  Prevent/manage excess moisture:   Moisturize dry skin   Cleanse incontinence/protect with barrier cream  Goal: Prevent/minimize sheer/friction injuries  Outcome: Progressing  Flowsheets (Taken 8/16/2025 1059)  Prevent/minimize sheer/friction injuries:   Turn/reposition every 2 hours/use positioning/transfer devices   Use pull sheet   HOB 30 degrees or less  Goal: Promote/optimize nutrition  Outcome: Progressing  Flowsheets (Taken 8/16/2025 1059)  Promote/optimize nutrition: Consume > 50% meals/supplements  Goal: Promote skin healing  Outcome: Progressing  Flowsheets (Taken 8/16/2025 1059)  Promote skin healing: Protective dressings over bony prominences     Problem: Fall/Injury  Goal: Not fall by end of shift  Outcome: Progressing  Goal: Be free from injury by end of the shift  Outcome: Progressing  Goal: Verbalize understanding of personal risk factors for fall in the hospital  Outcome: Progressing  Goal: Verbalize understanding of risk factor reduction measures to prevent injury from fall in the home  Outcome: Progressing  Goal: Use assistive devices by end of the shift  Outcome: Progressing  Goal: Pace activities to prevent fatigue by end of the shift  Outcome: Progressing

## 2025-08-17 LAB
GLUCOSE BLD MANUAL STRIP-MCNC: 112 MG/DL (ref 74–99)
GLUCOSE BLD MANUAL STRIP-MCNC: 119 MG/DL (ref 74–99)
GLUCOSE BLD MANUAL STRIP-MCNC: 123 MG/DL (ref 74–99)
GLUCOSE BLD MANUAL STRIP-MCNC: 97 MG/DL (ref 74–99)
HOLD SPECIMEN: NORMAL
HOLD SPECIMEN: NORMAL
MAGNESIUM SERPL-MCNC: 1.73 MG/DL (ref 1.6–2.4)

## 2025-08-17 PROCEDURE — 36415 COLL VENOUS BLD VENIPUNCTURE: CPT | Performed by: INTERNAL MEDICINE

## 2025-08-17 PROCEDURE — 82947 ASSAY GLUCOSE BLOOD QUANT: CPT

## 2025-08-17 PROCEDURE — 2500000005 HC RX 250 GENERAL PHARMACY W/O HCPCS: Performed by: INTERNAL MEDICINE

## 2025-08-17 PROCEDURE — 94640 AIRWAY INHALATION TREATMENT: CPT

## 2025-08-17 PROCEDURE — 2500000002 HC RX 250 W HCPCS SELF ADMINISTERED DRUGS (ALT 637 FOR MEDICARE OP, ALT 636 FOR OP/ED): Performed by: INTERNAL MEDICINE

## 2025-08-17 PROCEDURE — 1100000001 HC PRIVATE ROOM DAILY

## 2025-08-17 PROCEDURE — 83735 ASSAY OF MAGNESIUM: CPT | Performed by: INTERNAL MEDICINE

## 2025-08-17 PROCEDURE — 2500000004 HC RX 250 GENERAL PHARMACY W/ HCPCS (ALT 636 FOR OP/ED): Performed by: INTERNAL MEDICINE

## 2025-08-17 PROCEDURE — 99231 SBSQ HOSP IP/OBS SF/LOW 25: CPT | Performed by: INTERNAL MEDICINE

## 2025-08-17 PROCEDURE — 2500000001 HC RX 250 WO HCPCS SELF ADMINISTERED DRUGS (ALT 637 FOR MEDICARE OP): Performed by: INTERNAL MEDICINE

## 2025-08-17 RX ORDER — LOPERAMIDE HYDROCHLORIDE 2 MG/1
4 CAPSULE ORAL
Status: DISCONTINUED | OUTPATIENT
Start: 2025-08-17 | End: 2025-08-18 | Stop reason: HOSPADM

## 2025-08-17 RX ORDER — LOPERAMIDE HYDROCHLORIDE 2 MG/1
4 CAPSULE ORAL
Status: DISCONTINUED | OUTPATIENT
Start: 2025-08-18 | End: 2025-08-17

## 2025-08-17 RX ADMIN — POTASSIUM CHLORIDE 40 MEQ: 1.5 POWDER, FOR SOLUTION ORAL at 09:07

## 2025-08-17 RX ADMIN — LEVALBUTEROL HYDROCHLORIDE 1.25 MG: 1.25 SOLUTION RESPIRATORY (INHALATION) at 20:23

## 2025-08-17 RX ADMIN — Medication 1 DOSE: at 07:48

## 2025-08-17 RX ADMIN — Medication: at 20:25

## 2025-08-17 RX ADMIN — LEVALBUTEROL HYDROCHLORIDE 1.25 MG: 1.25 SOLUTION RESPIRATORY (INHALATION) at 14:45

## 2025-08-17 RX ADMIN — Medication: at 14:45

## 2025-08-17 RX ADMIN — ROPINIROLE HYDROCHLORIDE 1 MG: 1 TABLET, FILM COATED ORAL at 20:42

## 2025-08-17 RX ADMIN — POTASSIUM CHLORIDE 40 MEQ: 1.5 POWDER, FOR SOLUTION ORAL at 20:42

## 2025-08-17 RX ADMIN — MAGNESIUM OXIDE TAB 400 MG (241.3 MG ELEMENTAL MG) 1 TABLET: 400 (241.3 MG) TAB at 09:07

## 2025-08-17 RX ADMIN — HYDROXYCHLOROQUINE SULFATE 200 MG: 200 TABLET, FILM COATED ORAL at 20:42

## 2025-08-17 RX ADMIN — MAGNESIUM OXIDE TAB 400 MG (241.3 MG ELEMENTAL MG) 1 TABLET: 400 (241.3 MG) TAB at 20:42

## 2025-08-17 RX ADMIN — OXYCODONE HYDROCHLORIDE 5 MG: 5 TABLET ORAL at 20:55

## 2025-08-17 RX ADMIN — OXYCODONE HYDROCHLORIDE 5 MG: 5 TABLET ORAL at 09:15

## 2025-08-17 RX ADMIN — ROPINIROLE HYDROCHLORIDE 1 MG: 1 TABLET, FILM COATED ORAL at 15:38

## 2025-08-17 RX ADMIN — GABAPENTIN 1200 MG: 400 CAPSULE ORAL at 20:42

## 2025-08-17 RX ADMIN — HEPARIN SODIUM 5000 UNITS: 5000 INJECTION, SOLUTION INTRAVENOUS; SUBCUTANEOUS at 06:18

## 2025-08-17 RX ADMIN — FLUTICASONE PROPIONATE 2 SPRAY: 50 SPRAY NASAL at 09:16

## 2025-08-17 RX ADMIN — HEPARIN SODIUM 5000 UNITS: 5000 INJECTION, SOLUTION INTRAVENOUS; SUBCUTANEOUS at 22:16

## 2025-08-17 RX ADMIN — NYSTATIN 1 APPLICATION: 100000 POWDER TOPICAL at 09:16

## 2025-08-17 RX ADMIN — POTASSIUM CHLORIDE 40 MEQ: 1.5 POWDER, FOR SOLUTION ORAL at 15:38

## 2025-08-17 RX ADMIN — LOPERAMIDE HYDROCHLORIDE 2 MG: 2 CAPSULE ORAL at 06:18

## 2025-08-17 RX ADMIN — DESVENLAFAXINE SUCCINATE 100 MG: 25 TABLET, FILM COATED, EXTENDED RELEASE ORAL at 09:25

## 2025-08-17 RX ADMIN — ROPINIROLE HYDROCHLORIDE 1 MG: 1 TABLET, FILM COATED ORAL at 09:07

## 2025-08-17 RX ADMIN — NYSTATIN 1 APPLICATION: 100000 POWDER TOPICAL at 20:47

## 2025-08-17 RX ADMIN — LEVALBUTEROL HYDROCHLORIDE 1.25 MG: 1.25 SOLUTION RESPIRATORY (INHALATION) at 07:47

## 2025-08-17 RX ADMIN — HYDROXYCHLOROQUINE SULFATE 200 MG: 200 TABLET, FILM COATED ORAL at 09:07

## 2025-08-17 RX ADMIN — HEPARIN SODIUM 5000 UNITS: 5000 INJECTION, SOLUTION INTRAVENOUS; SUBCUTANEOUS at 15:38

## 2025-08-17 RX ADMIN — LOPERAMIDE HYDROCHLORIDE 4 MG: 2 CAPSULE ORAL at 17:38

## 2025-08-17 RX ADMIN — DIAZEPAM 10 MG: 5 TABLET ORAL at 15:40

## 2025-08-17 RX ADMIN — LOPERAMIDE HYDROCHLORIDE 2 MG: 2 CAPSULE ORAL at 11:37

## 2025-08-17 RX ADMIN — PANTOPRAZOLE SODIUM 20 MG: 20 TABLET, DELAYED RELEASE ORAL at 09:07

## 2025-08-17 RX ADMIN — PANTOPRAZOLE SODIUM 20 MG: 20 TABLET, DELAYED RELEASE ORAL at 20:43

## 2025-08-17 RX ADMIN — BUPROPION HYDROCHLORIDE 300 MG: 150 TABLET, EXTENDED RELEASE ORAL at 09:07

## 2025-08-17 RX ADMIN — Medication 3 MG: at 20:43

## 2025-08-17 RX ADMIN — MONTELUKAST 10 MG: 10 TABLET, FILM COATED ORAL at 20:43

## 2025-08-17 ASSESSMENT — PAIN - FUNCTIONAL ASSESSMENT
PAIN_FUNCTIONAL_ASSESSMENT: 0-10

## 2025-08-17 ASSESSMENT — PAIN SCALES - GENERAL
PAINLEVEL_OUTOF10: 9
PAINLEVEL_OUTOF10: 0 - NO PAIN
PAINLEVEL_OUTOF10: 0 - NO PAIN
PAINLEVEL_OUTOF10: 8

## 2025-08-17 ASSESSMENT — PAIN DESCRIPTION - LOCATION: LOCATION: LEG

## 2025-08-17 NOTE — PROGRESS NOTES
Patient Name: Emilee Dempsey   YOB: 1980    Subjective:  Pt up in bed, ate breakfast and feeling well.  No new complaints.     Objective:    Vitals:    08/16/25 1949 08/16/25 2356 08/17/25 0508 08/17/25 0741   BP: 133/72 121/81 109/59 133/79   BP Location:    Right arm   Patient Position:    Sitting   Pulse: 65 61 61 62   Resp: 18 17 17    Temp: 36.2 °C (97.2 °F) 36.1 °C (97 °F) 36 °C (96.8 °F) 35.5 °C (95.9 °F)   TempSrc: Temporal Temporal Temporal Temporal   SpO2: 96% 100% 95% 95%   Weight:       Height:           Physical Exam:    GEN: Awake and alert.   HEENT: Normocephalic and atraumatic.  Mucous membranes moist.    CARDIO: Regular rate and rhythm.  No murmurs, rubs, or gallops.   RESP: Clear to auscultation b/l.   ABD: +BS x4, soft  MSK: Reporting pain in her bilateral hips  NEURO: A&O X 3. CN II-XII are grossly intact. No focal deficits.   SKIN: Warm and dry, no lesions, no rashes.  PSYCH: Appropriate in mood and behavior     Scheduled medications  Scheduled Medications[1]  Continuous medications  Continuous Medications[2]  PRN medications  PRN Medications[3]     Assessment and Plan:  Emilee Dempsey is a 45 y.o. 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  Acute respiratory failure with hypoxemia and hypercapnia secondary to unintentional drug overdose with  benzo, oxycodone, and high dose of gabapentin.  This was worsened with acute kidney injury.  CHACHA resolved.  Mentation is stable today.  Continue CPAP at night for patient.  Elevated troponin secondary to rhabdomyolysis, CHACHA and acute respiratory failure.  Echo did not show any wall motion abnormalities.  PT and OT evaluation, stable for discharge to SNF    I spent 25 minutes in the professional and overall care of this patient. More than 50% of this time was spent examining and counseling patient, reviewing plan of care, and coordinating medical care.    Jose C Patricia DO  Senior Attending Physician  [facility]  Clinical Instructor             [1] buPROPion XL, 300 mg, oral, q AM  desvenlafaxine, 100 mg, oral, Daily  fluticasone, 2 spray, Each Nostril, BID  gabapentin, 1,200 mg, oral, Nightly  heparin, 5,000 Units, subcutaneous, q8h  hydroxychloroquine, 200 mg, oral, BID  levalbuterol, 1.25 mg, nebulization, TID  loperamide, 2 mg, oral, TID AC  magnesium oxide, 400 mg of magnesium oxide, oral, BID  melatonin, 3 mg, oral, Nightly  montelukast, 10 mg, oral, Nightly  nystatin, 1 Application, Topical, BID  pantoprazole, 20 mg, oral, BID  perflutren protein A microsphere, 0.5 mL, intravenous, Once in imaging  potassium chloride, 40 mEq, oral, TID  rOPINIRole, 1 mg, oral, TID  sulfur hexafluoride microsphr, 2 mL, intravenous, Once in imaging    [2]    [3] PRN medications: alteplase, dextrose, dextrose, diazePAM, glucagon, glucagon, heparin flush, levalbuterol, oxyCODONE, oxygen, tiZANidine, traZODone

## 2025-08-17 NOTE — CARE PLAN
The patient's goals for the shift include      The clinical goals for the shift include Patient will remain safe this shift    Over the shift, the patient did not make progress toward the following goals. Barriers to progression include weakness. Recommendations to address these barriers include follow poc.

## 2025-08-18 VITALS
BODY MASS INDEX: 45.74 KG/M2 | TEMPERATURE: 96.6 F | DIASTOLIC BLOOD PRESSURE: 56 MMHG | HEART RATE: 67 BPM | HEIGHT: 61 IN | RESPIRATION RATE: 17 BRPM | OXYGEN SATURATION: 95 % | WEIGHT: 242.29 LBS | SYSTOLIC BLOOD PRESSURE: 110 MMHG

## 2025-08-18 VITALS
TEMPERATURE: 96.8 F | RESPIRATION RATE: 16 BRPM | WEIGHT: 242.29 LBS | SYSTOLIC BLOOD PRESSURE: 121 MMHG | HEIGHT: 61 IN | DIASTOLIC BLOOD PRESSURE: 58 MMHG | BODY MASS INDEX: 45.74 KG/M2 | HEART RATE: 63 BPM | OXYGEN SATURATION: 98 %

## 2025-08-18 LAB
GLUCOSE BLD MANUAL STRIP-MCNC: 112 MG/DL (ref 74–99)
GLUCOSE BLD MANUAL STRIP-MCNC: 150 MG/DL (ref 74–99)
HOLD SPECIMEN: NORMAL
HOLD SPECIMEN: NORMAL
MAGNESIUM SERPL-MCNC: 1.69 MG/DL (ref 1.6–2.4)

## 2025-08-18 PROCEDURE — 2500000004 HC RX 250 GENERAL PHARMACY W/ HCPCS (ALT 636 FOR OP/ED): Performed by: INTERNAL MEDICINE

## 2025-08-18 PROCEDURE — 94640 AIRWAY INHALATION TREATMENT: CPT

## 2025-08-18 PROCEDURE — 82947 ASSAY GLUCOSE BLOOD QUANT: CPT

## 2025-08-18 PROCEDURE — 2500000001 HC RX 250 WO HCPCS SELF ADMINISTERED DRUGS (ALT 637 FOR MEDICARE OP): Performed by: INTERNAL MEDICINE

## 2025-08-18 PROCEDURE — 36415 COLL VENOUS BLD VENIPUNCTURE: CPT | Performed by: INTERNAL MEDICINE

## 2025-08-18 PROCEDURE — 83735 ASSAY OF MAGNESIUM: CPT | Performed by: INTERNAL MEDICINE

## 2025-08-18 PROCEDURE — 99231 SBSQ HOSP IP/OBS SF/LOW 25: CPT | Performed by: INTERNAL MEDICINE

## 2025-08-18 PROCEDURE — 2500000002 HC RX 250 W HCPCS SELF ADMINISTERED DRUGS (ALT 637 FOR MEDICARE OP, ALT 636 FOR OP/ED): Performed by: INTERNAL MEDICINE

## 2025-08-18 RX ORDER — TRAZODONE HYDROCHLORIDE 150 MG/1
150 TABLET ORAL NIGHTLY PRN
Start: 2025-08-18

## 2025-08-18 RX ORDER — LOPERAMIDE HYDROCHLORIDE 2 MG/1
4 CAPSULE ORAL
Start: 2025-08-18

## 2025-08-18 RX ADMIN — NYSTATIN 1 APPLICATION: 100000 POWDER TOPICAL at 09:24

## 2025-08-18 RX ADMIN — BUPROPION HYDROCHLORIDE 300 MG: 150 TABLET, EXTENDED RELEASE ORAL at 09:17

## 2025-08-18 RX ADMIN — LEVALBUTEROL HYDROCHLORIDE 1.25 MG: 1.25 SOLUTION RESPIRATORY (INHALATION) at 07:00

## 2025-08-18 RX ADMIN — HYDROXYCHLOROQUINE SULFATE 200 MG: 200 TABLET, FILM COATED ORAL at 09:17

## 2025-08-18 RX ADMIN — LOPERAMIDE HYDROCHLORIDE 4 MG: 2 CAPSULE ORAL at 06:53

## 2025-08-18 RX ADMIN — DESVENLAFAXINE SUCCINATE 100 MG: 25 TABLET, FILM COATED, EXTENDED RELEASE ORAL at 10:11

## 2025-08-18 RX ADMIN — DIAZEPAM 10 MG: 5 TABLET ORAL at 09:18

## 2025-08-18 RX ADMIN — LOPERAMIDE HYDROCHLORIDE 4 MG: 2 CAPSULE ORAL at 12:03

## 2025-08-18 RX ADMIN — MAGNESIUM OXIDE TAB 400 MG (241.3 MG ELEMENTAL MG) 1 TABLET: 400 (241.3 MG) TAB at 09:17

## 2025-08-18 RX ADMIN — PANTOPRAZOLE SODIUM 20 MG: 20 TABLET, DELAYED RELEASE ORAL at 09:13

## 2025-08-18 RX ADMIN — ROPINIROLE HYDROCHLORIDE 1 MG: 1 TABLET, FILM COATED ORAL at 09:17

## 2025-08-18 RX ADMIN — HEPARIN SODIUM 5000 UNITS: 5000 INJECTION, SOLUTION INTRAVENOUS; SUBCUTANEOUS at 06:53

## 2025-08-18 RX ADMIN — POTASSIUM CHLORIDE 40 MEQ: 1.5 POWDER, FOR SOLUTION ORAL at 09:18

## 2025-08-18 RX ADMIN — OXYCODONE HYDROCHLORIDE 5 MG: 5 TABLET ORAL at 09:18

## 2025-08-18 RX ADMIN — LEVALBUTEROL HYDROCHLORIDE 1.25 MG: 1.25 SOLUTION RESPIRATORY (INHALATION) at 12:03

## 2025-08-18 RX ADMIN — FLUTICASONE PROPIONATE 2 SPRAY: 50 SPRAY NASAL at 09:24

## 2025-08-18 RX ADMIN — Medication: at 11:00

## 2025-08-18 ASSESSMENT — COGNITIVE AND FUNCTIONAL STATUS - GENERAL
DRESSING REGULAR UPPER BODY CLOTHING: A LITTLE
HELP NEEDED FOR BATHING: A LITTLE
DAILY ACTIVITIY SCORE: 19
WALKING IN HOSPITAL ROOM: A LITTLE
TOILETING: A LITTLE
MOVING TO AND FROM BED TO CHAIR: A LITTLE
TURNING FROM BACK TO SIDE WHILE IN FLAT BAD: A LITTLE
PERSONAL GROOMING: A LITTLE
MOBILITY SCORE: 18
MOVING FROM LYING ON BACK TO SITTING ON SIDE OF FLAT BED WITH BEDRAILS: A LITTLE
STANDING UP FROM CHAIR USING ARMS: A LITTLE
DRESSING REGULAR LOWER BODY CLOTHING: A LITTLE
CLIMB 3 TO 5 STEPS WITH RAILING: A LITTLE

## 2025-08-18 ASSESSMENT — PAIN SCALES - GENERAL: PAINLEVEL_OUTOF10: 7

## 2025-08-18 ASSESSMENT — PAIN - FUNCTIONAL ASSESSMENT: PAIN_FUNCTIONAL_ASSESSMENT: 0-10

## 2025-08-18 NOTE — CARE PLAN
The patient's goals for the shift include      The clinical goals for the shift include no pain

## 2025-08-18 NOTE — PROGRESS NOTES
Emilee Dempsey is a 45 y.o. female on day 9 of admission presenting with Altered mental status, unspecified altered mental status type.  Record reviewed.  Discharge plan is to [facility].  Received auth, good through 8/27.  Confirmed transport needs for stretcher with O2 at 2L/NC, with nursing.  [place] and AVS completed and sent to facility.  Unit secretary requested to set up transport.  Nursing and Facility updated.  Care Transitions will continue to follow.    11:30 am addendum  Transport to [facility] confirmed.  [facility] accepted and scheduled for 1 pm .  Facility and nursing updated.  Nursing to update patient and family.  Care Transitions will continue to follow.

## 2025-08-18 NOTE — PROGRESS NOTES
Patient Name: Emilee Dempsey   YOB: 1980    Subjective:  Pt awake in bed watching TV.  She has no new complaints or concerns today.      Objective:    Vitals:    08/18/25 0004 08/18/25 0439 08/18/25 0700 08/18/25 0808   BP: 110/56 121/60  112/58   BP Location:       Patient Position:       Pulse: 67 62  63   Resp: 17 17     Temp: 35.9 °C (96.6 °F) 36 °C (96.8 °F)  36.3 °C (97.3 °F)   TempSrc: Temporal Temporal     SpO2: 95% 96% 96% 97%   Weight:       Height:           Physical Exam:    GEN: Awake and alert.   HEENT: Normocephalic and atraumatic.  Mucous membranes moist.    CARDIO: Regular rate and rhythm.  No murmurs, rubs, or gallops.   RESP: Clear to auscultation b/l.   ABD: +BS x4, soft  MSK: Reporting pain in her bilateral hips, pain is controlled.  NEURO: A&O X 3. CN II-XII are grossly intact. No focal deficits.   SKIN: Warm and dry, no lesions, no rashes.  PSYCH: Appropriate in mood and behavior     Scheduled medications  Scheduled Medications[1]  Continuous medications  Continuous Medications[2]  PRN medications  PRN Medications[3]     Assessment and Plan:  Emilee Dempsey is a 45 y.o. 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  Vital signs stable.  Acute respiratory failure with hypoxemia and hypercapnia secondary to unintentional drug overdose with benzo,  oxycodone, and high dose of gabapentin.  This was worsened with acute kidney injury.  CHACHA resolved.  Mentation is stable and back to baseline.  Continue CPAP at night for patient.  Elevated troponin secondary to rhabdomyolysis, CHACHA and acute respiratory failure.  Echo did not show any wall motion abnormalities.  PT and OT evaluation, medically stable for discharge to SNF.    I spent 15 minutes in the professional and overall care of this patient. More than 50% of this time was spent examining and counseling patient, reviewing plan of care, and coordinating medical care.    Jose C Patricia DO  Senior Attending Physician  [facility]  Clinical Instructor             [1] buPROPion XL, 300 mg, oral, q AM  desvenlafaxine, 100 mg, oral, Daily  fluticasone, 2 spray, Each Nostril, BID  gabapentin, 1,200 mg, oral, Nightly  heparin, 5,000 Units, subcutaneous, q8h  hydroxychloroquine, 200 mg, oral, BID  levalbuterol, 1.25 mg, nebulization, TID  loperamide, 4 mg, oral, TID AC  magnesium oxide, 400 mg of magnesium oxide, oral, BID  melatonin, 3 mg, oral, Nightly  montelukast, 10 mg, oral, Nightly  nystatin, 1 Application, Topical, BID  pantoprazole, 20 mg, oral, BID  perflutren protein A microsphere, 0.5 mL, intravenous, Once in imaging  potassium chloride, 40 mEq, oral, TID  rOPINIRole, 1 mg, oral, TID  sulfur hexafluoride microsphr, 2 mL, intravenous, Once in imaging    [2]    [3] PRN medications: alteplase, dextrose, dextrose, diazePAM, glucagon, glucagon, heparin flush, levalbuterol, oxyCODONE, oxygen, tiZANidine, traZODone

## 2025-08-19 LAB — HOLD SPECIMEN: NORMAL

## 2025-09-05 ENCOUNTER — APPOINTMENT (OUTPATIENT)
Dept: PAIN MEDICINE | Facility: CLINIC | Age: 45
End: 2025-09-05
Payer: COMMERCIAL

## (undated) DEVICE — GOWN, SURGICAL, SMARTGOWN, XLARGE, STERILE

## (undated) DEVICE — PREP TRAY, VAGINAL

## (undated) DEVICE — PAD, SANITARY, OBSTETRICAL, W/ADHSV STRIP,11 IN,LF

## (undated) DEVICE — TOWEL, SURGICAL, NEURO, O/R, 16 X 26, BLUE, STERILE

## (undated) DEVICE — COVER HANDLE LIGHT, STERIS, BLUE, STERILE

## (undated) DEVICE — Device

## (undated) DEVICE — ACCESSORY, FLUID MANAGEMENT, AVETA

## (undated) DEVICE — SOLUTION, IRRIGATION, USP, SODIUM CHLORIDE 0.9%, 3000 ML

## (undated) DEVICE — SOLUTION, SCRUB EXIDINE, 4% CHG, 4 OZ

## (undated) DEVICE — ACCESSORY, AVETA, WASTE MANAGEMENT WITH CAP

## (undated) DEVICE — BRIEF, CURITY, XLARGE, MESH

## (undated) DEVICE — HYSTEROSCOPE, AVETA CORAL, 4.6MM